# Patient Record
Sex: MALE | Race: WHITE | NOT HISPANIC OR LATINO | Employment: OTHER | URBAN - METROPOLITAN AREA
[De-identification: names, ages, dates, MRNs, and addresses within clinical notes are randomized per-mention and may not be internally consistent; named-entity substitution may affect disease eponyms.]

---

## 2024-02-22 ENCOUNTER — TELEPHONE (OUTPATIENT)
Dept: FAMILY MEDICINE | Facility: CLINIC | Age: 89
End: 2024-02-22
Payer: OTHER GOVERNMENT

## 2024-02-22 NOTE — TELEPHONE ENCOUNTER
----- Message from Marta Ferris sent at 2/22/2024  3:48 PM CST -----  Contact: Hugh ( pt son )  Hugh ( pt son ) is requesting a call from nurse to discuss sending pt records over electrically .  Please call Hugh ( pt son )  back at [phone]

## 2024-03-22 ENCOUNTER — OFFICE VISIT (OUTPATIENT)
Dept: FAMILY MEDICINE | Facility: CLINIC | Age: 89
End: 2024-03-22
Payer: MEDICARE

## 2024-03-22 ENCOUNTER — LAB VISIT (OUTPATIENT)
Dept: LAB | Facility: HOSPITAL | Age: 89
End: 2024-03-22
Attending: FAMILY MEDICINE
Payer: MEDICARE

## 2024-03-22 VITALS — HEART RATE: 60 BPM | HEIGHT: 69 IN | OXYGEN SATURATION: 98 % | WEIGHT: 204 LBS | BODY MASS INDEX: 30.21 KG/M2

## 2024-03-22 DIAGNOSIS — Z79.4 TYPE 2 DIABETES MELLITUS WITH STAGE 3 CHRONIC KIDNEY DISEASE, WITH LONG-TERM CURRENT USE OF INSULIN, UNSPECIFIED WHETHER STAGE 3A OR 3B CKD: Primary | Chronic | ICD-10-CM

## 2024-03-22 DIAGNOSIS — I48.91 ATRIAL FIBRILLATION, UNSPECIFIED TYPE: Chronic | ICD-10-CM

## 2024-03-22 DIAGNOSIS — Z79.899 ENCOUNTER FOR LONG-TERM (CURRENT) USE OF MEDICATIONS: ICD-10-CM

## 2024-03-22 DIAGNOSIS — Z13.220 ENCOUNTER FOR LIPID SCREENING FOR CARDIOVASCULAR DISEASE: ICD-10-CM

## 2024-03-22 DIAGNOSIS — N18.30 TYPE 2 DIABETES MELLITUS WITH STAGE 3 CHRONIC KIDNEY DISEASE, WITH LONG-TERM CURRENT USE OF INSULIN, UNSPECIFIED WHETHER STAGE 3A OR 3B CKD: Primary | Chronic | ICD-10-CM

## 2024-03-22 DIAGNOSIS — Z00.00 ENCOUNTER FOR MEDICAL EXAMINATION TO ESTABLISH CARE: ICD-10-CM

## 2024-03-22 DIAGNOSIS — G62.9 NEUROPATHY: ICD-10-CM

## 2024-03-22 DIAGNOSIS — Z23 NEED FOR VACCINATION: ICD-10-CM

## 2024-03-22 DIAGNOSIS — Z12.5 ENCOUNTER FOR PROSTATE CANCER SCREENING: ICD-10-CM

## 2024-03-22 DIAGNOSIS — J44.9 CHRONIC OBSTRUCTIVE PULMONARY DISEASE, UNSPECIFIED COPD TYPE: Chronic | ICD-10-CM

## 2024-03-22 DIAGNOSIS — G47.34 OXYGEN DESATURATION DURING SLEEP: ICD-10-CM

## 2024-03-22 DIAGNOSIS — N40.1 BENIGN PROSTATIC HYPERPLASIA WITH LOWER URINARY TRACT SYMPTOMS, SYMPTOM DETAILS UNSPECIFIED: ICD-10-CM

## 2024-03-22 DIAGNOSIS — E11.22 TYPE 2 DIABETES MELLITUS WITH STAGE 3 CHRONIC KIDNEY DISEASE, WITH LONG-TERM CURRENT USE OF INSULIN, UNSPECIFIED WHETHER STAGE 3A OR 3B CKD: Primary | Chronic | ICD-10-CM

## 2024-03-22 DIAGNOSIS — Z13.6 ENCOUNTER FOR LIPID SCREENING FOR CARDIOVASCULAR DISEASE: ICD-10-CM

## 2024-03-22 PROBLEM — M54.41 CHRONIC BILATERAL LOW BACK PAIN WITH BILATERAL SCIATICA: Status: ACTIVE | Noted: 2017-05-03

## 2024-03-22 PROBLEM — N28.1 BILATERAL RENAL CYSTS: Status: ACTIVE | Noted: 2020-08-26

## 2024-03-22 PROBLEM — S22.49XA RIB FRACTURES: Status: ACTIVE | Noted: 2022-04-25

## 2024-03-22 PROBLEM — E83.42 HYPOMAGNESEMIA: Status: ACTIVE | Noted: 2017-11-14

## 2024-03-22 PROBLEM — I50.30 HEART FAILURE WITH PRESERVED EJECTION FRACTION: Status: ACTIVE | Noted: 2017-09-13

## 2024-03-22 PROBLEM — F33.41 RECURRENT MAJOR DEPRESSIVE DISORDER, IN PARTIAL REMISSION: Chronic | Status: ACTIVE | Noted: 2017-07-13

## 2024-03-22 PROBLEM — E03.9 HYPOTHYROID: Status: ACTIVE | Noted: 2017-05-03

## 2024-03-22 PROBLEM — N18.32 STAGE 3B CHRONIC KIDNEY DISEASE: Chronic | Status: ACTIVE | Noted: 2020-11-11

## 2024-03-22 PROBLEM — D50.9 IRON DEFICIENCY ANEMIA: Status: ACTIVE | Noted: 2020-08-21

## 2024-03-22 PROBLEM — J40 BRONCHITIS: Status: ACTIVE | Noted: 2017-05-03

## 2024-03-22 PROBLEM — R29.898 MUSCULAR DECONDITIONING: Status: ACTIVE | Noted: 2017-06-03

## 2024-03-22 PROBLEM — G25.81 RESTLESS LEG SYNDROME: Status: ACTIVE | Noted: 2020-08-25

## 2024-03-22 PROBLEM — G47.33 OBSTRUCTIVE SLEEP APNEA SYNDROME: Status: ACTIVE | Noted: 2017-08-10

## 2024-03-22 PROBLEM — R00.1 SINUS BRADYCARDIA: Status: ACTIVE | Noted: 2017-09-28

## 2024-03-22 PROBLEM — M54.2 NECK PAIN: Status: ACTIVE | Noted: 2020-03-01

## 2024-03-22 PROBLEM — I10 ESSENTIAL HYPERTENSION: Status: ACTIVE | Noted: 2017-05-03

## 2024-03-22 PROBLEM — Z79.01 LONG TERM CURRENT USE OF ANTICOAGULANT: Status: ACTIVE | Noted: 2024-03-22

## 2024-03-22 PROBLEM — N25.81 HYPERPARATHYROIDISM, SECONDARY RENAL: Status: ACTIVE | Noted: 2023-07-18

## 2024-03-22 PROBLEM — M15.9 GENERALIZED OSTEOARTHRITIS OF MULTIPLE SITES: Status: ACTIVE | Noted: 2017-05-31

## 2024-03-22 PROBLEM — I70.0 ATHEROSCLEROSIS OF AORTA: Chronic | Status: ACTIVE | Noted: 2022-07-21

## 2024-03-22 PROBLEM — B35.4 TINEA CORPORIS: Status: ACTIVE | Noted: 2018-02-12

## 2024-03-22 PROBLEM — K59.01 SLOW TRANSIT CONSTIPATION: Status: ACTIVE | Noted: 2018-06-01

## 2024-03-22 PROBLEM — M54.42 CHRONIC BILATERAL LOW BACK PAIN WITH BILATERAL SCIATICA: Status: ACTIVE | Noted: 2017-05-03

## 2024-03-22 PROBLEM — I87.8 VENOUS STASIS: Status: ACTIVE | Noted: 2017-07-13

## 2024-03-22 PROBLEM — G89.29 CHRONIC BILATERAL LOW BACK PAIN WITH BILATERAL SCIATICA: Status: ACTIVE | Noted: 2017-05-03

## 2024-03-22 PROBLEM — I87.2 VENOUS STASIS DERMATITIS OF BOTH LOWER EXTREMITIES: Status: ACTIVE | Noted: 2018-06-01

## 2024-03-22 PROBLEM — E11.42 DIABETIC POLYNEUROPATHY ASSOCIATED WITH TYPE 2 DIABETES MELLITUS: Chronic | Status: ACTIVE | Noted: 2017-05-03

## 2024-03-22 PROBLEM — R06.00 DYSPNEA: Status: ACTIVE | Noted: 2020-02-20

## 2024-03-22 PROBLEM — K59.81 OGILVIE SYNDROME: Status: ACTIVE | Noted: 2022-04-25

## 2024-03-22 PROBLEM — R60.0 BILATERAL LOWER EXTREMITY EDEMA: Status: ACTIVE | Noted: 2017-07-13

## 2024-03-22 PROBLEM — F40.240 CLAUSTROPHOBIA: Status: ACTIVE | Noted: 2017-08-11

## 2024-03-22 PROBLEM — R26.89 BALANCE PROBLEM: Status: ACTIVE | Noted: 2017-05-31

## 2024-03-22 PROBLEM — I25.10 ARTERIOSCLEROSIS OF CORONARY ARTERY: Status: ACTIVE | Noted: 2024-03-22

## 2024-03-22 PROBLEM — N18.4 STAGE 4 CHRONIC KIDNEY DISEASE: Status: ACTIVE | Noted: 2024-03-22

## 2024-03-22 PROBLEM — D64.9 ANEMIA: Status: ACTIVE | Noted: 2018-06-01

## 2024-03-22 PROBLEM — H54.7 VISION IMPAIRMENT: Status: ACTIVE | Noted: 2017-09-07

## 2024-03-22 PROBLEM — E78.5 DYSLIPIDEMIA: Status: ACTIVE | Noted: 2017-09-13

## 2024-03-22 PROBLEM — R91.8 LUNG MASS: Status: ACTIVE | Noted: 2024-01-25

## 2024-03-22 PROBLEM — E13.3399: Chronic | Status: ACTIVE | Noted: 2017-05-03

## 2024-03-22 PROBLEM — N18.4 STAGE 4 CHRONIC KIDNEY DISEASE: Status: RESOLVED | Noted: 2024-03-22 | Resolved: 2024-03-22

## 2024-03-22 LAB
ALBUMIN SERPL BCP-MCNC: 2.9 G/DL (ref 3.5–5.2)
ALP SERPL-CCNC: 81 U/L (ref 55–135)
ALT SERPL W/O P-5'-P-CCNC: 21 U/L (ref 10–44)
ANION GAP SERPL CALC-SCNC: 10 MMOL/L (ref 8–16)
AST SERPL-CCNC: 22 U/L (ref 10–40)
BILIRUB SERPL-MCNC: 0.4 MG/DL (ref 0.1–1)
BUN SERPL-MCNC: 35 MG/DL (ref 8–23)
CALCIUM SERPL-MCNC: 8.2 MG/DL (ref 8.7–10.5)
CHLORIDE SERPL-SCNC: 109 MMOL/L (ref 95–110)
CO2 SERPL-SCNC: 23 MMOL/L (ref 23–29)
CREAT SERPL-MCNC: 1.9 MG/DL (ref 0.5–1.4)
EST. GFR  (NO RACE VARIABLE): 33.1 ML/MIN/1.73 M^2
GLUCOSE SERPL-MCNC: 122 MG/DL (ref 70–110)
POTASSIUM SERPL-SCNC: 4 MMOL/L (ref 3.5–5.1)
PROT SERPL-MCNC: 5.4 G/DL (ref 6–8.4)
SODIUM SERPL-SCNC: 142 MMOL/L (ref 136–145)
T4 FREE SERPL-MCNC: 0.92 NG/DL (ref 0.71–1.51)
TSH SERPL DL<=0.005 MIU/L-ACNC: 9.99 UIU/ML (ref 0.4–4)

## 2024-03-22 PROCEDURE — 80053 COMPREHEN METABOLIC PANEL: CPT | Performed by: FAMILY MEDICINE

## 2024-03-22 PROCEDURE — 90694 VACC AIIV4 NO PRSRV 0.5ML IM: CPT | Mod: PBBFAC,PO

## 2024-03-22 PROCEDURE — 36415 COLL VENOUS BLD VENIPUNCTURE: CPT | Mod: PO | Performed by: FAMILY MEDICINE

## 2024-03-22 PROCEDURE — 84443 ASSAY THYROID STIM HORMONE: CPT | Performed by: FAMILY MEDICINE

## 2024-03-22 PROCEDURE — G0008 ADMIN INFLUENZA VIRUS VAC: HCPCS | Mod: PBBFAC,PO

## 2024-03-22 PROCEDURE — 99999 PR PBB SHADOW E&M-EST. PATIENT-LVL V: CPT | Mod: PBBFAC,,, | Performed by: FAMILY MEDICINE

## 2024-03-22 PROCEDURE — 99205 OFFICE O/P NEW HI 60 MIN: CPT | Mod: S$PBB,,, | Performed by: FAMILY MEDICINE

## 2024-03-22 PROCEDURE — 99999PBSHW FLU VACCINE - QUADRIVALENT - ADJUVANTED: Mod: PBBFAC,,,

## 2024-03-22 PROCEDURE — 84439 ASSAY OF FREE THYROXINE: CPT | Performed by: FAMILY MEDICINE

## 2024-03-22 PROCEDURE — 99215 OFFICE O/P EST HI 40 MIN: CPT | Mod: PBBFAC,PO,25 | Performed by: FAMILY MEDICINE

## 2024-03-22 RX ORDER — DOXAZOSIN 4 MG/1
4 TABLET ORAL NIGHTLY
COMMUNITY
End: 2024-03-22

## 2024-03-22 RX ORDER — CITALOPRAM 20 MG/1
20 TABLET, FILM COATED ORAL
COMMUNITY
Start: 2024-01-30

## 2024-03-22 RX ORDER — CARVEDILOL 3.12 MG/1
3.12 TABLET ORAL
COMMUNITY
Start: 2023-10-10

## 2024-03-22 RX ORDER — ALBUTEROL SULFATE 90 UG/1
2 AEROSOL, METERED RESPIRATORY (INHALATION) EVERY 4 HOURS PRN
COMMUNITY

## 2024-03-22 RX ORDER — ATORVASTATIN CALCIUM 20 MG/1
20 TABLET, FILM COATED ORAL
COMMUNITY
Start: 2024-02-26

## 2024-03-22 RX ORDER — BENZONATATE 100 MG/1
100 CAPSULE ORAL 3 TIMES DAILY PRN
COMMUNITY
Start: 2023-12-14 | End: 2024-04-26

## 2024-03-22 RX ORDER — GABAPENTIN 100 MG/1
100 CAPSULE ORAL
COMMUNITY
Start: 2024-01-29 | End: 2024-03-22

## 2024-03-22 RX ORDER — BUMETANIDE 1 MG/1
TABLET ORAL
COMMUNITY
Start: 2024-03-01

## 2024-03-22 RX ORDER — CHOLECALCIFEROL (VITAMIN D3) 25 MCG
25 TABLET ORAL
COMMUNITY
Start: 2023-04-27

## 2024-03-22 RX ORDER — LEVOTHYROXINE SODIUM 200 UG/1
1 TABLET ORAL DAILY
COMMUNITY
Start: 1970-01-01

## 2024-03-22 RX ORDER — TRAMADOL HYDROCHLORIDE 50 MG/1
50 TABLET ORAL EVERY 6 HOURS PRN
COMMUNITY
End: 2024-04-26 | Stop reason: SDUPTHER

## 2024-03-22 RX ORDER — DUTASTERIDE 0.5 MG/1
1 CAPSULE, LIQUID FILLED ORAL DAILY
COMMUNITY
Start: 2024-01-29

## 2024-03-22 RX ORDER — ROPINIROLE 0.25 MG/1
0.25 TABLET, FILM COATED ORAL
COMMUNITY
Start: 2023-04-27

## 2024-03-22 RX ORDER — INSULIN GLARGINE-YFGN 100 [IU]/ML
INJECTION, SOLUTION SUBCUTANEOUS
COMMUNITY
Start: 2023-04-26 | End: 2024-04-26

## 2024-03-22 RX ORDER — DOXAZOSIN 8 MG/1
8 TABLET ORAL DAILY
COMMUNITY

## 2024-03-22 RX ORDER — PREGABALIN 75 MG/1
75 CAPSULE ORAL 2 TIMES DAILY
Qty: 60 CAPSULE | Refills: 12 | Status: SHIPPED | OUTPATIENT
Start: 2024-03-22 | End: 2025-04-16

## 2024-03-22 NOTE — PROGRESS NOTES
PLAN:    Assessment & Plan  1. Diabetes.  His diabetes is well controlled. His A1c is 6.2. I will start him on Jardiance 1 mg in the morning. He was advised to not stop the Lantus until he gets on the Jardiance. If his sugars start dropping below 100, I would back it off or switch it to a different time a day or split it up and do 5 in the morning and 5 at night.  We will plan to monitor hemoglobin A1c at designated intervals 3 to 6 months.  I recommend ongoing Education for diabetic diet and exercise protocol.  We will continue to monitor for side effects.    Please be advised of symptoms to monitor for and to notify me immediately if persistent or worsening.  Follow up with Ophthalmology/Optometry and Podiatry at least annually.      2. Hypothyroidism.  His last TSH that I can see through Care Everywhere in Epic shows elevation of TSH. I will recheck labs on this.  Please be advised of hypothyroidism disease course.  We will plan to monitor thyroid labs at routine intervals.  Please be advised of risks and benefits of medication use, see medication insert for complete details.  ER precautions.    Common complaints from hypothyroidism include weight gain, fatigue, cold intolerance, constipation, dry and flaky shin, coarse or loss of hair, and trouble with memory.     Complaints with hyperthyroidism are weight loss or decrease in appetite, weakness and fatigue, visual changes, fast heart rate, decreased heat tolerance, thinning scalp hair, change in bowel habits, and palpations.    3. Atrial fibrillation.  He will be referred to a cardiologist.    Atrial fibrillation precautions.  Continue blood thinner and beta-blocker.    4. Neuropathy.  He has neuropathy in his feet, legs, and hands. We will discontinue gabapentin.  Start Lyrica.  Discussed risk and benefits of medication use.    5. Hypothyroidism.  He is on levothyroxine 200 mcg daily. He reports compliance. No side effects reported. His last TSH that I can see  through Care Everywhere in Epic shows elevation of TSH. I will recheck labs on this. (  Duplicate)    6. Heart failure.  He is on Bumex 1 to 2 as needed. He was advised to wear compression stockings. He was advised to do daily weights. I will refer him to a   Cardiologist. If there is fluctuation in his weight, we will increase his Bumex.   Heart failure precautions.    7. BPH.  He is on dutasteride 0.5 mg. I will refer him to Dr. Dejan Mcintosh.   Continue doxazosin.    8. Restless leg syndrome.  He is on ropinirole 0.25 mg daily. We will discontinue gabapentin.    9. Health maintenance.  He will receive his influenza vaccine today. He was advised to get RSV and shingles vaccines.    10. Lower extremity edema.  He has lower extremity edema.  Start compression stockings.     11. COPD referral to Pulmonary.  Patient is concerned about CF.  He does have congestion.    Follow-up  He will get his blood work done in 4 weeks.    Problem List Items Addressed This Visit       Atrial fibrillation (Chronic)    Relevant Orders    Ambulatory referral/consult to Cardiology    COPD (chronic obstructive pulmonary disease) (Chronic)    Relevant Orders    Ambulatory referral/consult to Pulmonology    Type 2 diabetes mellitus with stage 3 chronic kidney disease, with long-term current use of insulin - Primary (Chronic)    Relevant Medications    insulin glargine-yfgn 100 unit/mL (3 mL) InPn    semaglutide (OZEMPIC) 1 mg/dose (4 mg/3 mL)    empagliflozin (JARDIANCE) 10 mg tablet    Other Relevant Orders    Ambulatory referral/consult to Nephrology    Benign prostatic hyperplasia with lower urinary tract symptoms    Relevant Orders    Ambulatory referral/consult to Urology    Neuropathy    Relevant Medications    pregabalin (LYRICA) 75 MG capsule    Encounter for long-term (current) use of medications    Relevant Orders    Lipid Panel    Hemoglobin A1C    CBC Without Differential    Comprehensive Metabolic Panel    TSH     Other Visit  Diagnoses       Encounter for medical examination to establish care        Relevant Orders    Lipid Panel    Hemoglobin A1C    CBC Without Differential    Comprehensive Metabolic Panel    TSH    Encounter for lipid screening for cardiovascular disease        Relevant Orders    Lipid Panel    Encounter for prostate cancer screening            The natural history of prostate cancer and ongoing controversy regarding screening and potential treatment outcomes of prostate cancer has been discussed with the patient. The meaning of a false positive PSA and a false negative PSA has been discussed. He indicates understanding of the limitations of this screening test and wishes  not to proceed with screening PSA testing.    Future Appointments       Date Provider Specialty Appt Notes    3/22/2024  Lab     4/18/2024 Dayna Olvera PA-C Pulmonology COPD    5/2/2024 Bret Hill MD Cardiology a fib    6/5/2024 Ramy Troncoso MD Nephrology type 2 diabetes/CKD3           Medication Management for assessment above:   Medication List with Changes/Refills   New Medications    EMPAGLIFLOZIN (JARDIANCE) 10 MG TABLET    Take 1 tablet (10 mg total) by mouth once daily.    PREGABALIN (LYRICA) 75 MG CAPSULE    Take 1 capsule (75 mg total) by mouth 2 (two) times daily.   Current Medications    ALBUTEROL (PROVENTIL/VENTOLIN HFA) 90 MCG/ACTUATION INHALER    Inhale 2 puffs into the lungs every 4 (four) hours as needed.    APIXABAN (ELIQUIS) 2.5 MG TAB    2.5 mg.    ATORVASTATIN (LIPITOR) 20 MG TABLET    20 mg.    BENZONATATE (TESSALON) 100 MG CAPSULE    Take 100 mg by mouth 3 (three) times daily as needed.    BUMETANIDE (BUMEX) 1 MG TABLET    Take two tablet early in the morning and one right after lunch.    CARVEDILOL (COREG) 3.125 MG TABLET    3.125 mg.    CITALOPRAM (CELEXA) 20 MG TABLET    20 mg.    DOXAZOSIN (CARDURA) 8 MG TAB    Take 8 mg by mouth once daily.    DUTASTERIDE (AVODART) 0.5 MG CAPSULE    Take 1 capsule  by mouth once daily.    INSULIN GLARGINE-YFGN 100 UNIT/ML (3 ML) INPN    ADMINISTER 8 UNITS UNDER THE SKIN PER COMMENTS:  FOR DIABETES  AT BEDTIME, AFTER 3 DAYS IF MORNING FASTING BLOOD SUGAR ABOVE 120,  INCREASE YOUR INSULIN BY 3 UNITS EVERY 3 DAYS TO GET  THE MORNING BLOOD  SUGAR UNDER CONTROL FOR DIABETES  AT BEDTIME, AFTER 3 DAYS IF MORNING FASTING BLOOD SUGAR ABOVE 120,  INCREASE YOUR INSULIN BY 3 UNITS EVERY 3 DAYS TO GET  THE MORNING BLOOD  SUGAR UNDER CONTROL    LEVOTHYROXINE (SYNTHROID) 200 MCG TABLET    Take 1 tablet by mouth once daily.    ROPINIROLE (REQUIP) 0.25 MG TABLET    0.25 mg.    SEMAGLUTIDE (OZEMPIC) 1 MG/DOSE (4 MG/3 ML)    ADMINISTER 1 MG UNDER THE SKIN EVERY WEEK FOR DIABETES    TRAMADOL (ULTRAM) 50 MG TABLET    Take 50 mg by mouth every 6 (six) hours as needed for Pain.    VITAMIN D (VITAMIN D3) 1000 UNITS TAB    25 mcg.   Discontinued Medications    DOXAZOSIN (CARDURA) 4 MG TABLET    Take 4 mg by mouth every evening.    GABAPENTIN (NEURONTIN) 100 MG CAPSULE    Take 100 mg by mouth.       Bao Mcarthur M.D.  ==========================================================================  Subjective:   Patient ID: Zach Lund is a 89 y.o. male.  has no past medical history on file.   Chief Complaint: Establish Care, Hyperlipidemia, Hypertension, Diabetes, and Atrial Fibrillation      History of Present Illness  This is a pleasant 89-year-old male coming in to establish care. This patient is coming to establish care. He just had labs done in [place] from [facility] where he was at. I can see his records in there. He has a history of A. fib, diabetes, hypertension, and hyperlipidemia. He was established with all the specialists over there, but he needs to transfer everything over here. [facility] yet. He lives at [facility]. He is in assisted living. This patient is a diabetic. His last A1c was in 01/2025. A1c is 6.2,  which has been stable on Ozempic 1 mg weekly. The patient also has a history of atrial fibrillation. He is on Eliquis 2.5 mg and carvedilol 3.125 mg. The patient has hypothyroidism, on levothyroxine 200 mcg daily. He reports compliance. No side effects reported. Symptoms are mostly controlled. His last TSH that I can see through Care Everywhere in Epic shows elevation of TSH. I will recheck labs on this. His cholesterol is well controlled with LDL of 94, on atorvastatin 20 mg daily. He also has a history of heart failure, on Bumex 1 mg. He has some BPH, on dutasteride 0.5 mg, which is stable. Restless leg, on ropinirole 0.25 mg daily. He used to take gabapentin 100 mg but had side effect. Taking albuterol and Tessalon Perles for cough and COPD. He has Celexa 20 mg daily on his medication list. We will find out what he is taking this for and provide refills. He is accompanied by an adult Male.    He has no problems today, just wants to get established. He is taking Ozempic and Lantus between 10 and 12 units. His primary care in [place] had mentioned about starting him on Jardiance in addition to ozempic because of the heart failure diagnosis. He has been gaining weight. He has had a couple of low episodes of glucose into the 60s occasionally. As soon as he ate, his blood sugar would be up in the 300s. Last year, he was 271 pounds, got down to 185, and now he is up to 204. He is not eating much.  He likes to eat catfish.    [facility]. He was down to 175 on levothyroxine and then went up on it.    He had a UroLift. He has not seen urology in a year. He is taking doxazosin 8 mg 1 a day for enlarged prostate. He does have some urine incontinence. He saw a urologist in [place].    He has neuropathy in his feet, legs, and hands. He used to take gabapentin, but he has not taken it for 6 years because it made him sick and more painful. He has not tried Lyrica. He is getting new symptoms in his hands  "and fingers like somebody sticks him with an ice pick. It started last month.    He has been doing EKGs on his Apple watch. It showed atrial fibrillation in the last week or so. His heart rate has been running 50 to 52. He does not wear compression stockings. He has a lot of phlegm. He takes Spiriva, albuterol, and Tessalon Perles. He is on 3 to 5 liters of oxygen at home because he is short of breath most of the time.    Supplemental Information  He takes tramadol as needed.   He last received his influenza vaccine in 2022.    Diabetes Management Status    Statin: Taking  ACE/ARB: Not taking    Screening or Prevention Patient's value Goal Complete/Controlled?   HgA1C Testing and Control   No results found for: "HGBA1C"   Annually/Less than 8% No   Lipid profile : 01/15/2024 Annually No   LDL control No results found for: "LDLCALC" Annually/Less than 100 mg/dl  No   Nephropathy screening No results found for: "LABMICR"  No results found for: "PROTEINUA"  No results found for: "UTPCR"   Annually No   Blood pressure BP Readings from Last 1 Encounters:   No data found for BP    Less than 140/90 Yes   Dilated retinal exam Most Recent Eye Exam Date: Not Found Annually No   Foot exam   Most Recent Foot Exam Date: Not Found Annually No     Problem List Items Addressed This Visit       Atrial fibrillation (Chronic)    Overview     Formatting of this note might be different from the original.   6/26/2020 EKG: Atrial fibrillation         COPD (chronic obstructive pulmonary disease) (Chronic)    Overview     Formatting of this note might be different from the original.   Former smoker, 10.00 pack-year      Last Assessment & Plan:    Formatting of this note might be different from the original.   COPD is unchanged.   COPD information handout given.      Continue albuterol plan.         Type 2 diabetes mellitus with stage 3 chronic kidney disease, with long-term current use of insulin - Primary (Chronic)    Overview     " Formatting of this note is different from the original.   Lab Results    Component Value Date/Time     HGBA1C 6.2 (H) 01/26/2023 08:55 AM     GFR 39 (L) 12/09/2022 08:40 AM     GFRCNAFA 34 (L) 02/17/2022 01:44 PM     MICROALBCREA 22.4 10/31/2022 12:11 PM     LDLCALC 43 02/17/2022 01:44 PM       Last Eye Exam: 2/2/2023    Last Foot Exam: 5/18/2022       Last Assessment & Plan:    Formatting of this note might be different from the original.   Diabetes is unchanged.    Continue current treatment regimen.   Diabetes will be reassessed in 6 months.      A1c 6.2%. Discussed diet control - Lantus 10 units.         Benign prostatic hyperplasia with lower urinary tract symptoms    Neuropathy    Overview     Last Assessment & Plan:    Formatting of this note might be different from the original.   Continue home dose gabapentin         Encounter for long-term (current) use of medications     Other Visit Diagnoses       Encounter for medical examination to establish care        Encounter for lipid screening for cardiovascular disease        Encounter for prostate cancer screening                 Review of patient's allergies indicates:   Allergen Reactions    Iodinated contrast media Anaphylaxis    Iodine Anaphylaxis     Breathing issue     Current Outpatient Medications   Medication Instructions    albuterol (PROVENTIL/VENTOLIN HFA) 90 mcg/actuation inhaler 2 puffs, Inhalation, Every 4 hours PRN    apixaban (ELIQUIS) 2.5 mg    atorvastatin (LIPITOR) 20 mg    benzonatate (TESSALON) 100 mg, Oral, 3 times daily PRN    bumetanide (BUMEX) 1 MG tablet Take two tablet early in the morning and one right after lunch.    carvediloL (COREG) 3.125 mg    citalopram (CELEXA) 20 mg    doxazosin (CARDURA) 8 mg, Oral, Daily    dutasteride (AVODART) 0.5 mg capsule 1 capsule, Oral, Daily    empagliflozin (JARDIANCE) 10 mg, Oral, Daily    insulin glargine-yfgn 100 unit/mL (3 mL) InPn ADMINISTER 8 UNITS UNDER THE SKIN PER COMMENTS:  FOR DIABETES   "AT BEDTIME, AFTER 3 DAYS IF MORNING FASTING BLOOD SUGAR ABOVE 120,  INCREASE YOUR INSULIN BY 3 UNITS EVERY 3 DAYS TO GET  THE MORNING BLOOD  SUGAR UNDER CONTROL FOR DIABETES  AT BEDTIME, AFTER 3 DAYS IF MORNING FASTING BLOOD SUGAR ABOVE 120,  INCREASE YOUR INSULIN BY 3 UNITS EVERY 3 DAYS TO GET  THE MORNING BLOOD  SUGAR UNDER CONTROL    levothyroxine (SYNTHROID) 200 MCG tablet 1 tablet, Oral, Daily    pregabalin (LYRICA) 75 mg, Oral, 2 times daily    rOPINIRole (REQUIP) 0.25 mg    semaglutide (OZEMPIC) 1 mg/dose (4 mg/3 mL) ADMINISTER 1 MG UNDER THE SKIN EVERY WEEK FOR DIABETES    traMADoL (ULTRAM) 50 mg, Oral, Every 6 hours PRN    vitamin D (VITAMIN D3) 25 mcg      I have reviewed the PMH, social history, FamilyHx, surgical history, allergies and medications documented / confirmed by the patient at the time of this visit.  Review of Systems   Constitutional:  Negative for chills, fatigue, fever and unexpected weight change.   HENT:  Negative for ear pain and sore throat.    Eyes:  Negative for redness and visual disturbance.   Respiratory:  Negative for cough and shortness of breath.    Cardiovascular:  Positive for leg swelling. Negative for chest pain and palpitations.   Gastrointestinal:  Negative for nausea and vomiting.   Endocrine: Negative for cold intolerance and heat intolerance.   Genitourinary:  Negative for difficulty urinating and hematuria.   Musculoskeletal:  Negative for arthralgias and myalgias.   Skin:  Negative for rash and wound.   Allergic/Immunologic: Negative for environmental allergies and food allergies.   Neurological:  Negative for weakness and headaches.   Hematological:  Negative for adenopathy. Does not bruise/bleed easily.   Psychiatric/Behavioral:  Negative for sleep disturbance. The patient is not nervous/anxious.      Objective:   Pulse 60   Ht 5' 9" (1.753 m)   Wt 92.5 kg (204 lb)   SpO2 98%   BMI 30.13 kg/m²   Physical Exam  Vitals and nursing note reviewed.   Constitutional: "       General: He is not in acute distress.     Appearance: He is well-developed. He is not diaphoretic.      Comments:   Present with son, sitting in wheelchair   HENT:      Head: Normocephalic and atraumatic.      Right Ear: External ear normal.      Left Ear: External ear normal.      Nose: Nose normal. No rhinorrhea.   Eyes:      Extraocular Movements: Extraocular movements intact.      Pupils: Pupils are equal, round, and reactive to light.   Cardiovascular:      Rate and Rhythm: Rhythm irregular.      Pulses: Normal pulses.      Heart sounds: Murmur heard.   Pulmonary:      Effort: Pulmonary effort is normal. No respiratory distress.      Breath sounds: Wheezing present.      Comments:  Distant breath sounds bilaterally.  Abdominal:      General: Bowel sounds are normal.      Palpations: Abdomen is soft.   Musculoskeletal:         General: Normal range of motion.      Cervical back: Normal range of motion and neck supple.      Right lower leg: Edema present.      Left lower leg: Edema present.   Skin:     General: Skin is warm and dry.      Capillary Refill: Capillary refill takes less than 2 seconds.      Findings: No rash.   Neurological:      General: No focal deficit present.      Mental Status: He is alert and oriented to person, place, and time. Mental status is at baseline.      Cranial Nerves: No cranial nerve deficit.      Motor: No weakness.      Gait: Gait normal.   Psychiatric:         Attention and Perception: He is attentive.         Mood and Affect: Mood normal. Mood is not anxious or depressed. Affect is not labile, blunt, angry or inappropriate.         Speech: He is communicative. Speech is not rapid and pressured, delayed, slurred or tangential.         Behavior: Behavior normal. Behavior is not agitated, slowed, aggressive, withdrawn, hyperactive or combative.         Thought Content: Thought content normal. Thought content is not paranoid or delusional. Thought content does not include  homicidal or suicidal ideation. Thought content does not include homicidal or suicidal plan.         Cognition and Memory: Memory is not impaired.         Judgment: Judgment normal. Judgment is not impulsive or inappropriate.       Physical Exam      Results  Laboratory Studies  Labs were reviewed with the patient.    Assessment:     1. Type 2 diabetes mellitus with stage 3 chronic kidney disease, with long-term current use of insulin, unspecified whether stage 3a or 3b CKD    2. Encounter for medical examination to establish care    3. Encounter for long-term (current) use of medications    4. Encounter for lipid screening for cardiovascular disease    5. Encounter for prostate cancer screening    6. Atrial fibrillation, unspecified type    7. Benign prostatic hyperplasia with lower urinary tract symptoms, symptom details unspecified    8. Neuropathy    9. Chronic obstructive pulmonary disease, unspecified COPD type      MDM:     High medical complexity.  New patient.  Total time: 61 minutes.  This includes total time spent on the encounter, which includes face to face time and non-face to face time preparing to see the patient (eg, review of previous medical records, tests), Obtaining and/or reviewing separately obtained history, documenting clinical information in the electronic or other health record, independently interpreting results (not separately reported)/communicating results to the patient/family/caregiver, and/or care coordination (not separately reported).    I have Reviewed and summarized old records.  I have performed thorough medication reconciliation today and discussed risk and benefits of medications.  I have reviewed labs and discussed with patient.  All questions were answered.  I am requesting old records and will review them once they are available.Previous PCP- Dr. Matias  Cardiology- Dr. Vargas  Urology- Dr. Daniels  Nephrology- Dr. Chase  Derm- Dr. Wesley  Eye- Dr. Palfaox in [place]     I have signed  for the following orders AND/OR meds.  Orders Placed This Encounter   Procedures    Lipid Panel     Standing Status:   Future     Standing Expiration Date:   5/21/2025    Hemoglobin A1C     Standing Status:   Future     Standing Expiration Date:   5/21/2025    CBC Without Differential     Standing Status:   Future     Standing Expiration Date:   5/21/2025    Comprehensive Metabolic Panel     Standing Status:   Future     Standing Expiration Date:   5/21/2025    TSH     Standing Status:   Future     Standing Expiration Date:   5/21/2025    Ambulatory referral/consult to Cardiology     Standing Status:   Future     Standing Expiration Date:   4/22/2025     Referral Priority:   Routine     Referral Type:   Consultation     Referral Reason:   Specialty Services Required     Requested Specialty:   Cardiology     Number of Visits Requested:   1    Ambulatory referral/consult to Nephrology     Standing Status:   Future     Standing Expiration Date:   4/22/2025     Referral Priority:   Urgent     Referral Type:   Consultation     Referral Reason:   Specialty Services Required     Referred to Provider:   Ramy Troncoso MD     Requested Specialty:   Nephrology     Number of Visits Requested:   1    Ambulatory referral/consult to Urology     Standing Status:   Future     Standing Expiration Date:   4/22/2025     Referral Priority:   Routine     Referral Type:   Consultation     Referral Reason:   Specialty Services Required     Referred to Provider:   Dejan Mcintosh MD     Requested Specialty:   Urology     Number of Visits Requested:   1    Ambulatory referral/consult to Pulmonology     Standing Status:   Future     Standing Expiration Date:   4/22/2025     Referral Priority:   Routine     Referral Type:   Consultation     Referral Reason:   Specialty Services Required     Requested Specialty:   Pulmonary Disease     Number of Visits Requested:   1     Medications Ordered This Encounter   Medications    empagliflozin  (JARDIANCE) 10 mg tablet     Sig: Take 1 tablet (10 mg total) by mouth once daily.     Dispense:  90 tablet     Refill:  1    pregabalin (LYRICA) 75 MG capsule     Sig: Take 1 capsule (75 mg total) by mouth 2 (two) times daily.     Dispense:  60 capsule     Refill:  12        Follow up in about 6 months (around 9/22/2024), or if symptoms worsen or fail to improve, for Med refills, LAB RESULTS.  Future Appointments       Date Provider Specialty Appt Notes    3/22/2024  Lab     4/18/2024 Dayna Olvera PA-C Pulmonology COPD    5/2/2024 Bret Hill MD Cardiology a fib    6/5/2024 Ramy Troncoso MD Nephrology type 2 diabetes/CKD3          If no improvement in symptoms or symptoms worsen, advised to call/follow-up at clinic or go to ER. Patient voiced understanding and all questions/concerns were addressed.   DISCLAIMER: This note was compiled by using a speech recognition dictation system and therefore please be aware that typographical / speech recognition errors can and do occur.  Please contact me if you see any errors specifically.  Consent was obtained for [employer] recording system prior to the visit.    Bao Mcarthur M.D.       Office: [phone] [id number] [place] [address]  FAX: [phone]

## 2024-03-22 NOTE — PATIENT INSTRUCTIONS
Follow up in about 6 months (around 9/22/2024), or if symptoms worsen or fail to improve, for Med refills, LAB RESULTS.     Dear patient,   As a result of recent federal legislation (The Federal Cures Act), you may receive lab or pathology results from your visit in your [facility] account before your physician is able to contact you. Your physician or their representative will relay the results to you with their recommendations at their soonest availability.     If no improvement in symptoms or symptoms worsen, please be advised to call MD, follow-up at clinic and/or go to ER if becomes severe.    Bao Mcarthur M.D.        We Offer TELEHEALTH & Same Day Appointments!   Book your Telehealth appointment with me through my nurse or   Clinic appointments on [facility]!    [address] [place] [address]    Office: [phone]   FAX: [phone]    Check out my Facebook Page and Follow Me at: [email or web address]    Check out my website at [employer] by clicking on: [email or web address]    To Schedule appointments online, go to [facility]: [email or web address]

## 2024-03-23 NOTE — PROGRESS NOTES
Make follow-up lab appointment per recommendation below.  Check to see if patient has seen the results through my chart.  If not then,  #CALL THE PATIENT# to discuss results/see if they have questions and document verification of contact. Make F/U appt if needed. [phone]    #My interpretation that was sent to them through [employer]:  Franc, I have reviewed your recent blood work.     Your metabolic panel which shows your glucose, kidney function, electrolytes, and liver function is stable.  Follow-up with nephrology is recommended.  Thyroid study is improved from previous.  FT4 is within normal range.  Continue current dosage of levothyroxine.    =========================  Also please address any outstanding health maintenance that may be due: Shingles Vaccine(1 of 2) Never done  RSV Vaccine (Age 60+ and Pregnant patients)(1 - 1-dose 60+ series) Never done

## 2024-03-24 ENCOUNTER — PATIENT MESSAGE (OUTPATIENT)
Dept: FAMILY MEDICINE | Facility: CLINIC | Age: 89
End: 2024-03-24
Payer: OTHER GOVERNMENT

## 2024-03-25 NOTE — TELEPHONE ENCOUNTER
Were able to see all of this, correct? I see xr's, ct's, etc on my end just want to be sure I'm not missing anything before I reply.

## 2024-03-26 NOTE — TELEPHONE ENCOUNTER
"So through [facility] care everywhere we can see all of the "reports" that were done through another facility with [facility] however we can not see their radiology images at this time.  This may have been where some of the confusion came from if he had recent imaging with no comparison on our end.  "

## 2024-04-05 ENCOUNTER — ON-DEMAND VIRTUAL (OUTPATIENT)
Dept: URGENT CARE | Facility: CLINIC | Age: 89
End: 2024-04-05
Payer: MEDICARE

## 2024-04-05 DIAGNOSIS — J30.9 ALLERGIC RHINITIS, UNSPECIFIED SEASONALITY, UNSPECIFIED TRIGGER: Primary | ICD-10-CM

## 2024-04-05 PROCEDURE — 99203 OFFICE O/P NEW LOW 30 MIN: CPT | Mod: 95,,, | Performed by: NURSE PRACTITIONER

## 2024-04-05 RX ORDER — PREDNISONE 10 MG/1
TABLET ORAL
Qty: 6 TABLET | Refills: 0 | Status: SHIPPED | OUTPATIENT
Start: 2024-04-05 | End: 2024-04-26

## 2024-04-05 NOTE — PROGRESS NOTES
Subjective:      Patient ID: Zach Lund is a 89 y.o. male.    Vitals:  vitals were not taken for this visit.     Chief Complaint: Sinus Problem      Visit Type: TELE AUDIOVISUAL    Present with the patient at the time of consultation: TELEMED PRESENT WITH PATIENT: None (son)    Location: [place], [place] at home    History reviewed. No pertinent past medical history.  Past Surgical History:   Procedure Laterality Date    CHOLECYSTECTOMY  2012    EYE SURGERY  2011    cataracts    PROSTATE SURGERY  2017    Urolift    TONSILLECTOMY       Review of patient's allergies indicates:   Allergen Reactions    Iodinated contrast media Anaphylaxis    Iodine Anaphylaxis     Breathing issue     Current Outpatient Medications on File Prior to Visit   Medication Sig Dispense Refill    albuterol (PROVENTIL/VENTOLIN HFA) 90 mcg/actuation inhaler Inhale 2 puffs into the lungs every 4 (four) hours as needed.      apixaban (ELIQUIS) 2.5 mg Tab 2.5 mg.      atorvastatin (LIPITOR) 20 MG tablet 20 mg.      benzonatate (TESSALON) 100 MG capsule Take 100 mg by mouth 3 (three) times daily as needed.      bumetanide (BUMEX) 1 MG tablet Take two tablet early in the morning and one right after lunch.      carvediloL (COREG) 3.125 MG tablet 3.125 mg.      citalopram (CELEXA) 20 MG tablet 20 mg.      doxazosin (CARDURA) 8 MG Tab Take 8 mg by mouth once daily.      dutasteride (AVODART) 0.5 mg capsule Take 1 capsule by mouth once daily.      empagliflozin (JARDIANCE) 10 mg tablet Take 1 tablet (10 mg total) by mouth once daily. 90 tablet 1    insulin glargine-yfgn 100 unit/mL (3 mL) InPn ADMINISTER 8 UNITS UNDER THE SKIN PER COMMENTS:  FOR DIABETES  AT BEDTIME, AFTER 3 DAYS IF MORNING FASTING BLOOD SUGAR ABOVE 120,  INCREASE YOUR INSULIN BY 3 UNITS EVERY 3 DAYS TO GET  THE MORNING BLOOD  SUGAR UNDER CONTROL FOR DIABETES  AT BEDTIME, AFTER 3 DAYS IF MORNING FASTING BLOOD SUGAR ABOVE 120,  INCREASE YOUR INSULIN BY 3 UNITS EVERY 3 DAYS TO GET  THE  MORNING BLOOD  SUGAR UNDER CONTROL      levothyroxine (SYNTHROID) 200 MCG tablet Take 1 tablet by mouth once daily.      pregabalin (LYRICA) 75 MG capsule Take 1 capsule (75 mg total) by mouth 2 (two) times daily. 60 capsule 12    rOPINIRole (REQUIP) 0.25 MG tablet 0.25 mg.      semaglutide (OZEMPIC) 1 mg/dose (4 mg/3 mL) ADMINISTER 1 MG UNDER THE SKIN EVERY WEEK FOR DIABETES      traMADoL (ULTRAM) 50 mg tablet Take 50 mg by mouth every 6 (six) hours as needed for Pain.      vitamin D (VITAMIN D3) 1000 units Tab 25 mcg.       No current facility-administered medications on file prior to visit.     Family History   Problem Relation Age of Onset    Arthritis Mother     Mental illness Father     Cancer Brother        Medications Ordered                [id number] - [place] - [address] [facility] [address]   [address] [place] [address]    Telephone: [phone]   Fax: [phone]   Hours: Not open 24 hours                         E-Prescribed (1 of 1)              predniSONE (DELTASONE) 10 MG tablet    Sig: Take 2 pills once daily for 2 days and then 1 pill daily for 2 days.       Start: 4/5/24     Quantity: 6 tablet Refills: 0                           Ohs Peq Odvv Intake    4/5/2024 10:47 AM CDT - Filed by Patient   What is your current physical address in the event of a medical emergency?    Are you able to take your vital signs? Yes   Systolic Blood Pressure: 165   Diastolic Blood Pressure: 80   Weight:    Height: 70   Pulse: 52   Temperature:    Respiration rate:    Pulse Oxygen: 96   Please attach any relevant images or files          For 3 weeks, has had sore throat, congestion and voice changes. Has been taking nasal decongestants, allergy pills, flonase. Has lots of mucus and phlegm. Son, Hugh, came on to explain progression of symptoms. Denies fever or sinus pain or pressure. Pt states he has postnasal drip and when he tries to get it up, nothing  comes up. Blood sugars are running well.     Sinus Problem  The current episode started 1 to 4 weeks ago. There has been no fever. Associated symptoms include congestion, a hoarse voice and sneezing. Pertinent negatives include no coughing, headaches, sinus pressure or sore throat. Past treatments include nasal decongestants, oral decongestants and saline nose sprays. The treatment provided mild relief.       Constitution: Negative for fever.   HENT:  Positive for congestion, postnasal drip and voice change. Negative for sinus pain, sinus pressure and sore throat.    Respiratory:  Negative for cough.    Allergic/Immunologic: Positive for sneezing.   Neurological:  Negative for headaches.        Objective:   The physical exam was conducted virtually.  Physical Exam   Constitutional: He is oriented to person, place, and time. No distress.   HENT:   Head: Normocephalic.   Nose: Mucosal edema and congestion present. No rhinorrhea or purulent discharge. Right sinus exhibits no maxillary sinus tenderness and no frontal sinus tenderness. Left sinus exhibits no maxillary sinus tenderness and no frontal sinus tenderness.   Mouth/Throat: No posterior oropharyngeal erythema.   Eyes: Conjunctivae are normal. No scleral icterus. Extraocular movement intact   Pulmonary/Chest: Effort normal. No respiratory distress.   Musculoskeletal: Normal range of motion.         General: Normal range of motion.   Neurological: He is alert and oriented to person, place, and time.   Psychiatric: His behavior is normal. Judgment and thought content normal.       Assessment:     1. Allergic rhinitis, unspecified seasonality, unspecified trigger        Plan:       Allergic rhinitis, unspecified seasonality, unspecified trigger    Other orders  -     predniSONE (DELTASONE) 10 MG tablet; Take 2 pills once daily for 2 days and then 1 pill daily for 2 days.  Dispense: 6 tablet; Refill: 0      Rest. Increase fluid intake.    Monitor your blood sugars,  "as steroids can elevate them temporarily.    Runny nose: Ensure you are blowing your nose frequently. It is better to get the nasal discharge out.    Cough: Cover the cough/cough into the elbow and wash hands often to prevent the spread of germs.  A cough may be the last symptom to go away and may persist for up to 4-6 weeks, so do not be alarmed.    Non-Pharmacological Interventions: Consider humidifiers, maintaining hydration, and elevating the head during sleep to alleviate symptoms.    Congestion: OTC nasal saline into each nostril 1-3 times daily. May also use flonase nasal spray. I especially like Xlear nasal spray to help with congestion.    OTC Mucinex twice daily for at least 5 days to help loosen up mucus.    There are a variety of oral OTC decongestants. Beware of decongestants containing phenylephrine. Studies have shown that these do not work to improve your symptoms.    Nasal decongestants (like Afrin) should only be taken for 1-3 days. If you use these longer than this, you are at high right for "rebound congestion" which means you will continue to be congested.    If headache or fever, take OTC tylenol or ibuprofen as needed per the instructions on the label.    Sore throat: OTC Cepacol or Chloraseptic throat lozenges. Warm salt water gargles several times daily.     Sneezing: Antihistamines (Claritin, Allegra, Benadryl, Zyrtec, Xyzal) help with these symptoms. Benadryl, Zyrtec, and Xyzal may cause drowsiness, so avoid operating any heavy machinery or driving while on these medications.    Report for immediate medical care for symptoms including but not limited to: unremitting fever, severe nasal congestion that makes it hard for you to breathe, nostrils are flaring, skin is pale, wheezing, shortness of breath, chest pain, etc.    Follow-up with your PCP for recheck in 1-3 days.    All questions were answered to the best of my abilities and all concerns were addressed at this time.     Follow up: "   1. Please schedule a virtual follow up visit as needed.  2. Please see an in-person provider if your symptoms are worsening or not improving in 2-3 days.  3. Please print a copy of this note and send it to your regular doctor or take it to your next visit so it may be included in your medical record.   4. You must understand that you've received Telehealth Urgent Care treatment only and that you may be released before all your medical problems are known or treated. You, the patient, will arrange for follow up care as instructed.  5. Follow up with your PCP or specialty clinic as directed. To schedule an appointment with the appropriate provider with [facility], please call [phone]. For pediatric referrals, please call [phone]  6. Contact customer support at [phone] for questions or concerns  7. For prescription questions or problems, call [phone] anytime for assistance.  8. For [facility] support, call [phone].

## 2024-04-18 ENCOUNTER — LAB VISIT (OUTPATIENT)
Dept: LAB | Facility: HOSPITAL | Age: 89
End: 2024-04-18
Attending: PHYSICIAN ASSISTANT
Payer: MEDICARE

## 2024-04-18 ENCOUNTER — OFFICE VISIT (OUTPATIENT)
Dept: PULMONOLOGY | Facility: CLINIC | Age: 89
End: 2024-04-18
Payer: MEDICARE

## 2024-04-18 ENCOUNTER — TELEPHONE (OUTPATIENT)
Dept: PULMONOLOGY | Facility: CLINIC | Age: 89
End: 2024-04-18

## 2024-04-18 VITALS
BODY MASS INDEX: 29.49 KG/M2 | WEIGHT: 206 LBS | HEART RATE: 57 BPM | HEIGHT: 70 IN | DIASTOLIC BLOOD PRESSURE: 72 MMHG | OXYGEN SATURATION: 97 % | RESPIRATION RATE: 18 BRPM | SYSTOLIC BLOOD PRESSURE: 118 MMHG

## 2024-04-18 DIAGNOSIS — J44.9 CHRONIC OBSTRUCTIVE PULMONARY DISEASE, UNSPECIFIED COPD TYPE: Primary | Chronic | ICD-10-CM

## 2024-04-18 DIAGNOSIS — J44.9 CHRONIC OBSTRUCTIVE PULMONARY DISEASE, UNSPECIFIED COPD TYPE: Chronic | ICD-10-CM

## 2024-04-18 DIAGNOSIS — G47.34 OXYGEN DESATURATION DURING SLEEP: ICD-10-CM

## 2024-04-18 DIAGNOSIS — R91.8 LUNG MASS: ICD-10-CM

## 2024-04-18 LAB
BASOPHILS # BLD AUTO: 0.03 K/UL (ref 0–0.2)
BASOPHILS NFR BLD: 0.4 % (ref 0–1.9)
DIFFERENTIAL METHOD BLD: ABNORMAL
EOSINOPHIL # BLD AUTO: 0.3 K/UL (ref 0–0.5)
EOSINOPHIL NFR BLD: 3.3 % (ref 0–8)
ERYTHROCYTE [DISTWIDTH] IN BLOOD BY AUTOMATED COUNT: 13.9 % (ref 11.5–14.5)
HCT VFR BLD AUTO: 33.4 % (ref 40–54)
HGB BLD-MCNC: 10.1 G/DL (ref 14–18)
IMM GRANULOCYTES # BLD AUTO: 0.05 K/UL (ref 0–0.04)
IMM GRANULOCYTES NFR BLD AUTO: 0.6 % (ref 0–0.5)
LYMPHOCYTES # BLD AUTO: 1.5 K/UL (ref 1–4.8)
LYMPHOCYTES NFR BLD: 19.5 % (ref 18–48)
MCH RBC QN AUTO: 29.8 PG (ref 27–31)
MCHC RBC AUTO-ENTMCNC: 30.2 G/DL (ref 32–36)
MCV RBC AUTO: 99 FL (ref 82–98)
MONOCYTES # BLD AUTO: 0.9 K/UL (ref 0.3–1)
MONOCYTES NFR BLD: 11.1 % (ref 4–15)
NEUTROPHILS # BLD AUTO: 5.1 K/UL (ref 1.8–7.7)
NEUTROPHILS NFR BLD: 65.1 % (ref 38–73)
NRBC BLD-RTO: 0 /100 WBC
PLATELET # BLD AUTO: 178 K/UL (ref 150–450)
PMV BLD AUTO: 11.4 FL (ref 9.2–12.9)
RBC # BLD AUTO: 3.39 M/UL (ref 4.6–6.2)
WBC # BLD AUTO: 7.86 K/UL (ref 3.9–12.7)

## 2024-04-18 PROCEDURE — 99204 OFFICE O/P NEW MOD 45 MIN: CPT | Mod: S$PBB,,, | Performed by: PHYSICIAN ASSISTANT

## 2024-04-18 PROCEDURE — 99215 OFFICE O/P EST HI 40 MIN: CPT | Mod: PBBFAC | Performed by: PHYSICIAN ASSISTANT

## 2024-04-18 PROCEDURE — 82785 ASSAY OF IGE: CPT | Performed by: PHYSICIAN ASSISTANT

## 2024-04-18 PROCEDURE — 36415 COLL VENOUS BLD VENIPUNCTURE: CPT | Performed by: PHYSICIAN ASSISTANT

## 2024-04-18 PROCEDURE — 99999 PR PBB SHADOW E&M-EST. PATIENT-LVL V: CPT | Mod: PBBFAC,,, | Performed by: PHYSICIAN ASSISTANT

## 2024-04-18 PROCEDURE — 85025 COMPLETE CBC W/AUTO DIFF WBC: CPT | Performed by: PHYSICIAN ASSISTANT

## 2024-04-18 RX ORDER — CEPHALEXIN 500 MG/1
500 CAPSULE ORAL
COMMUNITY
Start: 2024-04-09 | End: 2024-04-26

## 2024-04-18 RX ORDER — ALBUTEROL SULFATE 1.25 MG/3ML
1.25 SOLUTION RESPIRATORY (INHALATION) EVERY 6 HOURS PRN
Qty: 75 ML | Refills: 11 | Status: SHIPPED | OUTPATIENT
Start: 2024-04-18 | End: 2024-04-18

## 2024-04-18 RX ORDER — FLUOCINONIDE 0.5 MG/G
CREAM TOPICAL
COMMUNITY
Start: 2024-04-09

## 2024-04-18 RX ORDER — ALBUTEROL SULFATE 1.25 MG/3ML
1.25 SOLUTION RESPIRATORY (INHALATION) EVERY 6 HOURS PRN
Qty: 75 ML | Refills: 11 | Status: SHIPPED | OUTPATIENT
Start: 2024-04-18 | End: 2025-04-18

## 2024-04-18 RX ORDER — TRIAMCINOLONE ACETONIDE 1 MG/G
CREAM TOPICAL
COMMUNITY
Start: 2024-04-09

## 2024-04-18 RX ORDER — ALBUTEROL SULFATE 1.25 MG/3ML
1.25 SOLUTION RESPIRATORY (INHALATION) EVERY 6 HOURS PRN
Qty: 75 ML | Refills: 0 | Status: SHIPPED | OUTPATIENT
Start: 2024-04-18 | End: 2024-04-18

## 2024-04-18 RX ORDER — TIOTROPIUM BROMIDE 18 UG/1
CAPSULE ORAL; RESPIRATORY (INHALATION)
COMMUNITY
Start: 2024-04-09

## 2024-04-18 NOTE — ASSESSMENT & PLAN NOTE
Last Assessment & Plan:    Formatting of this note might be different from the original.   Here is an incidental finding of masslike RUL consolidation on CT chest. The family and patient are aware of this from prior imaging and do not want to proceed with any further testing or treatment for this.    Chest X Ray imaging reviewed: agree with radiology report; Nonspecific right midlung opacity, indeterminate for pneumonia versus a large zone of atelectasis.     Will get new Chest X Ray next visit

## 2024-04-18 NOTE — TELEPHONE ENCOUNTER
Staff returned patients call back. Patients son informed staff the medication was not sent to pharmacy. Staff informed provider of this. Provider resent medication to pharmacy----- Message from Mendel Soto sent at 4/18/2024  2:54 PM CDT -----  Contact: [phone]  Patient called in requesting a call back about nebulizer albuterol (ACCUNEB) 1.25 mg/3 mL , he said he is at the pharmacy and the medicine was never called in , please call back  [phone]

## 2024-04-18 NOTE — ADDENDUM NOTE
Addended by: MARIA E MARINELLI on: 4/18/2024 03:30 PM     Modules accepted: Orders     [Negative] : Heme/Lymph

## 2024-04-18 NOTE — PROGRESS NOTES
Subjective:       Patient ID: Zach Lund is a 89 y.o. male.    Chief Complaint: COPD      4/18/2024  New patient here for COPD  Here with his son  Lives in assisted living  Takes spiriva daily, albuterol prn  3-5L oxygen prn; patient reports history of claustrophobia;  Has a lot of phlem; takes mucinex  Constant feeling of phlem in throat; this makes him feel panicked and he will put oxygen on and feel better      Tobacco   Smoking status Former (Quit: 01/01/2000)   Used Cigarettes   Packs/day 0   Average packs/day 0 packs/day for 45 years   Pack years 2.2   Counseling given? Not Answered   Smokeless status Unknown   Comment never a regular smoker. would only have one in social situations        Moved to [place] from [place] about 2 months ago  Had multiple CXRs in [place]; most recent with possible opacity vs atelectasis? Chest X Ray prior to this with signs of CHF exacerbation which look improved on most recent Chest X Ray  Doing ok today; main concern is constant phlem and cough; white sputum  No chest pain or fever    Sleeps with oxygen  Will not wear CPAP, no recent sleep studies    COPD Questionnaire  How often do you cough?: Some of the time  How often do you have phlegm (mucus) in your chest?: All of the time  How often does your chest feel tight?: Almost never  When you walk up a hill or one flight of stairs, how often are you breathless?: Never  How often are you limited doing any activities at home?: All of the time  How often are you confident leaving the house despite your lung condition?: All of the time  How often do you sleep soundly?: Some of the time  How often do you have energy?: Some of the time  Total score: 18        Immunization History   Administered Date(s) Administered    COVID-19, MRNA, LN-S, PF (Pfizer) (Purple Cap) 02/01/2021, 02/22/2021, 04/01/2021, 04/22/2021    Influenza 10/16/2017, 10/01/2020    Influenza (FLUAD) - Quadrivalent - Adjuvanted - PF *Preferred* (65+)  10/05/2022, 03/22/2024    Influenza - High Dose - PF (65 years and older) 09/15/2020    Influenza - Quadrivalent - High Dose - PF (65 years and older) 10/07/2021    Pneumococcal Conjugate - 13 Valent 11/16/2017    Pneumococcal Polysaccharide - 23 Valent 11/16/2015    Tdap 11/16/2017      Tobacco Use: Medium Risk (4/18/2024)    Patient History     Smoking Tobacco Use: Former     Smokeless Tobacco Use: Unknown     Passive Exposure: Not on file      No past medical history on file.   Current Outpatient Medications on File Prior to Visit   Medication Sig Dispense Refill    albuterol (PROVENTIL/VENTOLIN HFA) 90 mcg/actuation inhaler Inhale 2 puffs into the lungs every 4 (four) hours as needed.      apixaban (ELIQUIS) 2.5 mg Tab 2.5 mg.      atorvastatin (LIPITOR) 20 MG tablet 20 mg.      benzonatate (TESSALON) 100 MG capsule Take 100 mg by mouth 3 (three) times daily as needed.      bumetanide (BUMEX) 1 MG tablet Take two tablet early in the morning and one right after lunch.      carvediloL (COREG) 3.125 MG tablet 3.125 mg.      cephALEXin (KEFLEX) 500 MG capsule 500 mg.      citalopram (CELEXA) 20 MG tablet 20 mg.      doxazosin (CARDURA) 8 MG Tab Take 8 mg by mouth once daily.      dutasteride (AVODART) 0.5 mg capsule Take 1 capsule by mouth once daily.      empagliflozin (JARDIANCE) 10 mg tablet Take 1 tablet (10 mg total) by mouth once daily. 90 tablet 1    fluocinonide 0.05% (LIDEX) 0.05 % cream APPLY SMALL AMOUNT TOPICALLY TWICE A DAY AS NEEDED ITCHING      insulin glargine-yfgn 100 unit/mL (3 mL) InPn ADMINISTER 8 UNITS UNDER THE SKIN PER COMMENTS:  FOR DIABETES  AT BEDTIME, AFTER 3 DAYS IF MORNING FASTING BLOOD SUGAR ABOVE 120,  INCREASE YOUR INSULIN BY 3 UNITS EVERY 3 DAYS TO GET  THE MORNING BLOOD  SUGAR UNDER CONTROL FOR DIABETES  AT BEDTIME, AFTER 3 DAYS IF MORNING FASTING BLOOD SUGAR ABOVE 120,  INCREASE YOUR INSULIN BY 3 UNITS EVERY 3 DAYS TO GET  THE MORNING BLOOD  SUGAR UNDER CONTROL      levothyroxine  "(SYNTHROID) 200 MCG tablet Take 1 tablet by mouth once daily.      predniSONE (DELTASONE) 10 MG tablet Take 2 pills once daily for 2 days and then 1 pill daily for 2 days. 6 tablet 0    pregabalin (LYRICA) 75 MG capsule Take 1 capsule (75 mg total) by mouth 2 (two) times daily. 60 capsule 12    rOPINIRole (REQUIP) 0.25 MG tablet 0.25 mg.      semaglutide (OZEMPIC) 1 mg/dose (4 mg/3 mL) ADMINISTER 1 MG UNDER THE SKIN EVERY WEEK FOR DIABETES      tiotropium (SPIRIVA) 18 mcg inhalation capsule INHALE 1 CAP BY MOUTH EVERY DAY      traMADoL (ULTRAM) 50 mg tablet Take 50 mg by mouth every 6 (six) hours as needed for Pain.      triamcinolone acetonide 0.1% (KENALOG) 0.1 % cream APPLY SMALL AMOUNT TOPICALLY TWICE A DAY AS NEEDED FOR ITCHING      vitamin D (VITAMIN D3) 1000 units Tab 25 mcg.       No current facility-administered medications on file prior to visit.        Review of Systems   Constitutional:  Negative for fever.   HENT:  Negative for sinus pressure and congestion.    Respiratory:  Positive for cough, sputum production, wheezing and dyspnea on extertion. Negative for hemoptysis and shortness of breath.    Cardiovascular:  Negative for chest pain and leg swelling.   Gastrointestinal:  Negative for nausea and vomiting.   Psychiatric/Behavioral:  Negative for sleep disturbance.    All other systems reviewed and are negative.      Objective:       Vitals:    04/18/24 0959   BP: 118/72   Pulse: (!) 57   Resp: 18   SpO2: 97%   Weight: 93.4 kg (206 lb)   Height: 5' 10" (1.778 m)       Physical Exam   Constitutional: He is oriented to person, place, and time. He appears well-developed and well-nourished. No distress.   HENT:   Head: Normocephalic.   Mouth/Throat: Oropharynx is clear and moist.   Cardiovascular: Normal rate and regular rhythm.   Pulmonary/Chest: Effort normal. No respiratory distress. He has no wheezes. He has no rhonchi. He has no rales.   Musculoskeletal:         General: No edema.      Cervical " back: Normal range of motion and neck supple.      Comments: Arthritic hands   Neurological: He is alert and oriented to person, place, and time.   Skin: Skin is warm and dry.   Psychiatric: He has a normal mood and affect.   Vitals reviewed.    Personal Diagnostic Review    No image results found.            Assessment/Plan:       Problem List Items Addressed This Visit          Pulmonary    COPD (chronic obstructive pulmonary disease) - Primary (Chronic)     Continue Spiriva  Possible allergy/asthma component; complains of more wheezing and chest tightness since moving to [place]    Will get IgE, alpha 1, CBC for eosinophil count today  FeNo and [place] next visit    Home neb given today; use prn for cough, wheeze         Relevant Medications    albuterol (ACCUNEB) 1.25 mg/3 mL Nebu    Other Relevant Orders    IGE    CBC auto differential    Spirometry with/without bronchodilator    Fraction of  Nitric Oxide    Alpha-1-Antitrypsin    ALPHA 1 ANTITRYPSIN PHENOTYPE    NEBULIZER KIT (SUPPLIES) FOR HOME USE    NEBULIZER FOR HOME USE    X-Ray Chest PA And Lateral    Lung mass     Last Assessment & Plan:    Formatting of this note might be different from the original.   Here is an incidental finding of masslike RUL consolidation on CT chest. The family and patient are aware of this from prior imaging and do not want to proceed with any further testing or treatment for this.    Chest X Ray imaging reviewed: agree with radiology report; Nonspecific right midlung opacity, indeterminate for pneumonia versus a large zone of atelectasis.     Will get new Chest X Ray next visit              Other    Oxygen desaturation during sleep     Sleeps with oxygen  Does not want CPAP, claustrophobia             Follow up in about 2 months (around 2024) for labs today; estrada, feno, cxr next.    Discussed diagnosis, its evaluation, treatment and usual course. All questions answered.    Patient verbalized understanding of plan  and left in no acute distress    Thank you for the courtesy of participating in the care of this patient    Elizabeth G Blough, PA-C [facility]

## 2024-04-18 NOTE — ASSESSMENT & PLAN NOTE
Continue Spiriva  Possible allergy/asthma component; complains of more wheezing and chest tightness since moving to [place]    Will get IgE, alpha 1, CBC for eosinophil count today  FeNo and [place] next visit    Home neb given today; use prn for cough, wheeze

## 2024-04-19 ENCOUNTER — PATIENT MESSAGE (OUTPATIENT)
Dept: FAMILY MEDICINE | Facility: CLINIC | Age: 89
End: 2024-04-19
Payer: OTHER GOVERNMENT

## 2024-04-19 ENCOUNTER — TELEPHONE (OUTPATIENT)
Dept: DIABETES | Facility: CLINIC | Age: 89
End: 2024-04-19
Payer: OTHER GOVERNMENT

## 2024-04-19 DIAGNOSIS — Z79.4 TYPE 2 DIABETES MELLITUS WITH STAGE 3 CHRONIC KIDNEY DISEASE, WITH LONG-TERM CURRENT USE OF INSULIN, UNSPECIFIED WHETHER STAGE 3A OR 3B CKD: Primary | ICD-10-CM

## 2024-04-19 DIAGNOSIS — E11.22 TYPE 2 DIABETES MELLITUS WITH STAGE 3 CHRONIC KIDNEY DISEASE, WITH LONG-TERM CURRENT USE OF INSULIN, UNSPECIFIED WHETHER STAGE 3A OR 3B CKD: Primary | ICD-10-CM

## 2024-04-19 DIAGNOSIS — N18.30 TYPE 2 DIABETES MELLITUS WITH STAGE 3 CHRONIC KIDNEY DISEASE, WITH LONG-TERM CURRENT USE OF INSULIN, UNSPECIFIED WHETHER STAGE 3A OR 3B CKD: Primary | ICD-10-CM

## 2024-04-19 LAB
A1AT SERPL-MCNC: 176 MG/DL (ref 100–190)
IGE SERPL-ACNC: <35 IU/ML (ref 0–100)

## 2024-04-19 NOTE — TELEPHONE ENCOUNTER
I received your message which was reviewed along with the the medication list and allergies that we have below.  Please review it for accuracy to make sure that we have the most recent records on your history.     Based on this, the following orders were placed AND/OR medicines were sent in.     Orders Placed This Encounter   Procedures    Ambulatory referral/consult to Diabetic Advanced Practice Providers (Medical Management)     Standing Status:   Future     Standing Expiration Date:   5/19/2025     Referral Priority:   Routine     Referral Type:   Consultation     Referral Reason:   Specialty Services Required     Referred to Provider:   Genevieve Whitaker FNP     Number of Visits Requested:   1       Medications written and sent at this time include:       Your pharmacy(ies) of choice at this time on record include the list below and any medications would have been sent to the one at the top.    [id number] - [place] - [address] AT [facility] [address] [facility]  [address]  [facility] [address]  Phone: [phone] Fax: [phone]      Thank you for choosing us as your healthcare provider!  Dr. Bao Mcarthur    ALLERGY LIST  Review of patient's allergies indicates:   Allergen Reactions    Iodinated contrast media Anaphylaxis    Iodine Anaphylaxis     Breathing issue    Lobster Anaphylaxis       MEDICATION LIST  Current Outpatient Medications on File Prior to Visit   Medication Sig Dispense Refill    albuterol (ACCUNEB) 1.25 mg/3 mL Nebu Take 3 mLs (1.25 mg total) by nebulization every 6 (six) hours as needed (cough, shortness of breath). Rescue 75 mL 11    albuterol (PROVENTIL/VENTOLIN HFA) 90 mcg/actuation inhaler Inhale 2 puffs into the lungs every 4 (four) hours as needed.      apixaban (ELIQUIS) 2.5 mg Tab 2.5 mg.      atorvastatin (LIPITOR) 20 MG tablet 20 mg.      benzonatate (TESSALON) 100 MG capsule Take 100 mg by mouth 3 (three) times daily as needed.       bumetanide (BUMEX) 1 MG tablet Take two tablet early in the morning and one right after lunch.      carvediloL (COREG) 3.125 MG tablet 3.125 mg.      cephALEXin (KEFLEX) 500 MG capsule 500 mg.      citalopram (CELEXA) 20 MG tablet 20 mg.      doxazosin (CARDURA) 8 MG Tab Take 8 mg by mouth once daily.      dutasteride (AVODART) 0.5 mg capsule Take 1 capsule by mouth once daily.      empagliflozin (JARDIANCE) 10 mg tablet Take 1 tablet (10 mg total) by mouth once daily. 90 tablet 1    fluocinonide 0.05% (LIDEX) 0.05 % cream APPLY SMALL AMOUNT TOPICALLY TWICE A DAY AS NEEDED ITCHING      insulin glargine-yfgn 100 unit/mL (3 mL) InPn ADMINISTER 8 UNITS UNDER THE SKIN PER COMMENTS:  FOR DIABETES  AT BEDTIME, AFTER 3 DAYS IF MORNING FASTING BLOOD SUGAR ABOVE 120,  INCREASE YOUR INSULIN BY 3 UNITS EVERY 3 DAYS TO GET  THE MORNING BLOOD  SUGAR UNDER CONTROL FOR DIABETES  AT BEDTIME, AFTER 3 DAYS IF MORNING FASTING BLOOD SUGAR ABOVE 120,  INCREASE YOUR INSULIN BY 3 UNITS EVERY 3 DAYS TO GET  THE MORNING BLOOD  SUGAR UNDER CONTROL      levothyroxine (SYNTHROID) 200 MCG tablet Take 1 tablet by mouth once daily.      predniSONE (DELTASONE) 10 MG tablet Take 2 pills once daily for 2 days and then 1 pill daily for 2 days. 6 tablet 0    pregabalin (LYRICA) 75 MG capsule Take 1 capsule (75 mg total) by mouth 2 (two) times daily. 60 capsule 12    rOPINIRole (REQUIP) 0.25 MG tablet 0.25 mg.      semaglutide (OZEMPIC) 1 mg/dose (4 mg/3 mL) ADMINISTER 1 MG UNDER THE SKIN EVERY WEEK FOR DIABETES      tiotropium (SPIRIVA) 18 mcg inhalation capsule INHALE 1 CAP BY MOUTH EVERY DAY      traMADoL (ULTRAM) 50 mg tablet Take 50 mg by mouth every 6 (six) hours as needed for Pain.      triamcinolone acetonide 0.1% (KENALOG) 0.1 % cream APPLY SMALL AMOUNT TOPICALLY TWICE A DAY AS NEEDED FOR ITCHING      vitamin D (VITAMIN D3) 1000 units Tab 25 mcg.       No current facility-administered medications on file prior to visit.       [facility]  MAINTENANCE THAT IS OVERDUE OR NEEDS TO BE UPDATED ON OUR CHART IS LISTED BELOW.  IF YOU HAVE HAD IT DONE ELSEWHERE, PLEASE SEND US DATES AND RECORDS IF YOU HAVE THEM TO MAKE YOUR CHART ACCURATE.  IF YOU HAVE NOT HAD THESE DONE AND ARE READY FOR US TO SCHEDULE THEM, PLEASE SEND US A MESSAGE.  Health Maintenance Due   Topic Date Due    Annual UACr  Never done    Shingles Vaccine (1 of 2) Never done    RSV Vaccine (Age 60+ and Pregnant patients) (1 - 1-dose 60+ series) Never done       DISCLAIMER: This note was compiled by using a speech recognition dictation system and therefore please be aware that typographical / speech recognition errors can and do occur.  Please contact me if you see any errors specifically.    Bao Mcarthur MD  We Offer Telehealth & Same Day Appointments!   Book your Telehealth appointment with me through my nurse or   Clinic appointments on [employer]!  Gndinr-[phone]     Check out my Facebook Page and Follow Me at: CLICK HERE    Check out my website at [employer] by clicking on: CLICK HERE    To Schedule appointments online, go to [employer]: CLICK HERE     Location: [email or web address]    [address] [place] [address]    FAX: [phone]

## 2024-04-19 NOTE — TELEPHONE ENCOUNTER
In short:: pt seen pulm- likes Dr. Olvera and she started him on a routine with neb for COPD, they did labs, follow up in July. [facility]. He isnt starting jardiance until bronchitis is resolved. [facility], [facility] attached to message. Stated RLS is flaring up, hurt his knee, will schedule him an appt if needed and he wants a resupply of the dexcom- referral for diabetes management pended. And he would like to know if there is something to do about the chronic low blood counts

## 2024-04-22 ENCOUNTER — TELEPHONE (OUTPATIENT)
Dept: DIABETES | Facility: CLINIC | Age: 89
End: 2024-04-22
Payer: OTHER GOVERNMENT

## 2024-04-22 NOTE — TELEPHONE ENCOUNTER
Called patient to assist with scheduling a new patient appointment with diabetes Unable to leave a message

## 2024-04-23 ENCOUNTER — TELEPHONE (OUTPATIENT)
Dept: DIABETES | Facility: CLINIC | Age: 89
End: 2024-04-23
Payer: OTHER GOVERNMENT

## 2024-04-23 LAB
A1AT PHENOTYP SERPL-IMP: ABNORMAL BANDS
A1AT SERPL NEPH-MCNC: 193 MG/DL (ref 100–190)

## 2024-04-26 ENCOUNTER — OFFICE VISIT (OUTPATIENT)
Dept: FAMILY MEDICINE | Facility: CLINIC | Age: 89
End: 2024-04-26
Payer: MEDICARE

## 2024-04-26 ENCOUNTER — HOSPITAL ENCOUNTER (OUTPATIENT)
Dept: RADIOLOGY | Facility: HOSPITAL | Age: 89
Discharge: HOME | End: 2024-04-26
Attending: FAMILY MEDICINE
Payer: MEDICARE

## 2024-04-26 VITALS
BODY MASS INDEX: 30.64 KG/M2 | WEIGHT: 214 LBS | SYSTOLIC BLOOD PRESSURE: 138 MMHG | HEART RATE: 96 BPM | OXYGEN SATURATION: 96 % | HEIGHT: 70 IN | DIASTOLIC BLOOD PRESSURE: 80 MMHG

## 2024-04-26 DIAGNOSIS — M25.562 ACUTE PAIN OF BOTH KNEES: ICD-10-CM

## 2024-04-26 DIAGNOSIS — M79.605 PAIN IN LEFT LEG: ICD-10-CM

## 2024-04-26 DIAGNOSIS — M79.89 LEG SWELLING: Primary | ICD-10-CM
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
## 2024-04-26 DIAGNOSIS — M25.561 ACUTE PAIN OF BOTH KNEES: ICD-10-CM

## 2024-04-26 PROCEDURE — 99214 OFFICE O/P EST MOD 30 MIN: CPT | Mod: S$PBB,,, | Performed by: FAMILY MEDICINE

## 2024-04-26 PROCEDURE — 99999 PR PBB SHADOW E&M-EST. PATIENT-LVL V: CPT | Mod: PBBFAC,,, | Performed by: FAMILY MEDICINE

## 2024-04-26 PROCEDURE — 73562 X-RAY EXAM OF KNEE 3: CPT | Mod: TC,50,PO

## 2024-04-26 PROCEDURE — 99215 OFFICE O/P EST HI 40 MIN: CPT | Mod: PBBFAC,PO,25 | Performed by: FAMILY MEDICINE

## 2024-04-26 PROCEDURE — 73562 X-RAY EXAM OF KNEE 3: CPT | Mod: 26,50,, | Performed by: RADIOLOGY

## 2024-04-26 RX ORDER — POTASSIUM CHLORIDE 750 MG/1
10 CAPSULE, EXTENDED RELEASE ORAL DAILY
Qty: 30 CAPSULE | Refills: 12 | Status: SHIPPED | OUTPATIENT
Start: 2024-04-26 | End: 2025-04-26

## 2024-04-26 RX ORDER — TRAMADOL HYDROCHLORIDE 50 MG/1
50 TABLET ORAL EVERY 8 HOURS PRN
Qty: 12 TABLET | Refills: 0 | Status: SHIPPED | OUTPATIENT
Start: 2024-04-26

## 2024-04-26 NOTE — PATIENT INSTRUCTIONS
Akira Serrano,     If you are due for any health screening(s) below please notify me so we can arrange them to be ordered and scheduled. Most healthy patients at your age complete them, but you are free to accept or refuse.     If you can't do it, I'll definitely understand. If you can, I'd certainly appreciate it!    All of your core healthy metrics are met.

## 2024-04-26 NOTE — PROGRESS NOTES
PLAN:    Assessment & Plan  1. Wound on his left lower leg subsequent to a fall.  An x-ray of the patient's knees will be ordered, and a home health order will be placed. In the event of any severe findings on the x-ray, a referral to orthopedics will be made for potential gel or steroids injections.    2. Fluid retention.  The patient is advised to increase his Bumex dosage to 2 mg in the morning and 2 mg in the afternoon. Additionally, potassium will be added to his regimen.    Problem List Items Addressed This Visit       Pain in left leg    Relevant Orders    US Lower Extremity Veins Bilateral    Leg swelling - Primary    Relevant Medications    potassium chloride (MICRO-K) 10 MEQ CpSR    Other Relevant Orders    Potassium    Ambulatory referral/consult to Home Health    US Lower Extremity Veins Bilateral     Other Visit Diagnoses       Fall, initial encounter        Relevant Medications    traMADoL (ULTRAM) 50 mg tablet    Other Relevant Orders    Ambulatory referral/consult to Home Health    Acute pain of both knees        Relevant Orders    Ambulatory referral/consult to Home Health    X-ray Knee Ortho Bilateral          Future Appointments       Date Provider Specialty Appt Notes    4/29/2024  Radiology     5/2/2024 Bret Hill MD Cardiology a fib    6/10/2024 Ramy Troncoso MD Nephrology type 2 diabetes/CKD3    6/27/2024 Genevieve Whitaker FNP Diabetes est care    7/15/2024  Radiology copd    7/15/2024  Pulmonology copd    7/15/2024  Pulmonology copd    7/15/2024 Tomas Tang MD Pulmonology copd           Medication Management for assessment above:   Medication List with Changes/Refills   New Medications    POTASSIUM CHLORIDE (MICRO-K) 10 MEQ CPSR    Take 1 capsule (10 mEq total) by mouth once daily.   Current Medications    ALBUTEROL (ACCUNEB) 1.25 MG/3 ML NEBU    Take 3 mLs (1.25 mg total) by nebulization every 6 (six) hours as needed (cough, shortness of breath). Rescue     ALBUTEROL (PROVENTIL/VENTOLIN HFA) 90 MCG/ACTUATION INHALER    Inhale 2 puffs into the lungs every 4 (four) hours as needed.    APIXABAN (ELIQUIS) 2.5 MG TAB    2.5 mg.    ATORVASTATIN (LIPITOR) 20 MG TABLET    20 mg.    BUMETANIDE (BUMEX) 1 MG TABLET    Take two tablet early in the morning and one right after lunch.    CARVEDILOL (COREG) 3.125 MG TABLET    3.125 mg.    CITALOPRAM (CELEXA) 20 MG TABLET    20 mg.    DOXAZOSIN (CARDURA) 8 MG TAB    Take 8 mg by mouth once daily.    DUTASTERIDE (AVODART) 0.5 MG CAPSULE    Take 1 capsule by mouth once daily.    EMPAGLIFLOZIN (JARDIANCE) 10 MG TABLET    Take 1 tablet (10 mg total) by mouth once daily.    FLUOCINONIDE 0.05% (LIDEX) 0.05 % CREAM    APPLY SMALL AMOUNT TOPICALLY TWICE A DAY AS NEEDED ITCHING    LEVOTHYROXINE (SYNTHROID) 200 MCG TABLET    Take 1 tablet by mouth once daily.    PREGABALIN (LYRICA) 75 MG CAPSULE    Take 1 capsule (75 mg total) by mouth 2 (two) times daily.    ROPINIROLE (REQUIP) 0.25 MG TABLET    0.25 mg.    SEMAGLUTIDE (OZEMPIC) 1 MG/DOSE (4 MG/3 ML)    ADMINISTER 1 MG UNDER THE SKIN EVERY WEEK FOR DIABETES    TIOTROPIUM (SPIRIVA) 18 MCG INHALATION CAPSULE    INHALE 1 CAP BY MOUTH EVERY DAY    TRIAMCINOLONE ACETONIDE 0.1% (KENALOG) 0.1 % CREAM    APPLY SMALL AMOUNT TOPICALLY TWICE A DAY AS NEEDED FOR ITCHING    VITAMIN D (VITAMIN D3) 1000 UNITS TAB    25 mcg.   Changed and/or Refilled Medications    Modified Medication Previous Medication    TRAMADOL (ULTRAM) 50 MG TABLET traMADoL (ULTRAM) 50 mg tablet       Take 1 tablet (50 mg total) by mouth every 8 (eight) hours as needed for Pain.    Take 50 mg by mouth every 6 (six) hours as needed for Pain.   Discontinued Medications    BENZONATATE (TESSALON) 100 MG CAPSULE    Take 100 mg by mouth 3 (three) times daily as needed.    CEPHALEXIN (KEFLEX) 500 MG CAPSULE    500 mg.    INSULIN GLARGINE-YFGN 100 UNIT/ML (3 ML) INPN    ADMINISTER 8 UNITS UNDER THE SKIN PER COMMENTS:  FOR DIABETES  AT BEDTIME,  AFTER 3 DAYS IF MORNING FASTING BLOOD SUGAR ABOVE 120,  INCREASE YOUR INSULIN BY 3 UNITS EVERY 3 DAYS TO GET  THE MORNING BLOOD  SUGAR UNDER CONTROL FOR DIABETES  AT BEDTIME, AFTER 3 DAYS IF MORNING FASTING BLOOD SUGAR ABOVE 120,  INCREASE YOUR INSULIN BY 3 UNITS EVERY 3 DAYS TO GET  THE MORNING BLOOD  SUGAR UNDER CONTROL    PREDNISONE (DELTASONE) 10 MG TABLET    Take 2 pills once daily for 2 days and then 1 pill daily for 2 days.       Bao Mcarthur M.D.  ==========================================================================  Subjective:   Patient ID: Zach Lund is a 89 y.o. male.  has no past medical history on file.   Chief Complaint: Leg Pain (both)      History of Present Illness  The patient is a 89-year-old male who is coming in for leg pain after a fall. He has a wound on his left lower leg. He wanted a refill on his tramadol. He is accompanied by an adult male, his son.    The patient experienced a fall approximately 1.5 weeks ago, resulting in a wound on his left lower leg. The incident occurred while he was exiting the bathroom, reaching over to grab his headrest from the wheelchair. After a brief rest period, he took a backward step, resulting in a loss of control of his legs and a twisting motion. He fell on his back, but his knees became caught in his chair, leading to a twisting motion. He reports bilateral ankle pain radiating from the ankle to the groin, which intensifies upon standing, preventing him from taking a forward step, necessitating sideways steps. His knees are particularly painful when he attempts to stretch them, but not at rest. His mobility is limited due to knee pain, preventing him from walking, standing, and getting in and out of bed. He is not currently receiving home health care. His pain management regimen includes tramadol, which he uses sparingly, approximately once every 3 weeks.    The patient's son reports that he is retaining water, despite adequate fluid  intake. He is currently on a diuretic, Bumex 1 mg, which he took 2 today, with minimal relief. His weight has decreased from 222 to 214. His baseline weight is typically around 198. He has an appointment with cardiology and pulmonology next week. He is not currently taking potassium, but has been consuming bananas for the past 2 days, which he reports as beneficial.    Supplemental Information  He did start Jardiance this week. He is not taking Lantus anymore. His blood sugar goes up when he eats and comes back down. His blood sugar is 120 right now.    BMI Readings from Last 10 Encounters:   04/26/24 30.71 kg/m²   04/18/24 29.56 kg/m²   03/22/24 30.13 kg/m²       Problem List Items Addressed This Visit       Pain in left leg    Leg swelling - Primary     Other Visit Diagnoses       Fall, initial encounter        Acute pain of both knees                 Review of patient's allergies indicates:   Allergen Reactions    Iodinated contrast media Anaphylaxis    Iodine Anaphylaxis     Breathing issue    Lobster Anaphylaxis     Current Outpatient Medications   Medication Instructions    albuterol (ACCUNEB) 1.25 mg, Nebulization, Every 6 hours PRN, Rescue    albuterol (PROVENTIL/VENTOLIN HFA) 90 mcg/actuation inhaler 2 puffs, Inhalation, Every 4 hours PRN    apixaban (ELIQUIS) 2.5 mg    atorvastatin (LIPITOR) 20 mg    bumetanide (BUMEX) 1 MG tablet Take two tablet early in the morning and one right after lunch.    carvediloL (COREG) 3.125 mg    citalopram (CELEXA) 20 mg    doxazosin (CARDURA) 8 mg, Oral, Daily    dutasteride (AVODART) 0.5 mg capsule 1 capsule, Oral, Daily    empagliflozin (JARDIANCE) 10 mg, Oral, Daily    fluocinonide 0.05% (LIDEX) 0.05 % cream APPLY SMALL AMOUNT TOPICALLY TWICE A DAY AS NEEDED ITCHING    levothyroxine (SYNTHROID) 200 MCG tablet 1 tablet, Oral, Daily    potassium chloride (MICRO-K) 10 MEQ CpSR 10 mEq, Oral, Daily    pregabalin (LYRICA) 75 mg, Oral, 2 times daily    rOPINIRole (REQUIP) 0.25  "mg    semaglutide (OZEMPIC) 1 mg/dose (4 mg/3 mL) ADMINISTER 1 MG UNDER THE SKIN EVERY WEEK FOR DIABETES    tiotropium (SPIRIVA) 18 mcg inhalation capsule INHALE 1 CAP BY MOUTH EVERY DAY    traMADoL (ULTRAM) 50 mg, Oral, Every 8 hours PRN    triamcinolone acetonide 0.1% (KENALOG) 0.1 % cream APPLY SMALL AMOUNT TOPICALLY TWICE A DAY AS NEEDED FOR ITCHING    vitamin D (VITAMIN D3) 25 mcg      I have reviewed the PMH, social history, FamilyHx, surgical history, allergies and medications documented / confirmed by the patient at the time of this visit.  Review of Systems   Constitutional:  Negative for chills, fatigue, fever and unexpected weight change.   HENT:  Negative for ear pain and sore throat.    Eyes:  Negative for redness and visual disturbance.   Respiratory:  Negative for cough and shortness of breath.    Cardiovascular:  Positive for leg swelling. Negative for chest pain and palpitations.   Gastrointestinal:  Negative for nausea and vomiting.   Endocrine: Negative for cold intolerance and heat intolerance.   Genitourinary:  Negative for difficulty urinating and hematuria.   Musculoskeletal:  Positive for arthralgias. Negative for myalgias.   Skin:  Positive for rash and wound.   Allergic/Immunologic: Negative for environmental allergies and food allergies.   Neurological:  Positive for weakness. Negative for headaches.   Hematological:  Negative for adenopathy. Bruises/bleeds easily.   Psychiatric/Behavioral:  Negative for sleep disturbance. The patient is not nervous/anxious.      Objective:   /80   Pulse 96   Ht 5' 10" (1.778 m)   Wt 97.1 kg (214 lb)   SpO2 96%   BMI 30.71 kg/m²   Physical Exam  Vitals and nursing note reviewed.   Constitutional:       General: He is not in acute distress.     Appearance: He is well-developed. He is not diaphoretic.      Comments:   Present with son, sitting in wheelchair   HENT:      Head: Normocephalic and atraumatic.      Right Ear: External ear normal.      " Left Ear: External ear normal.      Nose: Nose normal. No rhinorrhea.   Eyes:      Extraocular Movements: Extraocular movements intact.      Pupils: Pupils are equal, round, and reactive to light.   Cardiovascular:      Rate and Rhythm: Rhythm irregular.      Pulses: Normal pulses.      Heart sounds: Murmur heard.   Pulmonary:      Effort: Pulmonary effort is normal. No respiratory distress.      Breath sounds: No wheezing.   Abdominal:      General: Bowel sounds are normal.      Palpations: Abdomen is soft.   Musculoskeletal:         General: Tenderness and deformity present. Normal range of motion.      Cervical back: Normal range of motion and neck supple.      Right lower leg: Edema present.      Left lower leg: Edema present.   Skin:     General: Skin is warm and dry.      Capillary Refill: Capillary refill takes less than 2 seconds.      Findings: Lesion and rash present.   Neurological:      General: No focal deficit present.      Mental Status: He is alert and oriented to person, place, and time. Mental status is at baseline.      Cranial Nerves: No cranial nerve deficit.      Motor: Weakness present.      Gait: Gait abnormal.   Psychiatric:         Attention and Perception: He is attentive.         Mood and Affect: Mood normal. Mood is not anxious or depressed. Affect is not labile, blunt, angry or inappropriate.         Speech: He is communicative. Speech is not rapid and pressured, delayed, slurred or tangential.         Behavior: Behavior normal. Behavior is not agitated, slowed, aggressive, withdrawn, hyperactive or combative.         Thought Content: Thought content normal. Thought content is not paranoid or delusional. Thought content does not include homicidal or suicidal ideation. Thought content does not include homicidal or suicidal plan.         Cognition and Memory: Memory is not impaired.         Judgment: Judgment normal. Judgment is not impulsive or inappropriate.       Physical  Exam            Results  Laboratory Studies  CBC shows mild anemia.    Assessment:     1. Leg swelling    2. Fall, initial encounter    3. Acute pain of both knees    4. Pain in left leg      MDM:   Moderate medical complexity.  Moderate risk.  Total time: 21 minutes.  This includes total time spent on the encounter, which includes face to face time and non-face to face time preparing to see the patient (eg, review of previous medical records, tests), Obtaining and/or reviewing separately obtained history, documenting clinical information in the electronic or other health record, independently interpreting results (not separately reported)/communicating results to the patient/family/caregiver, and/or care coordination (not separately reported).    I have Reviewed and summarized old records.  I have performed thorough medication reconciliation today and discussed risk and benefits of medications.  I have reviewed labs and discussed with patient.  All questions were answered.  Visit today included increased complexity associated with the care of the episodic problem see above assessment addressed and managing the longitudinal care of the patient due to the serious and/or complex managed problem(s) see above.  I have signed for the following orders AND/OR meds.  Orders Placed This Encounter   Procedures    X-ray Knee Ortho Bilateral     Standing Status:   Future     Number of Occurrences:   1     Standing Expiration Date:   4/26/2025     Order Specific Question:   May the Radiologist modify the order per protocol to meet the clinical needs of the patient?     Answer:   Yes     Order Specific Question:   Release to patient     Answer:   Immediate    US Lower Extremity Veins Bilateral     Standing Status:   Future     Standing Expiration Date:   4/26/2025     Order Specific Question:   May the Radiologist modify the order per protocol to meet the clinical needs of the patient?     Answer:   Yes    Potassium     Standing  Status:   Future     Standing Expiration Date:   6/25/2025    Ambulatory referral/consult to Home Health     Standing Status:   Future     Standing Expiration Date:   5/26/2025     Referral Priority:   Routine     Referral Type:   Home Health     Referral Reason:   Specialty Services Required     Requested Specialty:   Home Health Services     Number of Visits Requested:   1     Medications Ordered This Encounter   Medications    potassium chloride (MICRO-K) 10 MEQ CpSR     Sig: Take 1 capsule (10 mEq total) by mouth once daily.     Dispense:  30 capsule     Refill:  12    traMADoL (ULTRAM) 50 mg tablet     Sig: Take 1 tablet (50 mg total) by mouth every 8 (eight) hours as needed for Pain.     Dispense:  12 tablet     Refill:  0     Quantity prescribed more than 7 day supply? No     Order Specific Question:   I have reviewed the Prescription Drug Monitoring Program (PDMP) database for this patient prior to prescribing the above opioid medication     Answer:   Yes        No follow-ups on file.  Future Appointments       Date Provider Specialty Appt Notes    4/29/2024  Radiology     5/2/2024 Bret Hill MD Cardiology a fib    6/10/2024 Ramy Troncoso MD Nephrology type 2 diabetes/CKD3    6/27/2024 Genevieve Whitaker, LUCAS Diabetes est care    7/15/2024  Radiology copd    7/15/2024  Pulmonology copd    7/15/2024  Pulmonology copd    7/15/2024 Tomas Tang MD Pulmonology copd          If no improvement in symptoms or symptoms worsen, advised to call/follow-up at clinic or go to ER. Patient voiced understanding and all questions/concerns were addressed.   DISCLAIMER: This note was compiled by using a speech recognition dictation system and therefore please be aware that typographical / speech recognition errors can and do occur.  Please contact me if you see any errors specifically.  Consent was obtained for [employer] recording system prior to the visit.    Bao Mcarthur M.D.       Office:  [phone] [address] [place] [address]  FAX: [phone]

## 2024-04-27 DIAGNOSIS — M25.562 ACUTE PAIN OF BOTH KNEES: Primary | ICD-10-CM

## 2024-04-27 DIAGNOSIS — M25.561 ACUTE PAIN OF BOTH KNEES: Primary | ICD-10-CM

## 2024-04-27 NOTE — PROGRESS NOTES
1st check to see if patient has seen the results.  If not then  CALL patient with results and Document verification.  Schedule follow-up if needed.  [phone]  X-ray of the knees reviewed bilaterally by radiology.  There is moderate arthritis seen.  No acute fracture dislocation.  I recommend that you follow-up with orthopedics if no improvement.  Referral has been placed.

## 2024-04-30 ENCOUNTER — PATIENT MESSAGE (OUTPATIENT)
Dept: FAMILY MEDICINE | Facility: CLINIC | Age: 89
End: 2024-04-30
Payer: OTHER GOVERNMENT

## 2024-04-30 ENCOUNTER — TELEPHONE (OUTPATIENT)
Dept: FAMILY MEDICINE | Facility: CLINIC | Age: 89
End: 2024-04-30
Payer: OTHER GOVERNMENT

## 2024-04-30 ENCOUNTER — HOSPITAL ENCOUNTER (OUTPATIENT)
Dept: RADIOLOGY | Facility: HOSPITAL | Age: 89
Discharge: HOME | End: 2024-04-30
Attending: FAMILY MEDICINE
Payer: MEDICARE

## 2024-04-30 DIAGNOSIS — M79.605 PAIN IN LEFT LEG: ICD-10-CM

## 2024-04-30 DIAGNOSIS — M79.89 LEG SWELLING: ICD-10-CM

## 2024-04-30 PROCEDURE — 93970 EXTREMITY STUDY: CPT | Mod: TC,PO

## 2024-04-30 PROCEDURE — 93970 EXTREMITY STUDY: CPT | Mod: 26,,, | Performed by: RADIOLOGY

## 2024-04-30 NOTE — PROGRESS NOTES
1st check to see if patient has seen the results.  If not then  CALL patient with results and Document verification.  Schedule follow-up if needed.  [phone]    Ultrasound is negative for blood clot.

## 2024-04-30 NOTE — TELEPHONE ENCOUNTER
----- Message from Bao Mcarthur MD sent at 4/30/2024  1:48 PM CDT -----  Regarding: RE: appointment  Patient needs to have physical therapy eval through home health and see Orthopedics 1st.  ----- Message -----  From: Terry Weinstein MA  Sent: 4/30/2024   1:47 PM CDT  To: Bao Mcarthur MD  Subject: FW: appointment                                  FYI  ----- Message -----  From: Niki Rodrigues MA  Sent: 4/30/2024  12:46 PM CDT  To: Terry Weinstein MA; Wagner Hein Staff  Subject: RE: appointment                                  Hello,    I called patient to give an earlier appt.   Patient's son said they requested you to order an MRI before he sees the orthopedic doctor.  Please call patient and get MRI scheduled  ----- Message -----  From: Terry Weinstein MA  Sent: 4/30/2024  12:27 PM CDT  To: Wagner Hein Staff  Subject: appointment                                      Good afternoon I talked to Dr. Mcarthur and he is asking id it is possible to get this patient in sooner for an appointment for his severe knee pain.

## 2024-04-30 NOTE — TELEPHONE ENCOUNTER
Just called and spoke with Hugh, pt son. Let him know the xrays have been done and if Dr. Hein wants an MRI and feels it is necessary, he can order it. Please if you dont mind please reach back out to get him in sooner if at all possible.

## 2024-04-30 NOTE — TELEPHONE ENCOUNTER
Please get the patient in with orthopedics this week for his severe knee pain.  Coordinate with their staff.

## 2024-05-02 ENCOUNTER — HOSPITAL ENCOUNTER (OUTPATIENT)
Dept: CARDIOLOGY | Facility: HOSPITAL | Age: 89
Discharge: HOME | End: 2024-05-02
Attending: INTERNAL MEDICINE
Payer: MEDICARE

## 2024-05-02 ENCOUNTER — OFFICE VISIT (OUTPATIENT)
Dept: CARDIOLOGY | Facility: CLINIC | Age: 89
End: 2024-05-02
Payer: MEDICARE

## 2024-05-02 VITALS
HEIGHT: 70 IN | WEIGHT: 208 LBS | OXYGEN SATURATION: 97 % | BODY MASS INDEX: 29.78 KG/M2 | SYSTOLIC BLOOD PRESSURE: 138 MMHG | HEART RATE: 50 BPM | DIASTOLIC BLOOD PRESSURE: 78 MMHG

## 2024-05-02 DIAGNOSIS — I50.30 HEART FAILURE WITH PRESERVED EJECTION FRACTION, UNSPECIFIED HF CHRONICITY: ICD-10-CM

## 2024-05-02 DIAGNOSIS — I48.91 ATRIAL FIBRILLATION, UNSPECIFIED TYPE: Primary | Chronic | ICD-10-CM

## 2024-05-02 DIAGNOSIS — I48.91 ATRIAL FIBRILLATION, UNSPECIFIED TYPE: Chronic | ICD-10-CM

## 2024-05-02 DIAGNOSIS — J41.0 SIMPLE CHRONIC BRONCHITIS: Primary | Chronic | ICD-10-CM

## 2024-05-02 DIAGNOSIS — G47.33 OBSTRUCTIVE SLEEP APNEA SYNDROME: ICD-10-CM

## 2024-05-02 DIAGNOSIS — I10 ESSENTIAL HYPERTENSION: ICD-10-CM

## 2024-05-02 DIAGNOSIS — N18.32 STAGE 3B CHRONIC KIDNEY DISEASE: Chronic | ICD-10-CM

## 2024-05-02 DIAGNOSIS — E78.5 DYSLIPIDEMIA: ICD-10-CM

## 2024-05-02 DIAGNOSIS — I25.10 ARTERIOSCLEROSIS OF CORONARY ARTERY: ICD-10-CM

## 2024-05-02 DIAGNOSIS — I70.0 ATHEROSCLEROSIS OF AORTA: Chronic | ICD-10-CM

## 2024-05-02 DIAGNOSIS — R00.1 SINUS BRADYCARDIA: ICD-10-CM

## 2024-05-02 PROCEDURE — 99205 OFFICE O/P NEW HI 60 MIN: CPT | Mod: S$PBB,,, | Performed by: INTERNAL MEDICINE

## 2024-05-02 PROCEDURE — 93005 ELECTROCARDIOGRAM TRACING: CPT | Mod: PO

## 2024-05-02 PROCEDURE — 99999 PR PBB SHADOW E&M-EST. PATIENT-LVL V: CPT | Mod: PBBFAC,,, | Performed by: INTERNAL MEDICINE

## 2024-05-02 PROCEDURE — 99215 OFFICE O/P EST HI 40 MIN: CPT | Mod: PBBFAC,25,PO | Performed by: INTERNAL MEDICINE

## 2024-05-02 PROCEDURE — 93010 ELECTROCARDIOGRAM REPORT: CPT | Mod: ,,, | Performed by: INTERNAL MEDICINE

## 2024-05-02 PROCEDURE — G0180 MD CERTIFICATION HHA PATIENT: HCPCS | Mod: ,,, | Performed by: FAMILY MEDICINE

## 2024-05-02 NOTE — PROGRESS NOTES
Subjective:   Patient ID:  Zach Lund is a 89 y.o. male who presents for follow-up of No chief complaint on file.  Pt to establish care - moved from [facility]  h/o HTN, afib, CKD, BPH, DM   BP at home 160s-170s  Patient denies CP, angina or anginal equivalent.  11/9/2020 Echo: The left ventricular ejection fraction is 60-65% by visual estimation.   Congestive Heart Failure  Presents for follow-up visit. Pertinent negatives include no abdominal pain, chest pain, chest pressure, claudication, edema, fatigue, muscle weakness, near-syncope, nocturia, orthopnea, palpitations, paroxysmal nocturnal dyspnea, shortness of breath or unexpected weight change. The symptoms have been stable. Compliance with total regimen is %. Compliance with diet is %. Compliance with exercise is %. Compliance with medications is %.   Atrial Fibrillation  Presents for follow-up visit. Symptoms are negative for bradycardia, chest pain, dizziness, palpitations, shortness of breath, syncope, tachycardia and weakness. The symptoms have been stable. Past medical history includes atrial fibrillation and CHF. There are no medication compliance problems.       Review of Systems   Constitutional: Negative. Negative for fatigue, unexpected weight change and weight gain.   HENT: Negative.     Eyes: Negative.    Cardiovascular: Negative.  Negative for chest pain, claudication, leg swelling, near-syncope, palpitations and syncope.   Respiratory:  Negative for shortness of breath.    Endocrine: Negative.    Hematologic/Lymphatic: Negative.    Skin: Negative.    Musculoskeletal:  Negative for muscle weakness.   Gastrointestinal: Negative.  Negative for abdominal pain.   Genitourinary: Negative.  Negative for nocturia.   Neurological: Negative.  Negative for dizziness and weakness.   Psychiatric/Behavioral: Negative.     Allergic/Immunologic: Negative.    All other systems reviewed and are negative.    Family History    Problem Relation Name Age of Onset    Arthritis Mother Jacque     Mental illness Father Brandon     Cancer Brother Don      No past medical history on file.  Social History     Socioeconomic History    Marital status:    Tobacco Use    Smoking status: Former     Current packs/day: 0.00     Average packs/day: 0.1 packs/day for 45.0 years (2.3 total pack years)     Types: Cigarettes     Start date: 1955     Quit date: 2000     Years since quittin.3    Tobacco comments:     never a regular smoker.  would only have one in social situations   Substance and Sexual Activity    Drug use: Never    Sexual activity: Not Currently     Partners: Female     Birth control/protection: Other-see comments     Comment: My stuff doesnt work anymore     Social Determinants of Health     Financial Resource Strain: Medium Risk (3/19/2024)    Overall Financial Resource Strain (CARDIA)     Difficulty of Paying Living Expenses: Somewhat hard   Food Insecurity: Food Insecurity Present (3/19/2024)    Hunger Vital Sign     Worried About Running Out of Food in the Last Year: Sometimes true     Ran Out of Food in the Last Year: Never true   Transportation Needs: Unmet Transportation Needs (3/19/2024)    PRAPARE - Transportation     Lack of Transportation (Medical): Yes     Lack of Transportation (Non-Medical): No   Physical Activity: Inactive (3/19/2024)    Exercise Vital Sign     Days of Exercise per Week: 0 days     Minutes of Exercise per Session: 0 min   Stress: Stress Concern Present (3/19/2024)    [employer] [place] [employer] - Occupational Stress Questionnaire     Feeling of Stress : To some extent   Housing Stability: High Risk (3/19/2024)    Housing Stability Vital Sign     Unable to Pay for Housing in the Last Year: No     Number of Places Lived in the Last Year: 4     Unstable Housing in the Last Year: No     Current Outpatient Medications on File Prior to Visit   Medication Sig Dispense Refill    albuterol  (ACCUNEB) 1.25 mg/3 mL Nebu Take 3 mLs (1.25 mg total) by nebulization every 6 (six) hours as needed (cough, shortness of breath). Rescue 75 mL 11    albuterol (PROVENTIL/VENTOLIN HFA) 90 mcg/actuation inhaler Inhale 2 puffs into the lungs every 4 (four) hours as needed.      apixaban (ELIQUIS) 2.5 mg Tab 2.5 mg.      atorvastatin (LIPITOR) 20 MG tablet 20 mg.      bumetanide (BUMEX) 1 MG tablet Take two tablet early in the morning and one right after lunch.      carvediloL (COREG) 3.125 MG tablet 3.125 mg.      citalopram (CELEXA) 20 MG tablet 20 mg.      doxazosin (CARDURA) 8 MG Tab Take 8 mg by mouth once daily.      dutasteride (AVODART) 0.5 mg capsule Take 1 capsule by mouth once daily.      empagliflozin (JARDIANCE) 10 mg tablet Take 1 tablet (10 mg total) by mouth once daily. 90 tablet 1    fluocinonide 0.05% (LIDEX) 0.05 % cream APPLY SMALL AMOUNT TOPICALLY TWICE A DAY AS NEEDED ITCHING      levothyroxine (SYNTHROID) 200 MCG tablet Take 1 tablet by mouth once daily.      potassium chloride (MICRO-K) 10 MEQ CpSR Take 1 capsule (10 mEq total) by mouth once daily. 30 capsule 12    pregabalin (LYRICA) 75 MG capsule Take 1 capsule (75 mg total) by mouth 2 (two) times daily. 60 capsule 12    rOPINIRole (REQUIP) 0.25 MG tablet 0.25 mg.      semaglutide (OZEMPIC) 1 mg/dose (4 mg/3 mL) ADMINISTER 1 MG UNDER THE SKIN EVERY WEEK FOR DIABETES      tiotropium (SPIRIVA) 18 mcg inhalation capsule INHALE 1 CAP BY MOUTH EVERY DAY      traMADoL (ULTRAM) 50 mg tablet Take 1 tablet (50 mg total) by mouth every 8 (eight) hours as needed for Pain. 12 tablet 0    triamcinolone acetonide 0.1% (KENALOG) 0.1 % cream APPLY SMALL AMOUNT TOPICALLY TWICE A DAY AS NEEDED FOR ITCHING      vitamin D (VITAMIN D3) 1000 units Tab 25 mcg.       No current facility-administered medications on file prior to visit.     Review of patient's allergies indicates:   Allergen Reactions    Iodinated contrast media Anaphylaxis    Iodine Anaphylaxis      Breathing issue    Lobster Anaphylaxis       Objective:     Physical Exam  Vitals and nursing note reviewed.   Constitutional:       Appearance: He is well-developed.   HENT:      Head: Normocephalic and atraumatic.   Eyes:      Conjunctiva/sclera: Conjunctivae normal.      Pupils: Pupils are equal, round, and reactive to light.   Cardiovascular:      Rate and Rhythm: Normal rate and regular rhythm.      Pulses: Intact distal pulses.      Heart sounds: Normal heart sounds.   Pulmonary:      Effort: Pulmonary effort is normal.      Breath sounds: Normal breath sounds.   Abdominal:      General: Bowel sounds are normal.      Palpations: Abdomen is soft.   Musculoskeletal:      Cervical back: Normal range of motion and neck supple.   Skin:     General: Skin is warm and dry.   Neurological:      Mental Status: He is alert and oriented to person, place, and time.         Assessment:     1. Simple chronic bronchitis    2. Atrial fibrillation, unspecified type    3. Arteriosclerosis of coronary artery    4. Atherosclerosis of aorta    5. Dyslipidemia    6. Essential hypertension    7. Heart failure with preserved ejection fraction, unspecified HF chronicity    8. Sinus bradycardia    9. Stage 3b chronic kidney disease    10. Obstructive sleep apnea syndrome        Plan:     Simple chronic bronchitis    Atrial fibrillation, unspecified type  -     Ambulatory referral/consult to Cardiology    Arteriosclerosis of coronary artery    Atherosclerosis of aorta    Dyslipidemia    Essential hypertension    Heart failure with preserved ejection fraction, unspecified HF chronicity    Sinus bradycardia    Stage 3b chronic kidney disease    Obstructive sleep apnea syndrome      BP diary  Continue coreg, diuretics- HTN/CHF  Continue eliquis- PAF

## 2024-05-04 LAB
OHS QRS DURATION: 150 MS
OHS QTC CALCULATION: 442 MS

## 2024-05-06 ENCOUNTER — TELEPHONE (OUTPATIENT)
Dept: ORTHOPEDICS | Facility: CLINIC | Age: 89
End: 2024-05-06

## 2024-05-06 ENCOUNTER — OFFICE VISIT (OUTPATIENT)
Dept: ORTHOPEDICS | Facility: CLINIC | Age: 89
End: 2024-05-06
Payer: MEDICARE

## 2024-05-06 VITALS — HEIGHT: 70 IN | BODY MASS INDEX: 29.76 KG/M2 | WEIGHT: 207.88 LBS

## 2024-05-06 DIAGNOSIS — M17.0 PRIMARY OSTEOARTHRITIS OF BOTH KNEES: Primary | ICD-10-CM

## 2024-05-06 DIAGNOSIS — M25.562 ACUTE PAIN OF BOTH KNEES: ICD-10-CM

## 2024-05-06 DIAGNOSIS — M25.561 ACUTE PAIN OF BOTH KNEES: ICD-10-CM

## 2024-05-06 DIAGNOSIS — R53.81 PHYSICAL DECONDITIONING: ICD-10-CM

## 2024-05-06 PROCEDURE — 99215 OFFICE O/P EST HI 40 MIN: CPT | Mod: PBBFAC,PO | Performed by: STUDENT IN AN ORGANIZED HEALTH CARE EDUCATION/TRAINING PROGRAM

## 2024-05-06 PROCEDURE — G2211 COMPLEX E/M VISIT ADD ON: HCPCS | Mod: S$PBB,,, | Performed by: STUDENT IN AN ORGANIZED HEALTH CARE EDUCATION/TRAINING PROGRAM

## 2024-05-06 PROCEDURE — 99999 PR PBB SHADOW E&M-EST. PATIENT-LVL V: CPT | Mod: PBBFAC,,, | Performed by: STUDENT IN AN ORGANIZED HEALTH CARE EDUCATION/TRAINING PROGRAM

## 2024-05-06 PROCEDURE — 99205 OFFICE O/P NEW HI 60 MIN: CPT | Mod: S$PBB,,, | Performed by: STUDENT IN AN ORGANIZED HEALTH CARE EDUCATION/TRAINING PROGRAM

## 2024-05-06 NOTE — PATIENT INSTRUCTIONS
Assessment:  Zach Lund is a 89 y.o. male   Chief Complaint   Patient presents with    Left Knee - Pain    Right Knee - Pain       Encounter Diagnoses   Name Primary?    Acute pain of both knees     Primary osteoarthritis of both knees Yes    Physical deconditioning         Plan:  MRI of bilateral knees  Apply topical diclofenac (Voltaren) up to 4 times a day to the affected area.  It can be bought over the counter at any local pharmacy.    Patient may ice every 2 hours for 15 minutes as needed to control pain and swelling.   Pre auth for one series bilateral gel injections.  Monovisc        Follow-up: for gel injections.    Thank you for choosing [facility] and Dr. Wagner Hein for your orthopedic & sports medicine care. It is our goal to provide you with exceptional care that will help keep you healthy, active, and get you back in the game.    Please do not hesitate to reach out to us via email, phone, or MyChart with any questions, concerns, or feedback.    If you felt that you received exemplary care today, please consider leaving us feedback on [employer] at:  [email or web address]    If you are experiencing pain/discomfort ,or have questions after 5pm and would like to be connected to the [facility] on-call team, please call this number and specify which Sports Medicine provider is treating you: [phone]

## 2024-05-06 NOTE — TELEPHONE ENCOUNTER
Returned patient call to let him know that MRI orders have been faxed to [facility] and that he can wait for [facility] to contact him or he can call them to schedule.  Pt stated he will contact our office tomorrow for their contact info to schedule.   ----- Message from Jaye Frank sent at 5/6/2024  3:53 PM CDT -----  Contact: pt  Type:  Patient Returning Call    Who Called: opt  Who Left Message for Patient:  PATY  Does the patient know what this is regarding?:   Would the patient rather a call back or a response via [facility]? phone  Best Call Back Number: [phone]  Additional Information:

## 2024-05-06 NOTE — TELEPHONE ENCOUNTER
Faxed and received email confirmation of receipt for MRI orders for bilateral knees to [facility].  No authorization was required per Leoncio in pre auth department.  Contacted pt and pt's son to let them know that orders have been faxed.

## 2024-05-06 NOTE — PROGRESS NOTES
Patient ID: Zach Lund  YOB: 1933  MRN: [id number]    Chief Complaint: Pain of the Left Knee and Pain of the Right Knee      Referred By: Bao Mcarthur MD for bilateral leg/knee pain    History of Present Illness: Zach Lund is a right-hand dominant 89 y.o. male who presents today with bilateral knee/ leg pain after a fall. He has a wound on his left lower leg. He is accompanied by his son.  The patient experienced a fall approximately 4 weeks ago. He was exiting the bathroom, reaching over to grab his headrest from the wheelchair. After a brief rest period, he took a backward step, resulting in a loss of control of his legs and a twisting motion. He fell backwards, but his knees became caught in his chair, leading to a twisting motion. He reports bilateral ankle pain radiating from the ankle to the groin, which intensifies upon standing, preventing him from taking a forward step, necessitating sideways steps. His knees are particularly painful when he attempts to stretch them, but not at rest. His mobility is limited due to knee pain, preventing him from walking, standing, and getting in and out of bed. He is not currently receiving home health care. His pain management regimen includes tramadol, which he uses sparingly,    The patient is active in none.  Occupation: retired      Past Medical History:   No past medical history on file.  Past Surgical History:   Procedure Laterality Date    CHOLECYSTECTOMY  2012    EYE SURGERY  2011    cataracts    PROSTATE SURGERY  2017    Urolift    TONSILLECTOMY       Family History   Problem Relation Name Age of Onset    Arthritis Mother Jacque     Mental illness Father Brandon     Cancer Brother Don      Social History     Socioeconomic History    Marital status:    Tobacco Use    Smoking status: Former     Current packs/day: 0.00     Average packs/day: 0.1 packs/day for 45.0 years (2.3 ttl pk-yrs)     Types: Cigarettes     Start date:  1955     Quit date: 2000     Years since quittin.3    Tobacco comments:     never a regular smoker.  would only have one in social situations   Substance and Sexual Activity    Drug use: Never    Sexual activity: Not Currently     Partners: Female     Birth control/protection: Other-see comments     Comment: My stuff doesnt work anymore     Social Determinants of Health     Financial Resource Strain: Medium Risk (3/19/2024)    Overall Financial Resource Strain (CARDIA)     Difficulty of Paying Living Expenses: Somewhat hard   Food Insecurity: Food Insecurity Present (3/19/2024)    Hunger Vital Sign     Worried About Running Out of Food in the Last Year: Sometimes true     Ran Out of Food in the Last Year: Never true   Transportation Needs: Unmet Transportation Needs (3/19/2024)    PRAPARE - Transportation     Lack of Transportation (Medical): Yes     Lack of Transportation (Non-Medical): No   Physical Activity: Inactive (3/19/2024)    Exercise Vital Sign     Days of Exercise per Week: 0 days     Minutes of Exercise per Session: 0 min   Stress: Stress Concern Present (3/19/2024)    [employer] [place] [employer] - Occupational Stress Questionnaire     Feeling of Stress : To some extent   Housing Stability: High Risk (3/19/2024)    Housing Stability Vital Sign     Unable to Pay for Housing in the Last Year: No     Number of Places Lived in the Last Year: 4     Unstable Housing in the Last Year: No     Medication List with Changes/Refills   Current Medications    ALBUTEROL (ACCUNEB) 1.25 MG/3 ML NEBU    Take 3 mLs (1.25 mg total) by nebulization every 6 (six) hours as needed (cough, shortness of breath). Rescue    ALBUTEROL (PROVENTIL/VENTOLIN HFA) 90 MCG/ACTUATION INHALER    Inhale 2 puffs into the lungs every 4 (four) hours as needed.    APIXABAN (ELIQUIS) 2.5 MG TAB    2.5 mg.    ATORVASTATIN (LIPITOR) 20 MG TABLET    20 mg.    BUMETANIDE (BUMEX) 1 MG TABLET    Take two tablet early in the  morning and one right after lunch.    CARVEDILOL (COREG) 3.125 MG TABLET    3.125 mg.    CITALOPRAM (CELEXA) 20 MG TABLET    20 mg.    DOXAZOSIN (CARDURA) 8 MG TAB    Take 8 mg by mouth once daily.    DUTASTERIDE (AVODART) 0.5 MG CAPSULE    Take 1 capsule by mouth once daily.    EMPAGLIFLOZIN (JARDIANCE) 10 MG TABLET    Take 1 tablet (10 mg total) by mouth once daily.    FLUOCINONIDE 0.05% (LIDEX) 0.05 % CREAM    APPLY SMALL AMOUNT TOPICALLY TWICE A DAY AS NEEDED ITCHING    LEVOTHYROXINE (SYNTHROID) 200 MCG TABLET    Take 1 tablet by mouth once daily.    POTASSIUM CHLORIDE (MICRO-K) 10 MEQ CPSR    Take 1 capsule (10 mEq total) by mouth once daily.    PREGABALIN (LYRICA) 75 MG CAPSULE    Take 1 capsule (75 mg total) by mouth 2 (two) times daily.    ROPINIROLE (REQUIP) 0.25 MG TABLET    0.25 mg.    SEMAGLUTIDE (OZEMPIC) 1 MG/DOSE (4 MG/3 ML)    ADMINISTER 1 MG UNDER THE SKIN EVERY WEEK FOR DIABETES    TIOTROPIUM (SPIRIVA) 18 MCG INHALATION CAPSULE    INHALE 1 CAP BY MOUTH EVERY DAY    TRAMADOL (ULTRAM) 50 MG TABLET    Take 1 tablet (50 mg total) by mouth every 8 (eight) hours as needed for Pain.    TRIAMCINOLONE ACETONIDE 0.1% (KENALOG) 0.1 % CREAM    APPLY SMALL AMOUNT TOPICALLY TWICE A DAY AS NEEDED FOR ITCHING    VITAMIN D (VITAMIN D3) 1000 UNITS TAB    25 mcg.     Review of patient's allergies indicates:   Allergen Reactions    Iodinated contrast media Anaphylaxis    Iodine Anaphylaxis     Breathing issue    Lobster Anaphylaxis       Physical Exam:   Body mass index is 29.83 kg/m².    GENERAL: Well appearing, in no acute distress.  HEAD: Normocephalic and atraumatic.  ENT: External ears and nose grossly normal.  EYES: EOMI bilaterally  PULMONARY: Respirations are grossly even and non-labored.  NEURO: Awake, alert, and oriented x 3.  SKIN: No obvious rashes appreciated.  PSYCH: Mood & affect are appropriate.    Detailed MSK exam:     Left knee exam:   -ROM: extension +10, flexion 110  -TTP: Medial joint line and  Lateral joint line  -effusion: present  -Patellar apprehension negative  -Jayme test negative  -stable to varus and valgus stress tests  -Lachman test difficult to assess, anterior drawer test difficult to assess, posterior drawer test difficult to assess    Right knee exam:   -ROM: extension +10, flexion 110  -TTP: Medial joint line and Lateral joint line  -effusion: present  -Patellar apprehension negative  -Jayme test positive -  medial  -stable to varus and valgus stress tests  -Lachman test difficult to assess, anterior drawer test difficult to assess, posterior drawer test difficult to assess      Imaging:  US Lower Extremity Veins Bilateral  Narrative: EXAM: US LOWER EXTREMITY VEINS BILATERAL    CLINICAL HISTORY:  Edema    COMPARISON:  No relevant prior studies.    TECHNIQUE: Real-time duplex sonography of the deep veins of the bilateral lower extremities with color and spectral Doppler, with and without compression.    RIGHT: There is normal compressibility of the common femoral, femoral, popliteal, and portions of the calf veins.  There is normal spontaneous and phasic variation throughout the leg by spectral Doppler.    LEFT: There is normal compressibility of the common femoral, femoral, popliteal, and portions of the calf veins.  There is normal spontaneous and phasic variation throughout the leg by spectral Doppler  Impression: Negative for deep venous thrombosis.    Finalized on: 4/30/2024 11:32 AM By:  Juvencio Gonzalez MD  [facility]# [id number]      2024-04-30 11:34:56.197    [facility]        Relevant imaging results were reviewed and interpreted by me and per my read shows mild to moderate arthritic changes bilateral knees.  This was discussed with the patient and / or family today.     Assessment:  Zach Lund is a 89 y.o. male presenting with bilateral knee pain.   History, physical and radiographs are consistent with a likely diagnosis of OA, possible MMT vs cartilage damage.   Plan: MRI ordered. Prior  auth for gel injections. Continue home health PT to work on deconditioning and minimizing fall risk with long term goal of being able to ambulate short distances again safely. Continue conservative management for pain.   Follow up for gel injections. All questions answered.      Primary osteoarthritis of both knees  -     MRI Knee Without Contrast Right; Future; Expected date: 05/06/2024  -     MRI Knee Without Contrast Left; Future; Expected date: 05/06/2024  -     Prior authorization Order    Acute pain of both knees  -     Ambulatory referral/consult to Orthopedics    Physical deconditioning  -     MRI Knee Without Contrast Right; Future; Expected date: 05/06/2024  -     MRI Knee Without Contrast Left; Future; Expected date: 05/06/2024  -     Prior authorization Order         Today's visit is associated with current or anticipated ongoing medical care related to this patient's diagnosis of osteoarthritis.  Currently there is no cure of osteoarthritis and the patient will require regular follow up to manage symptoms and progression.  The patient is to return to the office within a minimum of 3-6 months to review symptoms and function at that time.   CPT code      MEDICAL NECESSITY FOR VISCOSUPPLEMENTATION: After thorough evaluation of the patient, I have determined that visco-supplementation is medically necessary. The patient has painful degenerative changes of the knee with failure of conservative treatments including lifestyle modifications and rehabilitation exercises.  Oral analgesis/NSAIDs have not adequately controlled symptoms and there is radiographic evidence of Kellgren Eduard grade 2 or greater osteoarthritic changes, or in lack of radiographic evidence, there is arthroscopic or other evidence of chondrosis.     I spent a total of 60 minutes on the day of the visit.  This includes face to face time and non-face to face time preparing to see the patient (eg, review of tests), obtaining and/or  reviewing separately obtained history, documenting clinical information in the electronic or other health record, independently interpreting results and communicating results to the patient/family/caregiver, or care coordinator.        A copy of today's visit note has been sent to the referring provider.     Electronically signed:  Wagner Hein MD, MPH  05/06/2024  10:30 AM

## 2024-05-06 NOTE — TELEPHONE ENCOUNTER
Spoke with pt's son to let him know that pt's MRI has been authorized and orders have been faxed to [place]. Asked that pt let our office know when MRI is scheduled so that our office can schedule a follow up appt to review results with pt.  Son verbalized understanding of conversation and what is needed

## 2024-05-07 ENCOUNTER — PATIENT MESSAGE (OUTPATIENT)
Dept: ORTHOPEDICS | Facility: CLINIC | Age: 89
End: 2024-05-07
Payer: OTHER GOVERNMENT

## 2024-05-07 DIAGNOSIS — M17.0 BILATERAL PRIMARY OSTEOARTHRITIS OF KNEE: Primary | ICD-10-CM

## 2024-05-14 ENCOUNTER — HOSPITAL ENCOUNTER (OUTPATIENT)
Dept: RADIOLOGY | Facility: HOSPITAL | Age: 89
Discharge: HOME | End: 2024-05-14
Attending: STUDENT IN AN ORGANIZED HEALTH CARE EDUCATION/TRAINING PROGRAM
Payer: MEDICARE

## 2024-05-14 DIAGNOSIS — M17.0 BILATERAL PRIMARY OSTEOARTHRITIS OF KNEE: ICD-10-CM

## 2024-05-14 PROCEDURE — 73721 MRI JNT OF LWR EXTRE W/O DYE: CPT | Mod: 26,LT,, | Performed by: RADIOLOGY

## 2024-05-14 PROCEDURE — 73721 MRI JNT OF LWR EXTRE W/O DYE: CPT | Mod: 26,RT,, | Performed by: RADIOLOGY

## 2024-05-14 PROCEDURE — 73721 MRI JNT OF LWR EXTRE W/O DYE: CPT | Mod: TC,RT

## 2024-05-14 PROCEDURE — 73721 MRI JNT OF LWR EXTRE W/O DYE: CPT | Mod: TC,LT

## 2024-05-17 ENCOUNTER — OFFICE VISIT (OUTPATIENT)
Dept: ORTHOPEDICS | Facility: CLINIC | Age: 89
End: 2024-05-17
Payer: MEDICARE

## 2024-05-17 ENCOUNTER — PATIENT MESSAGE (OUTPATIENT)
Dept: SPORTS MEDICINE | Facility: CLINIC | Age: 89
End: 2024-05-17
Payer: OTHER GOVERNMENT

## 2024-05-17 VITALS — BODY MASS INDEX: 29.2 KG/M2 | HEIGHT: 70 IN | WEIGHT: 204 LBS

## 2024-05-17 DIAGNOSIS — S83.241D TEAR OF MEDIAL MENISCUS OF RIGHT KNEE, CURRENT, UNSPECIFIED TEAR TYPE, SUBSEQUENT ENCOUNTER: ICD-10-CM

## 2024-05-17 DIAGNOSIS — S82.142A CLOSED FRACTURE OF LEFT TIBIAL PLATEAU, INITIAL ENCOUNTER: ICD-10-CM

## 2024-05-17 DIAGNOSIS — S83.282D TEAR OF LATERAL MENISCUS OF LEFT KNEE, CURRENT, UNSPECIFIED TEAR TYPE, SUBSEQUENT ENCOUNTER: ICD-10-CM

## 2024-05-17 DIAGNOSIS — M71.21 SYNOVIAL CYST OF RIGHT POPLITEAL SPACE: ICD-10-CM

## 2024-05-17 DIAGNOSIS — M17.0 PRIMARY OSTEOARTHRITIS OF BOTH KNEES: ICD-10-CM

## 2024-05-17 DIAGNOSIS — M17.11 PRIMARY OSTEOARTHRITIS OF RIGHT KNEE: Primary | ICD-10-CM

## 2024-05-17 PROCEDURE — 99999PBSHW PR PBB SHADOW TECHNICAL ONLY FILED TO HB: Mod: JZ,PBBFAC,,

## 2024-05-17 PROCEDURE — 99214 OFFICE O/P EST MOD 30 MIN: CPT | Mod: PBBFAC,PO | Performed by: STUDENT IN AN ORGANIZED HEALTH CARE EDUCATION/TRAINING PROGRAM

## 2024-05-17 PROCEDURE — 99215 OFFICE O/P EST HI 40 MIN: CPT | Mod: 25,S$PBB,57, | Performed by: STUDENT IN AN ORGANIZED HEALTH CARE EDUCATION/TRAINING PROGRAM

## 2024-05-17 PROCEDURE — 99999 PR PBB SHADOW E&M-EST. PATIENT-LVL IV: CPT | Mod: PBBFAC,,, | Performed by: STUDENT IN AN ORGANIZED HEALTH CARE EDUCATION/TRAINING PROGRAM

## 2024-05-17 PROCEDURE — 20611 DRAIN/INJ JOINT/BURSA W/US: CPT | Mod: PBBFAC,PO | Performed by: STUDENT IN AN ORGANIZED HEALTH CARE EDUCATION/TRAINING PROGRAM

## 2024-05-17 PROCEDURE — 27530 TREAT KNEE FRACTURE: CPT | Mod: S$PBB,LT,, | Performed by: STUDENT IN AN ORGANIZED HEALTH CARE EDUCATION/TRAINING PROGRAM

## 2024-05-17 PROCEDURE — 27530 TREAT KNEE FRACTURE: CPT | Mod: PBBFAC,PO,LT | Performed by: STUDENT IN AN ORGANIZED HEALTH CARE EDUCATION/TRAINING PROGRAM

## 2024-05-17 RX ADMIN — Medication 4 ML: at 01:05

## 2024-05-17 NOTE — PATIENT INSTRUCTIONS
Assessment:  Zach Lund is a 89 y.o. male   Chief Complaint   Patient presents with    Left Knee - Pain    Right Knee - Pain       No diagnosis found.     Plan:  Reviewed MRI results with patient  Patient to be non weight bearing for the next 6 weeks  Ultrasound guided Monovisc gel injection to the right knee today  Today you received a gel injection. Unlike steroid injections, these gel injections are a lot thicker and can feel different at first.   It is normal to feel some soreness in the first few days because the gel is expanding that joint space.   It is also normal to experience some swelling in the first few days.   If you are feeling discomfort or having swelling, use ice and tylenol to control symptoms.   It is better to keep the knee joint moving so that the gel can get throughout the joint space.   Sometimes it can take a few weeks for the gel injection to start showing noticeable effects. This is normal.   These gel injections can be repeated every 6 months as needed.   Follow up in 6 weeks     Follow-up: 6 weeks or sooner if there are problems between now and then.    Thank you for choosing [facility] and Dr. Wagner Hein for your orthopedic & sports medicine care. It is our goal to provide you with exceptional care that will help keep you healthy, active, and get you back in the game.    Please do not hesitate to reach out to us via email, phone, or MyChart with any questions, concerns, or feedback.    If you felt that you received exemplary care today, please consider leaving us feedback on [employer] at:  [email or web address]    If you are experiencing pain/discomfort ,or have questions after 5pm and would like to be connected to the [facility] on-call team, please call this number and specify which Sports Medicine provider is treating you: [phone]

## 2024-05-17 NOTE — PROGRESS NOTES
Patient ID: Zach Lund  YOB: 1933  MRN: [id number]    Chief Complaint: Pain of the Left Knee and Pain of the Right Knee      Referred By: Bao Mcarthur MD for bilateral leg/knee pain    History of Present Illness: Zach Lund is a right-hand dominant 89 y.o. male who presents today for bilateral knee MRI review.  Last seen in clinic on 5/6/2024.  Reports current pain in right knee at an 8/10 and in left knee a 6/10.  Patient states that PT came to his house this morning for initial evaluation and believes it went well.     5/6/2024 Interval History of Present Illness: Zach Lund is a right-hand dominant 89 y.o. male who presents today with bilateral knee/ leg pain after a fall. He has a wound on his left lower leg. He is accompanied by his son.  The patient experienced a fall approximately 4 weeks ago. He was exiting the bathroom, reaching over to grab his headrest from the wheelchair. After a brief rest period, he took a backward step, resulting in a loss of control of his legs and a twisting motion. He fell backwards, but his knees became caught in his chair, leading to a twisting motion. He reports bilateral ankle pain radiating from the ankle to the groin, which intensifies upon standing, preventing him from taking a forward step, necessitating sideways steps. His knees are particularly painful when he attempts to stretch them, but not at rest. His mobility is limited due to knee pain, preventing him from walking, standing, and getting in and out of bed. He is not currently receiving home health care. His pain management regimen includes tramadol, which he uses sparingly,    The patient is active in none.  Occupation: retired      Past Medical History:   History reviewed. No pertinent past medical history.  Past Surgical History:   Procedure Laterality Date    CHOLECYSTECTOMY  2012    EYE SURGERY  2011    cataracts    PROSTATE SURGERY  2017    Urolift    TONSILLECTOMY        Family History   Problem Relation Name Age of Onset    Arthritis Mother Jacque     Mental illness Father Brandon     Cancer Brother Don      Social History     Socioeconomic History    Marital status:    Tobacco Use    Smoking status: Former     Current packs/day: 0.00     Average packs/day: 0.1 packs/day for 45.0 years (2.3 ttl pk-yrs)     Types: Cigarettes     Start date: 1955     Quit date: 2000     Years since quittin.3    Tobacco comments:     never a regular smoker.  would only have one in social situations   Substance and Sexual Activity    Drug use: Never    Sexual activity: Not Currently     Partners: Female     Birth control/protection: Other-see comments     Comment: My stuff doesnt work anymore     Social Determinants of Health     Financial Resource Strain: Medium Risk (3/19/2024)    Overall Financial Resource Strain (CARDIA)     Difficulty of Paying Living Expenses: Somewhat hard   Food Insecurity: Food Insecurity Present (3/19/2024)    Hunger Vital Sign     Worried About Running Out of Food in the Last Year: Sometimes true     Ran Out of Food in the Last Year: Never true   Transportation Needs: Unmet Transportation Needs (3/19/2024)    PRAPARE - Transportation     Lack of Transportation (Medical): Yes     Lack of Transportation (Non-Medical): No   Physical Activity: Inactive (3/19/2024)    Exercise Vital Sign     Days of Exercise per Week: 0 days     Minutes of Exercise per Session: 0 min   Stress: Stress Concern Present (3/19/2024)    [employer] - Occupational Stress Questionnaire     Feeling of Stress : To some extent   Housing Stability: High Risk (3/19/2024)    Housing Stability Vital Sign     Unable to Pay for Housing in the Last Year: No     Number of Places Lived in the Last Year: 4     Unstable Housing in the Last Year: No     Medication List with Changes/Refills   Current Medications    ALBUTEROL (ACCUNEB) 1.25 MG/3 ML NEBU    Take 3 mLs (1.25  mg total) by nebulization every 6 (six) hours as needed (cough, shortness of breath). Rescue    ALBUTEROL (PROVENTIL/VENTOLIN HFA) 90 MCG/ACTUATION INHALER    Inhale 2 puffs into the lungs every 4 (four) hours as needed.    APIXABAN (ELIQUIS) 2.5 MG TAB    2.5 mg.    ATORVASTATIN (LIPITOR) 20 MG TABLET    20 mg.    BUMETANIDE (BUMEX) 1 MG TABLET    Take two tablet early in the morning and one right after lunch.    CARVEDILOL (COREG) 3.125 MG TABLET    3.125 mg.    CITALOPRAM (CELEXA) 20 MG TABLET    20 mg.    DOXAZOSIN (CARDURA) 8 MG TAB    Take 8 mg by mouth once daily.    DUTASTERIDE (AVODART) 0.5 MG CAPSULE    Take 1 capsule by mouth once daily.    EMPAGLIFLOZIN (JARDIANCE) 10 MG TABLET    Take 1 tablet (10 mg total) by mouth once daily.    FLUOCINONIDE 0.05% (LIDEX) 0.05 % CREAM    APPLY SMALL AMOUNT TOPICALLY TWICE A DAY AS NEEDED ITCHING    LEVOTHYROXINE (SYNTHROID) 200 MCG TABLET    Take 1 tablet by mouth once daily.    POTASSIUM CHLORIDE (MICRO-K) 10 MEQ CPSR    Take 1 capsule (10 mEq total) by mouth once daily.    PREGABALIN (LYRICA) 75 MG CAPSULE    Take 1 capsule (75 mg total) by mouth 2 (two) times daily.    ROPINIROLE (REQUIP) 0.25 MG TABLET    0.25 mg.    SEMAGLUTIDE (OZEMPIC) 1 MG/DOSE (4 MG/3 ML)    ADMINISTER 1 MG UNDER THE SKIN EVERY WEEK FOR DIABETES    TIOTROPIUM (SPIRIVA) 18 MCG INHALATION CAPSULE    INHALE 1 CAP BY MOUTH EVERY DAY    TRAMADOL (ULTRAM) 50 MG TABLET    Take 1 tablet (50 mg total) by mouth every 8 (eight) hours as needed for Pain.    TRIAMCINOLONE ACETONIDE 0.1% (KENALOG) 0.1 % CREAM    APPLY SMALL AMOUNT TOPICALLY TWICE A DAY AS NEEDED FOR ITCHING    VITAMIN D (VITAMIN D3) 1000 UNITS TAB    25 mcg.     Review of patient's allergies indicates:   Allergen Reactions    Iodinated contrast media Anaphylaxis    Iodine Anaphylaxis     Breathing issue    Lobster Anaphylaxis       Physical Exam:   Body mass index is 29.27 kg/m².    GENERAL: Well appearing, in no acute distress.  HEAD:  Normocephalic and atraumatic.  ENT: External ears and nose grossly normal.  EYES: EOMI bilaterally  PULMONARY: Respirations are grossly even and non-labored.  NEURO: Awake, alert, and oriented x 3.  SKIN: No obvious rashes appreciated.  PSYCH: Mood & affect are appropriate.    Detailed MSK exam:     Left knee exam:   -ROM: extension +10, flexion 110  -TTP: Medial joint line and Lateral joint line  -effusion: present  -Patellar apprehension negative  -Jayme test negative  -stable to varus and valgus stress tests  -Lachman test difficult to assess, anterior drawer test difficult to assess, posterior drawer test difficult to assess    Right knee exam:   -ROM: extension +10, flexion 110  -TTP: Medial joint line and Lateral joint line  -effusion: present  -Patellar apprehension negative  -Jayme test positive -  medial  -stable to varus and valgus stress tests  -Lachman test difficult to assess, anterior drawer test difficult to assess, posterior drawer test difficult to assess      Imaging:  MRI Knee Without Contrast Right  Narrative: EXAM:  MRI KNEE WITHOUT CONTRAST RIGHT    CLINICAL HISTORY: Right knee pain. Knee pain, chronic, degenerative disease on xray (Age >= 5y); [M17.0]-Bilateral primary osteoarthritis of knee.    TECHNIQUE: Standard multiplanar, multisequence MR imaging protocol of the right knee was performed.    FINDINGS:  MENISCI:  Medial: Nondisplaced vertical oblique free edge flap tear and peripheral horizontal undersurface tear posterior horn and posterior junctional zone.  Small low-grade free edge tear posterior root.  Increased intrasubstance signal of the body without obvious tear.    Lateral: No obvious tear.  Mild increased intrasubstance signal of the body.    CRUCIATE LIGAMENTS: Intact.    COLLATERAL LIGAMENTS: Intact.    Extensor mechanism, patellar retinaculum/MPFL: Intact.    Remaining major supporting soft tissue structures: No acute findings.    BONES and JOINTS: Joint alignment is  within normal limits.  Severe patellofemoral osteoarthritis with extensive full-thickness chondral loss , subchondral cystic change, subchondral reactive marrow edema and marginal osteophytes.  Mild to moderate medial compartment osteoarthritis with generalized chondral thinning, small partial-thickness chondral defects and marginal osteophytes.  Small full-thickness and partial-thickness chondral defects of the lateral compartment weightbearing surfaces with marginal osteophytes and mild subchondral reactive marrow edema.  No fracture or unstable osteochondral lesion identified.  1.9 cm loose intra-articular osteochondral body versus elongated osteophyte of the posterior medial femoral condyle.  Subcentimeter loose intra-articular osteochondral body in the posterior joint recess of the medial compartment.  Small joint effusion.  Small Baker's cyst.  Circumferential subcutaneous edema.  Small joint effusion.  Bilateral circumferential subcutaneous edema.  Impression: 1.  Medial meniscus tears, as above.  2.  Tricompartmental osteoarthritis most severe in the patellofemoral compartment with reactive marrow edema.  3.  1.9 cm loose intra-articular osteochondral body versus elongated osteophyte of the posterior medial femoral condyle.  Subcentimeter loose intra-articular osteochondral body in the posterior joint recess of the medial compartment.  4.  Small Baker's cyst.  Bilateral circumferential subcutaneous edema.    Finalized on: 5/15/2024 8:18 AM By:  Barrington Beverly MD  [facility]# [id number]      2024-05-15 08:20:[id number]    [facility]  MRI Knee Without Contrast Left  Narrative: EXAM:  MRI KNEE WITHOUT CONTRAST LEFT    CLINICAL HISTORY: 89 year-old male with bilateral knee pain. Knee pain, chronic, degenerative disease on xray (Age >= 5y); [M17.0]-Bilateral primary osteoarthritis of knee.    TECHNIQUE: Standard multiplanar, multisequence MR imaging protocol of the left knee was performed.    FINDINGS: Patient motion degrades image  quality.  MENISCI:  Medial: Mild degenerative intrasubstance signal.  No obvious tear.    Lateral: Possible horizontal undersurface tear of the posterior root versus patient motion artifact.  Increased intrasubstance signal and body without additional tear identified.    CRUCIATE LIGAMENTS: Intact.    COLLATERAL LIGAMENTS: Intact.    Extensor mechanism, patellar retinaculum/MPFL: Intact.    Remaining major supporting soft tissue structures: Intact.    BONES and JOINTS: Nondisplaced curvilinear osteochondral fracture posterior medial tibial plateau with surrounding marrow edema.  Nondisplaced oblique fracture of the fibular neck with surrounding marrow edema.  Marrow edema of the medial femoral condyle and lateral patella without additional fracture identified.  Joint alignment is within normal limits.  Severe patellofemoral osteoarthritis with extensive full-thickness chondral loss and marginal osteophytes.  Moderate medial compartment osteoarthritis with generalized chondral thinning, small full-thickness chondral defects and marginal osteophytes.  Mild lateral compartment osteoarthritis with chondral thinning and marginal osteophytes.  Small joint effusion.  Bilateral circumferential subcutaneous edema.  Impression: 1.  Nondisplaced fractures and marrow edema of the medial tibial plateau and fibular neck. Marrow edema of the medial femoral condyle and lateral patella without additional fracture identified.  2.  Tricompartmental osteoarthritis, most severe in the patellofemoral compartment.  3.  Bilateral circumferential subcutaneous edema.  4.  Degenerative intrasubstance signal of the menisci.  Possible horizontal undersurface tear lateral meniscus posterior root.    Finalized on: 5/15/2024 8:00 AM By:  Barrington Beverly MD  [facility]# [id number]      2024-05-15 08:02:14.Griselda HUTCHISON        Relevant imaging results were reviewed and interpreted by me and per my read shows mild to moderate arthritic changes bilateral knees.   This was discussed with the patient and / or family today.     Assessment:  Zach Lund is a 89 y.o. male following up for bilateral knee pain.   MRI left knee showed nondisplaced medial tibial plateau fracture.    Plan: Monovisc injection right knee today. Advised NWB as much as possible for left knee fracture. Discussed ideal treatment with knee brace locked in extension initially. Patient's son states that won't be possible at this time and he needs to transfer doing daily tasks multiple times a day. Discussed risks related to the current fracture with continuing regular activities instead of NWB. They understand the risks discussed and plan to be as cautious as possible with any weightbearing activties. Offered knee brace but son also said that wouldn't be feasible at this time and has deferred for now. Continue conservative management for pain.   Follow up for gel injections. All questions answered.      Primary osteoarthritis of right knee  -     Sports Medicine US - Guidance for Needle Placement  -     Large Joint Aspiration/Injection: R knee    Primary osteoarthritis of both knees    Tear of medial meniscus of right knee, current, unspecified tear type, subsequent encounter    Tear of lateral meniscus of left knee, current, unspecified tear type, subsequent encounter    Synovial cyst of right popliteal space    Closed fracture of left tibial plateau, initial encounter           Ultrasound guidance was used for needle localization. Images were saved and stored for documentation. The appropriate structures were visualized. Dynamic visualization of the needle was continuous throughout the procedures and maintained good position.     MEDICAL NECESSITY FOR VISCOSUPPLEMENTATION: After thorough evaluation of the patient, I have determined that visco-supplementation is medically necessary. The patient has painful degenerative changes of the knee with failure of conservative treatments including lifestyle  modifications and rehabilitation exercises.  Oral analgesis/NSAIDs have not adequately controlled symptoms and there is radiographic evidence of Kellgren Eduard grade 2 or greater osteoarthritic changes, or in lack of radiographic evidence, there is arthroscopic or other evidence of chondrosis.     I spent a total of 40 minutes on the day of the visit.  This includes face to face time and non-face to face time preparing to see the patient (eg, review of tests), obtaining and/or reviewing separately obtained history, documenting clinical information in the electronic or other health record, independently interpreting results and communicating results to the patient/family/caregiver, or care coordinator.        A copy of today's visit note has been sent to the referring provider.     Electronically signed:  Wagner Hein MD, MPH  05/17/2024  10:30 AM

## 2024-05-17 NOTE — PROCEDURES
Sports Medicine US - Guidance for Needle Placement    Date/Time: 5/17/2024 1:40 PM    Performed by: Wagner Hein MD  Authorized by: Wagner Hein MD  Preparation: Patient was prepped and draped in the usual sterile fashion.  Local anesthesia used: no    Anesthesia:  Local anesthesia used: no    Sedation:  Patient sedated: no    Patient tolerance: patient tolerated the procedure well with no immediate complications  Comments: Ultrasound guidance was used for needle localization. Images were saved and stored for documentation. The appropriate structures were visualized. Dynamic visualization of the needle was continuous throughout the procedures and maintained good position.

## 2024-05-17 NOTE — PROCEDURES
Large Joint Aspiration/Injection: R knee    Date/Time: 5/17/2024 1:40 PM    Performed by: Wagner Hein MD  Authorized by: Wagner Hein MD    Consent Done?:  Yes (Verbal)  Indications:  Arthritis and pain  Site marked: the procedure site was marked    Timeout: prior to procedure the correct patient, procedure, and site was verified    Prep: patient was prepped and draped in usual sterile fashion    Local anesthetic:  Lidocaine 1% without epinephrine  Anesthetic total (ml):  2      Details:  Needle Size:  21 G  Ultrasonic Guidance for needle placement?: Yes    Images are saved and documented.  Approach:  Lateral (superior)  Location:  Knee  Site:  R knee  Medications:  4 mL hyaluronate sodium, stabilized 88 mg/4 mL  Patient tolerance:  Patient tolerated the procedure well with no immediate complications     Ultrasound guidance was used for needle localization. Images were saved and stored for documentation. The appropriate structures were visualized. Dynamic visualization of the needle was continuous throughout the procedures and maintained good position.

## 2024-05-18 ENCOUNTER — PATIENT MESSAGE (OUTPATIENT)
Dept: FAMILY MEDICINE | Facility: CLINIC | Age: 89
End: 2024-05-18
Payer: OTHER GOVERNMENT

## 2024-05-23 ENCOUNTER — OFFICE VISIT (OUTPATIENT)
Dept: DIABETES | Facility: CLINIC | Age: 89
End: 2024-05-23
Payer: MEDICARE

## 2024-05-23 ENCOUNTER — TELEPHONE (OUTPATIENT)
Dept: DIABETES | Facility: CLINIC | Age: 89
End: 2024-05-23
Payer: OTHER GOVERNMENT

## 2024-05-23 ENCOUNTER — LAB VISIT (OUTPATIENT)
Dept: LAB | Facility: HOSPITAL | Age: 89
End: 2024-05-23
Attending: NURSE PRACTITIONER
Payer: MEDICARE

## 2024-05-23 VITALS
SYSTOLIC BLOOD PRESSURE: 150 MMHG | HEART RATE: 58 BPM | DIASTOLIC BLOOD PRESSURE: 68 MMHG | HEIGHT: 70 IN | WEIGHT: 206 LBS | BODY MASS INDEX: 29.49 KG/M2

## 2024-05-23 DIAGNOSIS — E11.22 TYPE 2 DIABETES MELLITUS WITH STAGE 3 CHRONIC KIDNEY DISEASE, WITH LONG-TERM CURRENT USE OF INSULIN, UNSPECIFIED WHETHER STAGE 3A OR 3B CKD: ICD-10-CM

## 2024-05-23 DIAGNOSIS — Z79.4 TYPE 2 DIABETES MELLITUS WITH STAGE 3 CHRONIC KIDNEY DISEASE, WITH LONG-TERM CURRENT USE OF INSULIN, UNSPECIFIED WHETHER STAGE 3A OR 3B CKD: Primary | ICD-10-CM

## 2024-05-23 DIAGNOSIS — Z79.4 TYPE 2 DIABETES MELLITUS WITH STAGE 3 CHRONIC KIDNEY DISEASE, WITH LONG-TERM CURRENT USE OF INSULIN, UNSPECIFIED WHETHER STAGE 3A OR 3B CKD: ICD-10-CM

## 2024-05-23 DIAGNOSIS — N18.30 TYPE 2 DIABETES MELLITUS WITH STAGE 3 CHRONIC KIDNEY DISEASE, WITH LONG-TERM CURRENT USE OF INSULIN, UNSPECIFIED WHETHER STAGE 3A OR 3B CKD: Primary | ICD-10-CM

## 2024-05-23 DIAGNOSIS — E11.22 TYPE 2 DIABETES MELLITUS WITH STAGE 3 CHRONIC KIDNEY DISEASE, WITH LONG-TERM CURRENT USE OF INSULIN, UNSPECIFIED WHETHER STAGE 3A OR 3B CKD: Primary | ICD-10-CM

## 2024-05-23 DIAGNOSIS — N18.30 TYPE 2 DIABETES MELLITUS WITH STAGE 3 CHRONIC KIDNEY DISEASE, WITH LONG-TERM CURRENT USE OF INSULIN, UNSPECIFIED WHETHER STAGE 3A OR 3B CKD: ICD-10-CM

## 2024-05-23 LAB
ANION GAP SERPL CALC-SCNC: 5 MMOL/L (ref 8–16)
BUN SERPL-MCNC: 34 MG/DL (ref 8–23)
CALCIUM SERPL-MCNC: 8.2 MG/DL (ref 8.7–10.5)
CHLORIDE SERPL-SCNC: 116 MMOL/L (ref 95–110)
CO2 SERPL-SCNC: 22 MMOL/L (ref 23–29)
CREAT SERPL-MCNC: 2.3 MG/DL (ref 0.5–1.4)
EST. GFR  (NO RACE VARIABLE): 26.3 ML/MIN/1.73 M^2
ESTIMATED AVG GLUCOSE: 148 MG/DL (ref 68–131)
GLUCOSE SERPL-MCNC: 108 MG/DL (ref 70–110)
GLUCOSE SERPL-MCNC: 99 MG/DL (ref 70–110)
HBA1C MFR BLD: 6.8 % (ref 4–5.6)
POTASSIUM SERPL-SCNC: 4.8 MMOL/L (ref 3.5–5.1)
SODIUM SERPL-SCNC: 143 MMOL/L (ref 136–145)

## 2024-05-23 PROCEDURE — 36415 COLL VENOUS BLD VENIPUNCTURE: CPT | Mod: PO | Performed by: NURSE PRACTITIONER

## 2024-05-23 PROCEDURE — 82962 GLUCOSE BLOOD TEST: CPT | Mod: PBBFAC,PO | Performed by: NURSE PRACTITIONER

## 2024-05-23 PROCEDURE — 83036 HEMOGLOBIN GLYCOSYLATED A1C: CPT | Performed by: NURSE PRACTITIONER

## 2024-05-23 PROCEDURE — 80048 BASIC METABOLIC PNL TOTAL CA: CPT | Performed by: NURSE PRACTITIONER

## 2024-05-23 PROCEDURE — 99214 OFFICE O/P EST MOD 30 MIN: CPT | Mod: S$PBB,ICN,, | Performed by: NURSE PRACTITIONER

## 2024-05-23 PROCEDURE — 99215 OFFICE O/P EST HI 40 MIN: CPT | Mod: PBBFAC,PO | Performed by: NURSE PRACTITIONER

## 2024-05-23 PROCEDURE — 99999PBSHW POCT GLUCOSE, HAND-HELD DEVICE: Mod: PBBFAC,,,

## 2024-05-23 PROCEDURE — 99999 PR PBB SHADOW E&M-EST. PATIENT-LVL V: CPT | Mod: PBBFAC,,, | Performed by: NURSE PRACTITIONER

## 2024-05-23 NOTE — PATIENT INSTRUCTIONS
PATIENT INSTRUCTIONS    Labs today.     Increase Jardiance to 25 mg by mouth daily.   Continue Ozempic 1 mg subcutaneously every Friday.   HOLD Lantus 5 units subcutaneously daily.   Continue Dexcom G7 CGM  Blood Sugar Goals:       Fastin-180.       1-2 hours after a meal: Less than 200.     GVOKE Zo ordered for severe hypoglycemia emergencies - patient instructions provided with AVS for use and administration.  Use this link to watch instructional video on GVOKE - [email or web address]

## 2024-05-23 NOTE — PROGRESS NOTES
Zach Lund is a 89 y.o. 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     CHIEF COMPLAINT: Diabetes Consultation    PCP: Bao Mcarthur MD     The patient was initially diagnosed with diabetes in 2017.   Patient from [place], recently moved to [place] with son. He is living at [facility]. Presents today with his son, Hugh, who is very involved in patient's care. Does not have nursing services. Patient is w/c bound currently due to fall in March, fracturing left knee. [facility]. Recently started on Jardiance 1 month ago, was told to stop Ozempic and Lantus. He did stop taking Lantus, but continued his Ozempic once starting the Jardiance 10 mg. Since then his glucoses have been much higher, staying over 200. 2 days ago he restarted taking his lantus due to the high glucoses. There was a low alert this morning of 45, but states it was a false low due to his belt pressing on the sensor.     Previous failed treatments include:  Trulicity - switched to ozempic.   Metformin  Glipizide    Social Documentation:  Patient lives in [place] at [place]  "[facility] home.   Occupation: retired.   Exercise: none    Current monitoring regimen:  G7 CGM    The patient's Dexcom CGM was downloaded and reviewed. For the past 14 days, patient average glucose was 219 mg/dL. He was above range 90% of the time, in range 10% of the time, and below range 0% of the time. The target range for this patient was 70 - 180 mg/dL. Overall, there was a pattern of hyperglycemia.          Current Diabetes related symptoms/problems include hyperglycemia, polydipsia, polyuria, and visual disturbances and have been unchanged.     Diabetes related complications:   nephropathy, retinopathy, and cardiovascular disease.   denies Pancreatitis  denies Gastroparesis  denies DKA  denies Hx/family Hx of MEN2/MTC  denies Frequent UTIs/yeast infections    Cardiovascular Risk Factors: advanced age (>55 for men, >65 for women), dyslipidemia, hypertension, male gender, obesity (BMI >30 kg/m2), and sedentary lifestyle.    Patient currently taking insulin or sulfonylurea?  Yes. Emergency Glucagon Prescription current? no    Any episodes of hypoglycemia? No.   Hypoglycemia Unawareness? No  Severe hypoglycemia requiring 3rd party? No    Last seen by Diabetes Education: none in last year.     Diabetes Medications               empagliflozin (JARDIANCE) 10 mg tablet Take 1 tablet (10 mg total) by mouth once daily. -  DOSE [facility] MG BY [facility], BUT HAS NOT STARTED YET.     semaglutide (OZEMPIC) 1 mg/dose (4 mg/3 mL) ADMINISTER 1 MG UNDER THE SKIN EVERY WEEK FOR DIABETES       Lantus         Restarted at 5 units daily 2 days ago due to hyperglycemia.     DIABETES DISEASE MANAGEMENT STATUS  Statin: Taking  ACE/ARB: Not taking  Screening or Prevention Patient's value Goal Complete/Controlled?   HgA1C Testing and Control   No results found for: "HGBA1C"   Annually/Less than 8% No   Lipid profile : 01/15/2024 Annually No   LDL control No results found for: "LDLCALC" Annually/Less than 100 mg/dl  No   Nephropathy " "screening No results found for: "LABMICR"  No results found for: "PROTEINUA"  No results found for: "UTPCR"   Annually No   Blood pressure BP Readings from Last 1 Encounters:   05/23/24 (!) 150/68    Less than 140/90 No   Dilated retinal exam Most Recent Eye Exam Date: Not Found Annually No   Foot exam   Most Recent Foot Exam Date: Not Found Annually No   Patient's medications, allergies, past medical, surgical, social and family histories were reviewed and updated as appropriate.     Review of Systems   Constitutional:  Negative for weight loss.   Eyes:  Negative for blurred vision and double vision.   Cardiovascular:  Negative for chest pain.   Gastrointestinal:  Negative for nausea and vomiting.   Genitourinary:  Negative for frequency.   Musculoskeletal:  Negative for falls.   Neurological:  Negative for dizziness and weakness.   Endo/Heme/Allergies:  Negative for polydipsia.   Psychiatric/Behavioral:  Negative for depression.    All other systems reviewed and are negative.       Physical Exam  Constitutional:       General: He is not in acute distress.     Appearance: Normal appearance.   Eyes:      Extraocular Movements: Extraocular movements intact.      Pupils: Pupils are equal, round, and reactive to light.   Cardiovascular:      Rate and Rhythm: Normal rate and regular rhythm.      Heart sounds: Normal heart sounds.   Pulmonary:      Effort: Pulmonary effort is normal. No respiratory distress.      Breath sounds: Normal breath sounds.   Musculoskeletal:      Cervical back: Normal range of motion and neck supple.   Neurological:      Mental Status: He is alert and oriented to person, place, and time.   Psychiatric:         Mood and Affect: Mood normal.         Behavior: Behavior normal.          Blood pressure (!) 150/68, pulse (!) 58, height 5' 10" (1.778 m), weight 93.4 kg (206 lb).  Wt Readings from Last 3 Encounters:   05/23/24 93.4 kg (206 lb)   05/17/24 92.5 kg (204 lb)   05/06/24 94.3 kg (207 lb 14.3 " "oz)       LAB REVIEW  Lab Results   Component Value Date     03/22/2024    K 4.7 04/30/2024     03/22/2024    CO2 23 03/22/2024    BUN 35 (H) 03/22/2024    CREATININE 1.9 (H) 03/22/2024    CALCIUM 8.2 (L) 03/22/2024    ANIONGAP 10 03/22/2024    EGFRNORACEVR 33.1 (A) 03/22/2024     No results found for: "CPEPTIDE", "GLUTAMICACID", "INSLNABS"  No results found for: "HGBA1C"    Lab Results   Component Value Date    POCGLU 99 05/23/2024       ASSESSMENT    ICD-10-CM ICD-9-CM   1. Type 2 diabetes mellitus with stage 3 chronic kidney disease, with long-term current use of insulin, unspecified whether stage 3a or 3b CKD  E11.22 250.40    N18.30 585.3    Z79.4 V58.67        PLAN  Diagnoses and all orders for this visit:    Type 2 diabetes mellitus with stage 3 chronic kidney disease, with long-term current use of insulin, unspecified whether stage 3a or 3b CKD  -     Ambulatory referral/consult to Diabetic Advanced Practice Providers (Medical Management)  -     POCT Glucose, Hand-Held Device  -     empagliflozin (JARDIANCE) 25 mg tablet; Take 1 tablet (25 mg total) by mouth once daily.  -     Basic Metabolic Panel; Future  -     Hemoglobin A1C; Future  -     semaglutide (OZEMPIC) 1 mg/dose (4 mg/3 mL); Inject 1 mg into the skin every 7 days.        Reviewed pathophysiology of diabetes, complications related to the disease, importance of annual dilated eye exam and daily foot examination. Explained MOA, SE, dosage of medications. Written instructions given and reviewed with patient and patient verbalizes understanding. Discussed importance of A1c less than 7% to reduce risk of micro and macro complications, including nephropathy, neuropathy, retinopathy, heart attack and stroke. Reviewed the importance of controlled Blood Pressure and Cholesterol Lab Values in preventing disease.     PATIENT INSTRUCTIONS    Labs today.     Increase Jardiance to 25 mg by mouth daily.   Continue Ozempic 1 mg subcutaneously every " Friday.   HOLD Lantus 5 units subcutaneously daily.   Continue Dexcom G7 CGM  Blood Sugar Goals:       Fastin-180.       1-2 hours after a meal: Less than 200.     GVOKE Hypopen ordered for severe hypoglycemia emergencies - patient instructions provided with AVS for use and administration.  Use this link to watch instructional video on GVOKE - [email or web address]/[email or web address]?[email or web address]     Follow up in about 3 weeks (around 2024) for Dexcom, Virtual, Schedule non-fasting labs.

## 2024-05-24 ENCOUNTER — PATIENT MESSAGE (OUTPATIENT)
Dept: DIABETES | Facility: CLINIC | Age: 89
End: 2024-05-24
Payer: OTHER GOVERNMENT

## 2024-05-24 DIAGNOSIS — E11.22 TYPE 2 DIABETES MELLITUS WITH STAGE 3 CHRONIC KIDNEY DISEASE, WITH LONG-TERM CURRENT USE OF INSULIN, UNSPECIFIED WHETHER STAGE 3A OR 3B CKD: Primary | ICD-10-CM

## 2024-05-24 DIAGNOSIS — Z79.4 TYPE 2 DIABETES MELLITUS WITH STAGE 3 CHRONIC KIDNEY DISEASE, WITH LONG-TERM CURRENT USE OF INSULIN, UNSPECIFIED WHETHER STAGE 3A OR 3B CKD: Primary | ICD-10-CM

## 2024-05-24 DIAGNOSIS — N18.30 TYPE 2 DIABETES MELLITUS WITH STAGE 3 CHRONIC KIDNEY DISEASE, WITH LONG-TERM CURRENT USE OF INSULIN, UNSPECIFIED WHETHER STAGE 3A OR 3B CKD: Primary | ICD-10-CM

## 2024-05-24 NOTE — LETTER
May 27, 2024      Attn: Dwain Matias NP  [address] [address]  FAX: [phone]          [facility]  [address] [address]  Phone: [phone]  Fax: [phone]          Patient: Zach Lund   MR Number: [id number]   YOB: 1933   Date of Visit: 5/24/2024     Subject: Request for Community Care of Diabetes Management for Zach Lund    Dear Mr. Florencio NP,    I am writing on behalf of our mutual patient, Zach Lund, who is diagnosed with diabetes. I believe that the patient's current health status would significantly benefit from specialized care and treatment provided by our dedicated diabetes management team at [facility].    Our team of diabetes specialists includes advanced practice providers, dietitians, and diabetes educators who work collaboratively to provide comprehensive treatment plans tailored to the unique needs of each patient. We focus not only on medication management but also on lifestyle modifications, including diet and exercise, which are crucial for successful diabetes management.    By working together, we can provide Mr. Lund with the most effective and efficient care possible. This approach will ensure that the patient receives a seamless continuum of care, with [facility] as [facility], and us as the specialist team. We will keep you informed and involved in all significant decisions and changes in the patients management plan.  I kindly request your permission to provide Community Care for Mr. Lund in our diabetes management program. We assure you that we will work collaboratively with your office, maintaining open communication and ensuring that you are updated on the patient's progress.    Please feel free to contact me directly should you have any questions or require additional information. I look forward to the possibility of working together to provide Mr. Lund with the best  possible care.    Thank you for your time and consideration.  Sincerely,      Kristen Muscarello, FNP [facility]

## 2024-05-24 NOTE — TELEPHONE ENCOUNTER
Genevieve, I had responded to him once already so this is just a follow up to his first message.

## 2024-05-27 ENCOUNTER — EXTERNAL HOME HEALTH (OUTPATIENT)
Dept: HOME HEALTH SERVICES | Facility: HOSPITAL | Age: 89
End: 2024-05-27
Payer: OTHER GOVERNMENT

## 2024-05-28 ENCOUNTER — TELEPHONE (OUTPATIENT)
Dept: DIABETES | Facility: CLINIC | Age: 89
End: 2024-05-28
Payer: OTHER GOVERNMENT

## 2024-06-05 ENCOUNTER — DOCUMENT SCAN (OUTPATIENT)
Dept: HOME HEALTH SERVICES | Facility: HOSPITAL | Age: 89
End: 2024-06-05
Payer: OTHER GOVERNMENT

## 2024-06-10 ENCOUNTER — OFFICE VISIT (OUTPATIENT)
Dept: NEPHROLOGY | Facility: CLINIC | Age: 89
End: 2024-06-10
Payer: MEDICARE

## 2024-06-10 VITALS
WEIGHT: 206 LBS | HEART RATE: 46 BPM | DIASTOLIC BLOOD PRESSURE: 68 MMHG | BODY MASS INDEX: 29.49 KG/M2 | HEIGHT: 70 IN | SYSTOLIC BLOOD PRESSURE: 160 MMHG | OXYGEN SATURATION: 96 %

## 2024-06-10 DIAGNOSIS — Z79.4 TYPE 2 DIABETES MELLITUS WITH STAGE 4 CHRONIC KIDNEY DISEASE, WITH LONG-TERM CURRENT USE OF INSULIN: ICD-10-CM

## 2024-06-10 DIAGNOSIS — N25.81 SECONDARY HYPERPARATHYROIDISM OF RENAL ORIGIN: ICD-10-CM

## 2024-06-10 DIAGNOSIS — E55.9 VITAMIN D DEFICIENCY: ICD-10-CM

## 2024-06-10 DIAGNOSIS — E11.22 TYPE 2 DIABETES MELLITUS WITH STAGE 4 CHRONIC KIDNEY DISEASE, WITH LONG-TERM CURRENT USE OF INSULIN: ICD-10-CM

## 2024-06-10 DIAGNOSIS — E13.3399: Chronic | ICD-10-CM

## 2024-06-10 DIAGNOSIS — I50.32 CHRONIC HEART FAILURE WITH PRESERVED EJECTION FRACTION: ICD-10-CM

## 2024-06-10 DIAGNOSIS — I10 ESSENTIAL HYPERTENSION: ICD-10-CM

## 2024-06-10 DIAGNOSIS — N18.4 TYPE 2 DIABETES MELLITUS WITH STAGE 4 CHRONIC KIDNEY DISEASE, WITH LONG-TERM CURRENT USE OF INSULIN: ICD-10-CM

## 2024-06-10 DIAGNOSIS — N18.4 CKD (CHRONIC KIDNEY DISEASE) STAGE 4, GFR 15-29 ML/MIN: Primary | ICD-10-CM

## 2024-06-10 PROBLEM — H54.7 UNSPECIFIED VISUAL LOSS: Status: ACTIVE | Noted: 2024-04-25

## 2024-06-10 PROBLEM — I87.2 PERIPHERAL VENOUS INSUFFICIENCY: Status: ACTIVE | Noted: 2024-04-25

## 2024-06-10 PROBLEM — I48.20 CHRONIC ATRIAL FIBRILLATION: Status: ACTIVE | Noted: 2024-04-25

## 2024-06-10 PROCEDURE — 99214 OFFICE O/P EST MOD 30 MIN: CPT | Mod: PBBFAC,PO | Performed by: INTERNAL MEDICINE

## 2024-06-10 PROCEDURE — 99205 OFFICE O/P NEW HI 60 MIN: CPT | Mod: S$PBB,,, | Performed by: INTERNAL MEDICINE

## 2024-06-10 PROCEDURE — 99999 PR PBB SHADOW E&M-EST. PATIENT-LVL IV: CPT | Mod: PBBFAC,,, | Performed by: INTERNAL MEDICINE

## 2024-06-10 NOTE — PROGRESS NOTES
Subjective:      Patient ID: Zach Lund is a 89 y.o. White male who presents for new evaluation of Consult    HPI    June 2024:  Referred by PCP for CKD  Lives at [place]   From [place], saw Nephrology in the past, was told 'wasn't good but not very bad either' and that the heart and kidneys work against each other, medications  Saw Cards in the past for CHF, on Bumex for LE edema, and BP uncontrolled   sCr 1.4 in 2023 then 1.8 Jan 2024, 1.9 April and now 2.3mg/dL 750mcg albuminuria in 2023  On Jardiance and Ozempic,  On Bumex 2-4 per day pending volume status   April fall--still not walking   Off Lantus since on Jardiance seeing Endocrine soon   Likes salt   No NSAIDs, only Tylenol or tramadol       Review of Systems   Constitutional:  Negative for activity change and appetite change.   HENT:  Negative for facial swelling.    Respiratory:  Negative for shortness of breath.    Cardiovascular:  Positive for leg swelling.   Genitourinary:  Positive for difficulty urinating.   Musculoskeletal:  Positive for arthralgias.   Allergic/Immunologic: Positive for immunocompromised state.   Psychiatric/Behavioral:  Negative for confusion and decreased concentration.       Objective:     Physical Exam  Vitals and nursing note reviewed.   Constitutional:       General: He is not in acute distress.     Appearance: He is not ill-appearing.      Comments: In motorized w/c    Cardiovascular:      Rate and Rhythm: Normal rate.   Pulmonary:      Breath sounds: No wheezing or rales.   Musculoskeletal:      Right lower leg: Edema present.      Left lower leg: Edema present.   Neurological:      Mental Status: He is alert and oriented to person, place, and time.   Psychiatric:         Mood and Affect: Mood normal.       Assessment:     1. CKD (chronic kidney disease) stage 4, GFR 15-29 ml/min    2. Type 2 diabetes mellitus with stage 4 chronic kidney disease, with long-term current use of insulin    3. Retinopathy due to secondary  diabetes mellitus, without macular edema, with moderate nonproliferative retinopathy    4. Essential hypertension    5. Chronic heart failure with preserved ejection fraction    6. Secondary hyperparathyroidism of renal origin       Plan:     Progressive CKD with macroalbuminuria   - he appears to be currently at Stage 4  - discussed stages of CKD, discussed tips on kidney health, emphasized low sodium diet and increase fruits and vegetables, challenging somewhat as his meals are set at assisted living and he craves sodium   - for treatment continue Jardiance and Ozempic     Will check kidney function along with MBD labs with [facility] for next visit. I suspect he will need D analogue     RTC 3mo   65 minutes of total time spent on the encounter, which includes face to face time and non-face to face time preparing to see the patient (eg, review of tests), Obtaining and/or reviewing separately obtained history, Documenting clinical information in the electronic or other health record, Independently interpreting results (not separately reported) and communicating results to the patient/family/caregiver, or Care coordination (not separately reported).

## 2024-06-13 ENCOUNTER — OFFICE VISIT (OUTPATIENT)
Dept: DIABETES | Facility: CLINIC | Age: 89
End: 2024-06-13
Payer: OTHER GOVERNMENT

## 2024-06-13 DIAGNOSIS — E11.22 TYPE 2 DIABETES MELLITUS WITH STAGE 3B CHRONIC KIDNEY DISEASE, WITH LONG-TERM CURRENT USE OF INSULIN: Primary | Chronic | ICD-10-CM

## 2024-06-13 DIAGNOSIS — Z79.4 TYPE 2 DIABETES MELLITUS WITH STAGE 3B CHRONIC KIDNEY DISEASE, WITH LONG-TERM CURRENT USE OF INSULIN: Primary | Chronic | ICD-10-CM

## 2024-06-13 DIAGNOSIS — N18.32 TYPE 2 DIABETES MELLITUS WITH STAGE 3B CHRONIC KIDNEY DISEASE, WITH LONG-TERM CURRENT USE OF INSULIN: Primary | Chronic | ICD-10-CM

## 2024-06-13 PROCEDURE — 95251 CONT GLUC MNTR ANALYSIS I&R: CPT | Mod: NDTC,,, | Performed by: NURSE PRACTITIONER

## 2024-06-13 PROCEDURE — 99214 OFFICE O/P EST MOD 30 MIN: CPT | Mod: 95,,, | Performed by: NURSE PRACTITIONER

## 2024-06-13 NOTE — PATIENT INSTRUCTIONS
PATIENT INSTRUCTIONS     Continue Jardiance 25 mg by mouth daily.   Continue Ozempic 1 mg subcutaneously every Friday.   Continue Dexcom G7 CGM  Blood Sugar Goals:       Fastin-180.       1-2 hours after a meal: Less than 200.

## 2024-06-13 NOTE — Clinical Note
Dr. Troncoso, I am currently seeing Mr. Lund for his diabetes management. [facility] care [facility] was [facility] for me to manage his diabetes medications. He is doing well on his current doses of Jardiance 25 mg and Ozempic 1 mg. They will not fill the medications [facility] from [facility], and they currently have him prescribed 10 mg and 0.5 mg respectively. Will you please erx the 2 prescriptions to the [facility] pharmacy associated with his CKD diagnosis so he may get his refills before he runs out? I believe he is scheduled to see an endocrinology provider with the [facility] in the next few weeks, but he will run out prior to that. Thank you so much for your help.   [facility]

## 2024-06-13 NOTE — PROGRESS NOTES
The patient location is: Home  The chief complaint leading to consultation is: Diabetes    Visit type: audiovisual    Face to Face time with patient: 15  30 minutes of total time spent on the encounter, which includes face to face time and non-face to face time preparing to see the patient (eg, review of tests), Obtaining and/or reviewing separately obtained history, Documenting clinical information in the electronic or other health record, Independently interpreting results (not separately reported) and communicating results to the patient/family/caregiver, or Care coordination (not separately reported).  Each patient to whom he or she provides medical services by telemedicine is:  (1) informed of the relationship between the physician and patient and the respective role of any other health care provider with respect to management of the patient; and (2) notified that he or she may decline to receive medical services by telemedicine and may withdraw from such care at any time.    Notes:    Zach Lund is a 89 y.o. male who presents for a follow up evaluation of Type 2 diabetes mellitus.     CHIEF COMPLAINT: Diabetes Consultation    PCP: Bao Mcarthur MD      Initial visit with me - 5/23/2024    The patient was initially diagnosed with diabetes in 2017.   Patient from [place], recently moved to [place] with son. He is living at [facility]. Presents today with his son, Hugh, who is very involved in patient's care. Does not have nursing services. Patient is w/c bound currently due to fall in March, fracturing left knee. [facility]      Previous failed treatments include:  Trulicity - switched to ozempic.   Metformin  Glipizide     Social Documentation:  Patient lives in [place] at [facility].   Occupation: retired.   Exercise: none      Diabetes related complications:   nephropathy, retinopathy, and  "cardiovascular disease.   denies Pancreatitis  denies Gastroparesis  denies DKA  denies Hx/family Hx of MEN2/MTC  denies Frequent UTIs/yeast infections     Cardiovascular Risk Factors: advanced age (>55 for men, >65 for women), dyslipidemia, hypertension, male gender, obesity (BMI >30 kg/m2), and sedentary lifestyle.    Diabetes Medications               empagliflozin (JARDIANCE) 25 mg tablet Take 1 tablet (25 mg total) by mouth once daily.    semaglutide (OZEMPIC) 1 mg/dose (4 mg/3 mL) Inject 1 mg into the skin every 7 days.     Current monitoring regimen:  G7 CGM    The patient's Dexcom CGM was downloaded and reviewed. For the past 14 days, patient average glucose was 175 mg/dL. He was above range 37% of the time, in range 63% of the time, and below range 0% of the time. The target range for this patient was 70 - 180 mg/dL. Overall, there was a pattern of post prandial hyperglycemia, but overall improvement since last visit.         Patient currently taking insulin or sulfonylurea?  NO  Recent hypoglycemic episodes: No.     Patient compliant with glucose checks and medication administration? Yes    DIABETES MANAGEMENT STATUS  Statin: Taking  ACE/ARB: Not taking  Screening or Prevention Patient's value Goal Complete/Controlled?   HgA1C Testing and Control   Lab Results   Component Value Date    HGBA1C 6.8 (H) 05/23/2024      Annually/Less than 8% Yes   Lipid profile : 01/15/2024 Annually No   LDL control No results found for: "LDLCALC" Annually/Less than 100 mg/dl  No   Nephropathy screening No results found for: "LABMICR"  No results found for: "PROTEINUA"  No results found for: "UTPCR"   Annually No   Blood pressure BP Readings from Last 1 Encounters:   06/10/24 (!) 160/68    Less than 140/90 No   Dilated retinal exam Most Recent Eye Exam Date: Not Found Annually No   Foot exam   Most Recent Foot Exam Date: Not Found Annually No   Patient's medications, allergies, surgical, social and family histories were " "reviewed and updated as appropriate.     Review of Systems   Constitutional:  Negative for weight loss.   Eyes:  Negative for blurred vision and double vision.   Cardiovascular:  Negative for chest pain.   Gastrointestinal:  Negative for nausea and vomiting.   Genitourinary:  Negative for frequency.   Musculoskeletal:  Negative for falls.   Neurological:  Negative for dizziness and weakness.   Endo/Heme/Allergies:  Negative for polydipsia.   Psychiatric/Behavioral:  Negative for depression.    All other systems reviewed and are negative.       Physical Exam  Constitutional:       General: He is not in acute distress.     Appearance: Normal appearance.   Eyes:      Extraocular Movements: Extraocular movements intact.      Pupils: Pupils are equal, round, and reactive to light.   Cardiovascular:      Rate and Rhythm: Normal rate and regular rhythm.      Heart sounds: Normal heart sounds.   Pulmonary:      Effort: Pulmonary effort is normal. No respiratory distress.      Breath sounds: Normal breath sounds.   Musculoskeletal:      Cervical back: Normal range of motion and neck supple.   Neurological:      Mental Status: He is alert and oriented to person, place, and time.   Psychiatric:         Mood and Affect: Mood normal.         Behavior: Behavior normal.        There were no vitals taken for this visit.  Wt Readings from Last 3 Encounters:   06/10/24 93.4 kg (206 lb)   05/23/24 93.4 kg (206 lb)   05/17/24 92.5 kg (204 lb)       LAB REVIEW  Lab Results   Component Value Date     05/23/2024    K 4.8 05/23/2024     (H) 05/23/2024    CO2 22 (L) 05/23/2024    BUN 34 (H) 05/23/2024    CREATININE 2.3 (H) 05/23/2024    CALCIUM 8.2 (L) 05/23/2024    ANIONGAP 5 (L) 05/23/2024    EGFRNORACEVR 26.3 (A) 05/23/2024     No results found for: "CPEPTIDE", "GLUTAMICACID", "INSLNABS"  Hemoglobin A1C   Date Value Ref Range Status   05/23/2024 6.8 (H) 4.0 - 5.6 % Final     Comment:     ADA Screening Guidelines:  5.7-6.4%  " Consistent with prediabetes  >or=6.5%  Consistent with diabetes    High levels of fetal hemoglobin interfere with the HbA1C  assay. Heterozygous hemoglobin variants (HbS, HgC, etc)do  not significantly interfere with this assay.   However, presence of multiple variants may affect accuracy.          ASSESSMENT    ICD-10-CM ICD-9-CM   1. Type 2 diabetes mellitus with stage 3b chronic kidney disease, with long-term current use of insulin  E11.22 250.40    N18.32 585.3    Z79.4 V58.67       PLAN  Diagnoses and all orders for this visit:    Type 2 diabetes mellitus with stage 3b chronic kidney disease, with long-term current use of insulin        Reviewed pathophysiology of diabetes, complications related to the disease, importance of annual dilated eye exam and daily foot examination. Explained MOA, SE, dosage of medications. Written instructions given and reviewed with patient and patient verbalizes understanding.     2024 - doing well on current medication doses. Has not taken Lantus and instructed to not start. He is 63% in range with no hypoglycemia. He will need new prescriptions [facility]empic [facility], however community care referral was denied. Will consult with nephrology to assist, as that community care referral was approved. He will [facility], and son states they will have appt with endo provider through [facility] in next few weeks, but will run out of current medications prior to appt.     PATIENT INSTRUCTIONS     Continue Jardiance 25 mg by mouth daily.   Continue Ozempic 1 mg subcutaneously every Friday.     Continue Dexcom G7 CGM  Blood Sugar Goals:       Fastin-180.       1-2 hours after a meal: Less than 200.       Follow up in about 2 months (around 2024) for Dexcom.    Portions of this note were prepared with [employer] voice recognition transcription software. Grammatical errors, including garbled syntax, mangle pronouns, and other bizarre  constructions may be attributed to that software system.

## 2024-06-20 ENCOUNTER — PATIENT MESSAGE (OUTPATIENT)
Dept: NEPHROLOGY | Facility: CLINIC | Age: 89
End: 2024-06-20
Payer: MEDICARE

## 2024-06-20 DIAGNOSIS — Z79.4 TYPE 2 DIABETES MELLITUS WITH STAGE 3 CHRONIC KIDNEY DISEASE, WITH LONG-TERM CURRENT USE OF INSULIN, UNSPECIFIED WHETHER STAGE 3A OR 3B CKD: ICD-10-CM

## 2024-06-20 DIAGNOSIS — N18.30 TYPE 2 DIABETES MELLITUS WITH STAGE 3 CHRONIC KIDNEY DISEASE, WITH LONG-TERM CURRENT USE OF INSULIN, UNSPECIFIED WHETHER STAGE 3A OR 3B CKD: ICD-10-CM

## 2024-06-20 DIAGNOSIS — E11.22 TYPE 2 DIABETES MELLITUS WITH STAGE 3 CHRONIC KIDNEY DISEASE, WITH LONG-TERM CURRENT USE OF INSULIN, UNSPECIFIED WHETHER STAGE 3A OR 3B CKD: ICD-10-CM

## 2024-06-28 ENCOUNTER — OFFICE VISIT (OUTPATIENT)
Dept: ORTHOPEDICS | Facility: CLINIC | Age: 89
End: 2024-06-28
Payer: MEDICARE

## 2024-06-28 ENCOUNTER — PATIENT MESSAGE (OUTPATIENT)
Dept: FAMILY MEDICINE | Facility: CLINIC | Age: 89
End: 2024-06-28
Payer: OTHER GOVERNMENT

## 2024-06-28 VITALS — HEIGHT: 70 IN | BODY MASS INDEX: 30.64 KG/M2 | WEIGHT: 214 LBS

## 2024-06-28 DIAGNOSIS — S83.282D TEAR OF LATERAL MENISCUS OF LEFT KNEE, CURRENT, UNSPECIFIED TEAR TYPE, SUBSEQUENT ENCOUNTER: ICD-10-CM

## 2024-06-28 DIAGNOSIS — S82.142G CLOSED FRACTURE OF LEFT TIBIAL PLATEAU WITH DELAYED HEALING, SUBSEQUENT ENCOUNTER: Primary | ICD-10-CM

## 2024-06-28 DIAGNOSIS — S83.241D TEAR OF MEDIAL MENISCUS OF RIGHT KNEE, CURRENT, UNSPECIFIED TEAR TYPE, SUBSEQUENT ENCOUNTER: ICD-10-CM

## 2024-06-28 DIAGNOSIS — M17.11 PRIMARY OSTEOARTHRITIS OF RIGHT KNEE: ICD-10-CM

## 2024-06-28 PROCEDURE — 99213 OFFICE O/P EST LOW 20 MIN: CPT | Mod: PBBFAC,PO | Performed by: STUDENT IN AN ORGANIZED HEALTH CARE EDUCATION/TRAINING PROGRAM

## 2024-06-28 PROCEDURE — 99999 PR PBB SHADOW E&M-EST. PATIENT-LVL III: CPT | Mod: PBBFAC,,, | Performed by: STUDENT IN AN ORGANIZED HEALTH CARE EDUCATION/TRAINING PROGRAM

## 2024-06-28 NOTE — PATIENT INSTRUCTIONS
Assessment:  Zach Lund is a 89 y.o. male   Chief Complaint   Patient presents with    Left Knee - Pain    Right Knee - Pain       No diagnosis found.     Plan:  [facility] to the right knee, if no authorization patient will be responsible for $800 up front cost at check in for cost of kit.   Follow up for PRP injection    PRP  Peer-reviewed literature has demonstrated the efficacy of leukocyte-poor platelet-rich plasma (PRP) for knee arthritis. PRP is known to decrease inflammatory enzymes in the knee (MY Cho, 2016). It also provides grow factors that stimulate stem cells and cartilage cells (Priscilla et al, J Knee Surg, 2018). Research suggests that in some cases it is as effective as stem cell injections. PRP has been shown to be more effective for knee arthritis than corticosteroid injections or viscosupplementation in terms of knee pain and function.    PRP has been shown to be more cost-effective than other forms of treatment for knee arthritis. (Jacki et al, IJCR 2018)    Research has also shown that PRP + viscosupplementation may be synergistic. In fact, chondrocytes cultured with both PRP and HA showed increased proliferation rates and glycosaminoglycan contents compared with those cultured with HA alone, suggesting that the combined injection may provide a more therapeutic environment (Abhilash et al., Arthroscopy, 2018).             What is Platelet - Rich Plasma Therapy?   Platelet-Rich Plasma, or PRP, is produced from your own blood.  The platelets are the cells in our body that contain growth factors, which stimulate the normal wound healiing processk e.g. the same way that your skin heals after a scrape.  With PRP, you create supra-therapeutic level of your own platelets (over 500% more platelets than normal blood) so that you heighten the healing of a chronically injured tissue.  In most cases, we need to remove scar tissue from the injured site by performing a  'percutaneous needle fenestration or debridement'  This procedure allows platelets to work in the injured site.  All PRP injections are done under ultrasound guidance to confirm placement of the PRP in the correct tissue.  Is PRP indicated for me?  PRP therapy is indicated for injuries that have failed to heal despite traditional treatment options.  It can be performed in any musculoskeletal structure, including muscles, tendons, joints and ligaments all over the body.  Some examples include:  partial tendon tears, muscle strains, ligament sprains/partial tears, articular cartilage injury, chronic tendon injuries.      How is PRP made?  To prepare PRP, blood is taken from your arm with a special kit similar to a normal blood test.  It is then placed in a special centrifugation machine that separates the platelets from the blood in order to be extracted in a concentrated form called PRP.  The entire PRP therapy takes about 45 minutes.    How many treatments are necessary?  Most patients achieve successful outcomes with only one injection.  In some cases, a series of three injections is required to achieve significant results.  Each injection is spaced several weeks apart.  There is no limit to the number of treatments you can have; however, literative has shown that about 15% of patients do no see improvement with more than three injections and should consider other treatments.    Are there any side effects?  Since your own blood is used, there is no risk for tranmitted blood- infections.  PRP has a strong antibacterial effect so risk of local infections is minimal.  It is normal to have increased soreness or pain after the procedure for an average of three days.      Will my insurance pay for this treatment?  Since this is a fairly new procedure, most insuance companies have not incorporated it yet to their list of approved procedures and do not consider this to be a reimvursable expense.  There are various PRP  packages to suit your needs.  All packages include the PRP kit, blood draw, centrifugation machine, disposable equipment, ultrasound guidance, and the actual procedure.  A splint for support may be indicated in some cases (additional fee).    What are the potential benefits of PRP?  PRP stimulates healing of the injured tissue by activating your body's natural healing capacity.  Patients on average report more than 50% improvement in 6 weeks and up to 100% improvement in 12 weeks.  This may eliminate the need for more aggressive and expensive treatment options such as long-term medication or surgery.  In addition, PRP Therapy is a minimally-invasive procedure that is done in the clinic; therefore, you go home the same day.    Courtesy of [facility] / Raman Pearson MD      Follow-up: 3 months or sooner if there are problems between now and then.    Thank you for choosing [facility] and Dr. Wagner Hein for your orthopedic & sports medicine care. It is our goal to provide you with exceptional care that will help keep you healthy, active, and get you back in the game.    Please do not hesitate to reach out to us via email, phone, or MyChart with any questions, concerns, or feedback.    If you felt that you received exemplary care today, please consider leaving us feedback on [facility] at:  [email or web address]    If you are experiencing pain/discomfort ,or have questions after 5pm and would like to be connected to the [facility] on-call team, please call this number and specify which Sports Medicine provider is treating you: [phone]

## 2024-06-28 NOTE — PROGRESS NOTES
Patient ID: Zach Lund  YOB: 1933  MRN: [id number]    Chief Complaint: Pain of the Left Knee and Pain of the Right Knee      History of Present Illness: Zach Lund is a right-hand dominant 89 y.o. male who presents today for follow up for left knee fracture.  Patient last seen in clinic on 5/17/2024 for right knee gel injection.  Patient reports that left feels better than right.  Rates left knee pain a 7/10 and right knee pain 9/10 and reports that the pain is greatest when in extended position for a period of time or in weightbearing.  Patient reports that he is having issues with drainage from his lower leg, which patient has several lesions, that pools into his shoe.  Reports tightness in the lower leg close to ankle.  Patient is taking Lyrica and has read that blistering is something that can occur with taking of this medication.  He reports that this has been occurring for approximately one month.  His son stated that they have notified his PCP of the issue.     5/17/2024 Interval History of Present Illness: Zach Lund is a right-hand dominant 89 y.o. male who presents today for bilateral knee MRI review.  Last seen in clinic on 5/6/2024.  Reports current pain in right knee at an 8/10 and in left knee a 6/10.  Patient states that PT came to his house this morning for initial evaluation and believes it went well.     5/6/2024 Interval History of Present Illness: Zach Lund is a right-hand dominant 89 y.o. male who presents today with bilateral knee/ leg pain after a fall. He has a wound on his left lower leg. He is accompanied by his son.  The patient experienced a fall approximately 4 weeks ago. He was exiting the bathroom, reaching over to grab his headrest from the wheelchair. After a brief rest period, he took a backward step, resulting in a loss of control of his legs and a twisting motion. He fell backwards, but his knees became caught in his chair, leading to a  twisting motion. He reports bilateral ankle pain radiating from the ankle to the groin, which intensifies upon standing, preventing him from taking a forward step, necessitating sideways steps. His knees are particularly painful when he attempts to stretch them, but not at rest. His mobility is limited due to knee pain, preventing him from walking, standing, and getting in and out of bed. He is not currently receiving home health care. His pain management regimen includes tramadol, which he uses sparingly,         Past Medical History:   Past Medical History:   Diagnosis Date    Arteriosclerosis of coronary artery 03/22/2024    Atrial fibrillation 09/13/2017    Formatting of this note might be different from the original.   6/26/2020 EKG: Atrial fibrillation      COPD (chronic obstructive pulmonary disease) 06/01/2017    Formatting of this note might be different from the original.   Former smoker, 10.00 pack-year      Last Assessment & Plan:    Formatting of this note might be different from the original.   COPD is unchanged.   COPD information handout given.      Continue albuterol plan.      Diabetic polyneuropathy associated with type 2 diabetes mellitus 05/03/2017    Formatting of this note is different from the original.   Lab Results    Component Value Date/Time     HGBA1C 6.2 (H) 01/26/2023 08:55 AM     GFR 39 (L) 12/09/2022 08:40 AM     GFRCNAFA 34 (L) 02/17/2022 01:44 PM     MICROALBCREA 22.4 10/31/2022 12:11 PM     LDLCALC 43 02/17/2022 01:44 PM       Last Eye Exam: 2/2/2023    Last Foot Exam: 5/18/2022 2/8/24 Discussed higher post prandial glucose    Dyslipidemia 09/13/2017    Essential hypertension 05/03/2017    Heart failure with preserved ejection fraction 09/13/2017    Last Assessment & Plan:    Formatting of this note might be different from the original.   Continue weight management - Continue Bumex. Patient euvolemic on exam.  Formatting of this note might be different from the original.    11/9/2020 Echo: The left ventricular ejection fraction is 60-65% by visual estimation.         Last Assessment & Plan:    Formatting of this note might be different from th    Hypothyroid 05/03/2017    Formatting of this note might be different from the original.   10/31/22 TSH 0.136. Decrease levothyroxine 175 mcg and recheck 12 weeks.      Last Assessment & Plan:    Formatting of this note might be different from the original.   Continue home dose of Synthroid.      Recurrent major depressive disorder, in partial remission 07/13/2017    Formatting of this note is different from the original.        1/13/2023     10:00 AM 10/31/2022     10:00 AM 8/30/2022      2:30 PM 7/28/2022     10:00 AM 2/4/2021     10:00 AM    PHQ9 Trend    PHQ9 Total Score 5 0 0 8 0          Last Assessment & Plan:    Formatting of this note might be different from the original.   Patient having an exacerbation of anxiety due to health condition. 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   Lab Results    Component Value Date/Time     HGBA1C 6.2 (H) 01/26/2023 08:55 AM      Stage 3b chronic kidney disease 11/11/2020    Formatting of this note is different from the original.   Lab Results    Component Value Date/Time     GFR 39 (L) 12/09/2022 08:40 AM     GFR 39 (L) 07/27/2022 12:30 PM     GFR 36 (L) 06/14/2022 10:25 AM     GFRCNAFA 34 (L) 02/17/2022 01:44 PM     GFRCNAFA 34 (L) 10/07/2021 10:19 AM     GFRCNAFA 34 (L) 08/05/2021 09:29 AM          Last Assessment & Plan:    Formatting of this note might be differe    Type 2 diabetes mellitus with stage 3 chronic kidney disease, with long-term current use of insulin 05/03/2017    Formatting of this note is different from the original.   Lab Results    Component Value Date/Time     HGBA1C 6.2 (H) 01/26/2023 08:55 AM     GFR 39 (L) 12/09/2022  08:40 AM     GFRCNAFA 34 (L) 2022 01:44 PM     MICROALBCREA 22.4 10/31/2022 12:11 PM     LDLCALC 43 2022 01:44 PM       Last Eye Exam: 2023    Last Foot Exam: 2022       Last Assessment & Plan:    Formatting of this     Past Surgical History:   Procedure Laterality Date    CHOLECYSTECTOMY  2012    EYE SURGERY  2011    cataracts    PROSTATE SURGERY  2017    Urolift    TONSILLECTOMY       Family History   Problem Relation Name Age of Onset    Arthritis Mother Jacque     Mental illness Father Brandon     Cancer Brother Don      Social History     Socioeconomic History    Marital status:    Tobacco Use    Smoking status: Former     Current packs/day: 0.00     Average packs/day: 0.1 packs/day for 45.0 years (2.3 ttl pk-yrs)     Types: Cigarettes     Start date: 1955     Quit date: 2000     Years since quittin.5    Tobacco comments:     never a regular smoker.  would only have one in social situations   Substance and Sexual Activity    Drug use: Never    Sexual activity: Not Currently     Partners: Female     Birth control/protection: Other-see comments     Comment: My stuff doesnt work anymore     Social Determinants of Health     Financial Resource Strain: Medium Risk (3/19/2024)    Overall Financial Resource Strain (CARDIA)     Difficulty of Paying Living Expenses: Somewhat hard   Food Insecurity: Food Insecurity Present (3/19/2024)    Hunger Vital Sign     Worried About Running Out of Food in the Last Year: Sometimes true     Ran Out of Food in the Last Year: Never true   Transportation Needs: Unmet Transportation Needs (3/19/2024)    PRAPARE - Transportation     Lack of Transportation (Medical): Yes     Lack of Transportation (Non-Medical): No   Physical Activity: Inactive (3/19/2024)    Exercise Vital Sign     Days of Exercise per Week: 0 days     Minutes of Exercise per Session: 0 min   Stress: Stress Concern Present (3/19/2024)    [employer] -  Occupational Stress Questionnaire     Feeling of Stress : To some extent   Housing Stability: High Risk (3/19/2024)    Housing Stability Vital Sign     Unable to Pay for Housing in the Last Year: No     Number of Places Lived in the Last Year: 4     Unstable Housing in the Last Year: No     Medication List with Changes/Refills   Current Medications    ALBUTEROL (ACCUNEB) 1.25 MG/3 ML NEBU    Take 3 mLs (1.25 mg total) by nebulization every 6 (six) hours as needed (cough, shortness of breath). Rescue    ALBUTEROL (PROVENTIL/VENTOLIN HFA) 90 MCG/ACTUATION INHALER    Inhale 2 puffs into the lungs every 4 (four) hours as needed.    APIXABAN (ELIQUIS) 2.5 MG TAB    2.5 mg.    ATORVASTATIN (LIPITOR) 20 MG TABLET    20 mg.    BUMETANIDE (BUMEX) 1 MG TABLET    Take two tablet early in the morning and one right after lunch.    CARVEDILOL (COREG) 3.125 MG TABLET    3.125 mg.    CITALOPRAM (CELEXA) 20 MG TABLET    20 mg.    DOXAZOSIN (CARDURA) 8 MG TAB    Take 8 mg by mouth once daily.    DUTASTERIDE (AVODART) 0.5 MG CAPSULE    Take 1 capsule by mouth once daily.    EMPAGLIFLOZIN (JARDIANCE) 25 MG TABLET    Take 1 tablet (25 mg total) by mouth once daily.    FLUOCINONIDE 0.05% (LIDEX) 0.05 % CREAM    APPLY SMALL AMOUNT TOPICALLY TWICE A DAY AS NEEDED ITCHING    LEVOTHYROXINE (SYNTHROID) 200 MCG TABLET    Take 1 tablet by mouth once daily.    POTASSIUM CHLORIDE (MICRO-K) 10 MEQ CPSR    Take 1 capsule (10 mEq total) by mouth once daily.    PREGABALIN (LYRICA) 75 MG CAPSULE    Take 1 capsule (75 mg total) by mouth 2 (two) times daily.    ROPINIROLE (REQUIP) 0.25 MG TABLET    0.25 mg.    SEMAGLUTIDE (OZEMPIC) 1 MG/DOSE (4 MG/3 ML)    Inject 1 mg into the skin every 7 days.    TIOTROPIUM (SPIRIVA) 18 MCG INHALATION CAPSULE    INHALE 1 CAP BY MOUTH EVERY DAY    TRAMADOL (ULTRAM) 50 MG TABLET    Take 1 tablet (50 mg total) by mouth every 8 (eight) hours as needed for Pain.    TRIAMCINOLONE ACETONIDE 0.1% (KENALOG) 0.1 % CREAM     APPLY SMALL AMOUNT TOPICALLY TWICE A DAY AS NEEDED FOR ITCHING    VITAMIN D (VITAMIN D3) 1000 UNITS TAB    25 mcg.     Review of patient's allergies indicates:   Allergen Reactions    Iodinated contrast media Anaphylaxis    Iodine Anaphylaxis     Breathing issue    Lobster Anaphylaxis       Physical Exam:   Body mass index is 30.71 kg/m².    GENERAL: Well appearing, in no acute distress.  HEAD: Normocephalic and atraumatic.  ENT: External ears and nose grossly normal.  EYES: EOMI bilaterally  PULMONARY: Respirations are grossly even and non-labored.  NEURO: Awake, alert, and oriented x 3.  SKIN: No obvious rashes appreciated.  PSYCH: Mood & affect are appropriate.    Detailed MSK exam:     Left knee exam:   -ROM: extension +10, flexion 110  -TTP: Medial joint line and Lateral joint line  -effusion: present  -Patellar apprehension negative  -Jayme test negative  -stable to varus and valgus stress tests  -Lachman test difficult to assess, anterior drawer test difficult to assess, posterior drawer test difficult to assess     Right knee exam:   -ROM: extension +10, flexion 110  -TTP: Medial joint line and Lateral joint line  -effusion: present  -Patellar apprehension negative  -Jayme test positive -  medial  -stable to varus and valgus stress tests  -Lachman test difficult to assess, anterior drawer test difficult to assess, posterior drawer test difficult to assess    Imaging:  Sports Medicine US - Guidance for Needle Placement  Wagner Hein MD     5/17/2024  2:40 PM  Sports Medicine US - Guidance for Needle Placement    Date/Time: 5/17/2024 1:40 PM    Performed by: Wagner Hein MD  Authorized by: Wagner Hein MD  Preparation: Patient was prepped and   draped in the usual sterile fashion.  Local anesthesia used: no    Anesthesia:  Local anesthesia used: no    Sedation:  Patient sedated: no    Patient tolerance: patient tolerated the procedure well with no immediate   complications  Comments:  Ultrasound guidance was used for needle localization. Images   were saved and stored for documentation. The appropriate structures were   visualized. Dynamic visualization of the needle was continuous throughout   the procedures and maintained good position.         Relevant imaging results were reviewed and interpreted by me and per my read shows left tibial plateau fracture on MRI.  This was discussed with the patient and / or family today.     Assessment:  Zach Lund is a 89 y.o. male following up for bilateral knee pain. Right knee gel injection ineffective. Interested in trying PRP injection.   Plan: prior auth [facility]. Patient's left tibial plateau fracture healing slowly d/t not being able to immobilize appropriately.   Follow up  for PRP injection . All questions answered.     Closed fracture of left tibial plateau with delayed healing, subsequent encounter    Primary osteoarthritis of right knee  -     Platelet Rich Plasma (PRP) Injection; Future    Tear of medial meniscus of right knee, current, unspecified tear type, subsequent encounter  -     Platelet Rich Plasma (PRP) Injection; Future    Tear of lateral meniscus of left knee, current, unspecified tear type, subsequent encounter           Electronically signed:  Wagner Hein MD, MPH  06/28/2024  2:38 PM

## 2024-07-03 ENCOUNTER — OFFICE VISIT (OUTPATIENT)
Dept: FAMILY MEDICINE | Facility: CLINIC | Age: 89
End: 2024-07-03
Payer: MEDICARE

## 2024-07-03 ENCOUNTER — LAB VISIT (OUTPATIENT)
Dept: LAB | Facility: HOSPITAL | Age: 89
End: 2024-07-03
Attending: NURSE PRACTITIONER
Payer: MEDICARE

## 2024-07-03 VITALS
WEIGHT: 211 LBS | RESPIRATION RATE: 16 BRPM | BODY MASS INDEX: 30.21 KG/M2 | HEART RATE: 56 BPM | SYSTOLIC BLOOD PRESSURE: 138 MMHG | TEMPERATURE: 98 F | HEIGHT: 70 IN | DIASTOLIC BLOOD PRESSURE: 76 MMHG | OXYGEN SATURATION: 95 %

## 2024-07-03 DIAGNOSIS — M79.605 PAIN IN BOTH LOWER EXTREMITIES: ICD-10-CM

## 2024-07-03 DIAGNOSIS — I50.32 CHRONIC HEART FAILURE WITH PRESERVED EJECTION FRACTION: ICD-10-CM

## 2024-07-03 DIAGNOSIS — L97.922 ULCER OF LEFT LOWER EXTREMITY WITH FAT LAYER EXPOSED: Primary | ICD-10-CM

## 2024-07-03 DIAGNOSIS — M79.89 SWELLING OF LOWER EXTREMITY: ICD-10-CM

## 2024-07-03 DIAGNOSIS — M79.604 PAIN IN BOTH LOWER EXTREMITIES: ICD-10-CM

## 2024-07-03 DIAGNOSIS — N18.4 CHRONIC KIDNEY DISEASE, STAGE 4 (SEVERE): ICD-10-CM

## 2024-07-03 PROBLEM — R00.1 SINUS BRADYCARDIA: Status: RESOLVED | Noted: 2017-09-28 | Resolved: 2024-07-03

## 2024-07-03 PROBLEM — F40.240 CLAUSTROPHOBIA: Status: RESOLVED | Noted: 2017-08-11 | Resolved: 2024-07-03

## 2024-07-03 PROBLEM — N18.32 STAGE 3B CHRONIC KIDNEY DISEASE: Chronic | Status: RESOLVED | Noted: 2020-11-11 | Resolved: 2024-07-03

## 2024-07-03 PROBLEM — R06.00 DYSPNEA: Status: RESOLVED | Noted: 2020-02-20 | Resolved: 2024-07-03

## 2024-07-03 PROBLEM — R26.89 BALANCE PROBLEM: Status: RESOLVED | Noted: 2017-05-31 | Resolved: 2024-07-03

## 2024-07-03 LAB
ALBUMIN SERPL BCP-MCNC: 2.5 G/DL (ref 3.5–5.2)
ALP SERPL-CCNC: 108 U/L (ref 55–135)
ALT SERPL W/O P-5'-P-CCNC: 24 U/L (ref 10–44)
ANION GAP SERPL CALC-SCNC: 11 MMOL/L (ref 8–16)
AST SERPL-CCNC: 25 U/L (ref 10–40)
BASOPHILS # BLD AUTO: 0.02 K/UL (ref 0–0.2)
BASOPHILS NFR BLD: 0.3 % (ref 0–1.9)
BILIRUB SERPL-MCNC: 0.3 MG/DL (ref 0.1–1)
BUN SERPL-MCNC: 51 MG/DL (ref 8–23)
CALCIUM SERPL-MCNC: 7.8 MG/DL (ref 8.7–10.5)
CHLORIDE SERPL-SCNC: 102 MMOL/L (ref 95–110)
CO2 SERPL-SCNC: 26 MMOL/L (ref 23–29)
CREAT SERPL-MCNC: 3.1 MG/DL (ref 0.5–1.4)
DIFFERENTIAL METHOD BLD: ABNORMAL
EOSINOPHIL # BLD AUTO: 0.2 K/UL (ref 0–0.5)
EOSINOPHIL NFR BLD: 2.7 % (ref 0–8)
ERYTHROCYTE [DISTWIDTH] IN BLOOD BY AUTOMATED COUNT: 15 % (ref 11.5–14.5)
EST. GFR  (NO RACE VARIABLE): 18.4 ML/MIN/1.73 M^2
GLUCOSE SERPL-MCNC: 223 MG/DL (ref 70–110)
HCT VFR BLD AUTO: 33.7 % (ref 40–54)
HGB BLD-MCNC: 10.2 G/DL (ref 14–18)
IMM GRANULOCYTES # BLD AUTO: 0.04 K/UL (ref 0–0.04)
IMM GRANULOCYTES NFR BLD AUTO: 0.6 % (ref 0–0.5)
LYMPHOCYTES # BLD AUTO: 1.9 K/UL (ref 1–4.8)
LYMPHOCYTES NFR BLD: 26.3 % (ref 18–48)
MCH RBC QN AUTO: 29.8 PG (ref 27–31)
MCHC RBC AUTO-ENTMCNC: 30.3 G/DL (ref 32–36)
MCV RBC AUTO: 99 FL (ref 82–98)
MONOCYTES # BLD AUTO: 0.8 K/UL (ref 0.3–1)
MONOCYTES NFR BLD: 11.7 % (ref 4–15)
NEUTROPHILS # BLD AUTO: 4.2 K/UL (ref 1.8–7.7)
NEUTROPHILS NFR BLD: 58.4 % (ref 38–73)
NRBC BLD-RTO: 0 /100 WBC
PLATELET # BLD AUTO: 156 K/UL (ref 150–450)
PMV BLD AUTO: 12.1 FL (ref 9.2–12.9)
POTASSIUM SERPL-SCNC: 4.7 MMOL/L (ref 3.5–5.1)
PROT SERPL-MCNC: 6.3 G/DL (ref 6–8.4)
RBC # BLD AUTO: 3.42 M/UL (ref 4.6–6.2)
SODIUM SERPL-SCNC: 139 MMOL/L (ref 136–145)
WBC # BLD AUTO: 7.12 K/UL (ref 3.9–12.7)

## 2024-07-03 PROCEDURE — 87070 CULTURE OTHR SPECIMN AEROBIC: CPT | Performed by: NURSE PRACTITIONER

## 2024-07-03 PROCEDURE — 83880 ASSAY OF NATRIURETIC PEPTIDE: CPT | Performed by: NURSE PRACTITIONER

## 2024-07-03 PROCEDURE — 99215 OFFICE O/P EST HI 40 MIN: CPT | Mod: PBBFAC,PO | Performed by: NURSE PRACTITIONER

## 2024-07-03 PROCEDURE — 36415 COLL VENOUS BLD VENIPUNCTURE: CPT | Mod: PO | Performed by: NURSE PRACTITIONER

## 2024-07-03 PROCEDURE — 87077 CULTURE AEROBIC IDENTIFY: CPT | Performed by: NURSE PRACTITIONER

## 2024-07-03 PROCEDURE — 99999 PR PBB SHADOW E&M-EST. PATIENT-LVL V: CPT | Mod: PBBFAC,,, | Performed by: NURSE PRACTITIONER

## 2024-07-03 PROCEDURE — 87186 SC STD MICRODIL/AGAR DIL: CPT | Performed by: NURSE PRACTITIONER

## 2024-07-03 PROCEDURE — 99214 OFFICE O/P EST MOD 30 MIN: CPT | Mod: S$PBB,,, | Performed by: NURSE PRACTITIONER

## 2024-07-03 PROCEDURE — 80053 COMPREHEN METABOLIC PANEL: CPT | Performed by: NURSE PRACTITIONER

## 2024-07-03 PROCEDURE — 85025 COMPLETE CBC W/AUTO DIFF WBC: CPT | Performed by: NURSE PRACTITIONER

## 2024-07-03 RX ORDER — CLINDAMYCIN HYDROCHLORIDE 300 MG/1
300 CAPSULE ORAL 3 TIMES DAILY
Qty: 30 CAPSULE | Refills: 0 | Status: SHIPPED | OUTPATIENT
Start: 2024-07-03 | End: 2024-07-13

## 2024-07-03 RX ORDER — MUPIROCIN 20 MG/G
OINTMENT TOPICAL 3 TIMES DAILY
Qty: 30 G | Refills: 0 | Status: SHIPPED | OUTPATIENT
Start: 2024-07-03

## 2024-07-03 NOTE — PROGRESS NOTES
"Assessment/Plan:  Problem List Items Addressed This Visit          Cardiac/Vascular    Heart failure with preserved ejection fraction    Overview     Chronic. Control uncertain. Followed by cardiology, Dr. Hill. Currently taking Bumex 2 mg in the morning and 1 after lunch. He was on lasix in the past but states medication was "too strong". Hx of CKD (followed by nephrology). He does have fluctuating weight and swelling to BLE. Chronic dyspnea on exertion. No recent echo for review. Last Echo 11/9/2020 the left ventricular ejection fraction is 60-65% by visual estimation.         Current Assessment & Plan     Labs today. Will check BNP. Discussed may need updated echo. Recommend patient closely follow up with cardiology and nephrology for further evaluation and management of his diuretics. Continue to monitor weight. ER precautions.          Relevant Orders    B-TYPE NATRIURETIC PEPTIDE       Renal/    Stage 4 chronic kidney disease    Overview     Chronic. Followed by nephrology. He is on bumex.     BMP  Lab Results   Component Value Date     05/23/2024    K 4.8 05/23/2024     (H) 05/23/2024    CO2 22 (L) 05/23/2024    BUN 34 (H) 05/23/2024    CREATININE 2.3 (H) 05/23/2024    CALCIUM 8.2 (L) 05/23/2024    ANIONGAP 5 (L) 05/23/2024    EGFRNORACEVR 26.3 (A) 05/23/2024            Current Assessment & Plan     Recheck renal function. Follow up with nephrology. Avoid NSAIDs and nephrotoxic medications.             Orthopedic    Ulcer of left lower extremity with fat layer exposed - Primary    Overview     New wound to left lower leg. Open, weeping fluid. There is minimal surrounding redness, warmth drainage. Denies fever, chills. Chronic bilateral extremity edema w/ venous stasis and history of CHF. There is a fatty layer exposed. He is diabetic. He does have leg pain with ambulation.          Current Assessment & Plan     Vascular vs diabetic ulcer. Arterial U/S ordered. Start Clindamycin and topical " "Bactroban. The wound is draining serous fluid which was sent for culture. Recommend wound care. He is a patient at [facility]. Home health ordered w/ PT/OT/Social work/Wound care ordered. Nonadherent dressing applied in clinic and wrapped w/ kerlex.          Relevant Medications    mupirocin (BACTROBAN) 2 % ointment    clindamycin (CLEOCIN) 300 MG capsule    Other Relevant Orders    Ambulatory referral/consult to Home Health    CULTURE, AEROBIC  (SPECIFY SOURCE)     Other Visit Diagnoses       Swelling of lower extremity        Relevant Orders    US Lower Extremity Veins Bilateral Insuf    Comprehensive Metabolic Panel    CBC Auto Differential    B-TYPE NATRIURETIC PEPTIDE    Pain in both lower extremities        Relevant Orders    US Lower Extrem Arteries Bilat with AKBAR (xpd)          Follow up if symptoms worsen or fail to improve.  ER precautions for severe or worsening symptoms.     Chel Rocha NP  _____________________________________________________________________________________________________________________________________________________    CC:  Lower extremity edema      HPI: Patient is a 89-year-old male who presents in clinic today accompanied by his son as an established patient here for lower extremity edema. This is a chronic problem. Worsening over the last few weeks. Patient reports left leg is worse than the right. Associated with wounds to LLE as well. He reports that he thinks the wound is from Lyrica. States that he started lyrica 6-8 weeks ago. However, notes that the lyrica does seem to be helping his pain. He reports chronic lower extremity discoloration and pain. He has a history of congestive heart failure and venous stasis. Followed by cardiologist, Dr. Hill. He is taking Bumex as prescribed. States was on lasix in the past but medication was "too strong" for his kidneys. He report no current urinary issues. He is voiding very frequently with medication.  He has had some weight " gain. He reports chronic dyspnea on exertion, no significant change from baseline. No known history of DVT. He is on long term eliquis for atrial fibrillation. He has had no chest pain, palpitations, syncope, fever, chills. There has been no recent injuries, falls, or known trauma to affected area.     Wt Readings from Last 3 Encounters:   07/03/24 1332 95.7 kg (211 lb)   06/28/24 1421 97.1 kg (214 lb)   06/10/24 1014 93.4 kg (206 lb)     Past Medical History:  Past Medical History:   Diagnosis Date    Arteriosclerosis of coronary artery 03/22/2024    Atrial fibrillation 09/13/2017    Formatting of this note might be different from the original.   6/26/2020 EKG: Atrial fibrillation      COPD (chronic obstructive pulmonary disease) 06/01/2017    Formatting of this note might be different from the original.   Former smoker, 10.00 pack-year      Last Assessment & Plan:    Formatting of this note might be different from the original.   COPD is unchanged.   COPD information handout given.      Continue albuterol plan.      Diabetic polyneuropathy associated with type 2 diabetes mellitus 05/03/2017    Formatting of this note is different from the original.   Lab Results    Component Value Date/Time     HGBA1C 6.2 (H) 01/26/2023 08:55 AM     GFR 39 (L) 12/09/2022 08:40 AM     GFRCNAFA 34 (L) 02/17/2022 01:44 PM     MICROALBCREA 22.4 10/31/2022 12:11 PM     LDLCALC 43 02/17/2022 01:44 PM       Last Eye Exam: 2/2/2023    Last Foot Exam: 5/18/2022 2/8/24 Discussed higher post prandial glucose    Dyslipidemia 09/13/2017    Essential hypertension 05/03/2017    Heart failure with preserved ejection fraction 09/13/2017    Last Assessment & Plan:    Formatting of this note might be different from the original.   Continue weight management - Continue Bumex. Patient euvolemic on exam.  Formatting of this note might be different from the original.   11/9/2020 Echo: The left ventricular ejection fraction is 60-65% by visual  estimation.         Last Assessment & Plan:    Formatting of this note might be different from th    Hypothyroid 05/03/2017    Formatting of this note might be different from the original.   10/31/22 TSH 0.136. Decrease levothyroxine 175 mcg and recheck 12 weeks.      Last Assessment & Plan:    Formatting of this note might be different from the original.   Continue home dose of Synthroid.      Recurrent major depressive disorder, in partial remission 07/13/2017    Formatting of this note is different from the original.        1/13/2023     10:00 AM 10/31/2022     10:00 AM 8/30/2022      2:30 PM 7/28/2022     10:00 AM 2/4/2021     10:00 AM    PHQ9 Trend    PHQ9 Total Score 5 0 0 8 0          Last Assessment & Plan:    Formatting of this note might be different from the original.   Patient having an exacerbation of anxiety due to health condition. 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   Lab Results    Component Value Date/Time     HGBA1C 6.2 (H) 01/26/2023 08:55 AM      Stage 3b chronic kidney disease 11/11/2020    Formatting of this note is different from the original.   Lab Results    Component Value Date/Time     GFR 39 (L) 12/09/2022 08:40 AM     GFR 39 (L) 07/27/2022 12:30 PM     GFR 36 (L) 06/14/2022 10:25 AM     GFRCNAFA 34 (L) 02/17/2022 01:44 PM     GFRCNAFA 34 (L) 10/07/2021 10:19 AM     GFRCNAFA 34 (L) 08/05/2021 09:29 AM          Last Assessment & Plan:    Formatting of this note might be differe    Type 2 diabetes mellitus with stage 3 chronic kidney disease, with long-term current use of insulin 05/03/2017    Formatting of this note is different from the original.   Lab Results    Component Value Date/Time     HGBA1C 6.2 (H) 01/26/2023 08:55 AM     GFR 39 (L) 12/09/2022 08:40 AM     GFRCNAFA 34 (L) 02/17/2022 01:44 PM     MICROALBCREA 22.4  10/31/2022 12:11 PM     LDLCALC 43 2022 01:44 PM       Last Eye Exam: 2023    Last Foot Exam: 2022       Last Assessment & Plan:    Formatting of this     Past Surgical History:   Procedure Laterality Date    CHOLECYSTECTOMY  2012    EYE SURGERY  2011    cataracts    PROSTATE SURGERY  2017    Urolift    TONSILLECTOMY       Review of patient's allergies indicates:   Allergen Reactions    Iodinated contrast media Anaphylaxis    Iodine Anaphylaxis     Breathing issue    Lobster Anaphylaxis     Social History     Tobacco Use    Smoking status: Former     Current packs/day: 0.00     Average packs/day: 0.1 packs/day for 45.0 years (2.3 ttl pk-yrs)     Types: Cigarettes     Start date: 1955     Quit date: 2000     Years since quittin.5    Tobacco comments:     never a regular smoker.  would only have one in social situations   Substance Use Topics    Drug use: Never     Family History   Problem Relation Name Age of Onset    Arthritis Mother Jacque     Mental illness Father Brandon     Cancer Brother Don      Current Outpatient Medications on File Prior to Visit   Medication Sig Dispense Refill    albuterol (ACCUNEB) 1.25 mg/3 mL Nebu Take 3 mLs (1.25 mg total) by nebulization every 6 (six) hours as needed (cough, shortness of breath). Rescue 75 mL 11    albuterol (PROVENTIL/VENTOLIN HFA) 90 mcg/actuation inhaler Inhale 2 puffs into the lungs every 4 (four) hours as needed.      apixaban (ELIQUIS) 2.5 mg Tab 2.5 mg.      atorvastatin (LIPITOR) 20 MG tablet 20 mg.      bumetanide (BUMEX) 1 MG tablet Take two tablet early in the morning and one right after lunch.      carvediloL (COREG) 3.125 MG tablet 3.125 mg.      citalopram (CELEXA) 20 MG tablet 20 mg.      doxazosin (CARDURA) 8 MG Tab Take 8 mg by mouth once daily.      dutasteride (AVODART) 0.5 mg capsule Take 1 capsule by mouth once daily.      empagliflozin (JARDIANCE) 25 mg tablet Take 1 tablet (25 mg total) by mouth once daily. 90 tablet 3     fluocinonide 0.05% (LIDEX) 0.05 % cream APPLY SMALL AMOUNT TOPICALLY TWICE A DAY AS NEEDED ITCHING      levothyroxine (SYNTHROID) 200 MCG tablet Take 1 tablet by mouth once daily.      potassium chloride (MICRO-K) 10 MEQ CpSR Take 1 capsule (10 mEq total) by mouth once daily. 30 capsule 12    pregabalin (LYRICA) 75 MG capsule Take 1 capsule (75 mg total) by mouth 2 (two) times daily. 60 capsule 12    rOPINIRole (REQUIP) 0.25 MG tablet 0.25 mg.      semaglutide (OZEMPIC) 1 mg/dose (4 mg/3 mL) Inject 1 mg into the skin every 7 days. 9 mL 3    tiotropium (SPIRIVA) 18 mcg inhalation capsule INHALE 1 CAP BY MOUTH EVERY DAY      traMADoL (ULTRAM) 50 mg tablet Take 1 tablet (50 mg total) by mouth every 8 (eight) hours as needed for Pain. 12 tablet 0    triamcinolone acetonide 0.1% (KENALOG) 0.1 % cream APPLY SMALL AMOUNT TOPICALLY TWICE A DAY AS NEEDED FOR ITCHING      vitamin D (VITAMIN D3) 1000 units Tab 25 mcg.       No current facility-administered medications on file prior to visit.     Review of Systems   Constitutional:  Negative for appetite change, chills, fatigue and fever.   HENT:  Negative for congestion, rhinorrhea and sore throat.    Eyes:  Negative for visual disturbance.   Respiratory:  Negative for cough and shortness of breath.    Cardiovascular:  Positive for leg swelling. Negative for chest pain and palpitations.   Gastrointestinal:  Negative for abdominal pain, diarrhea and vomiting.   Genitourinary:  Negative for difficulty urinating, dysuria and hematuria.   Musculoskeletal:  Positive for arthralgias and gait problem. Negative for myalgias.   Skin:  Positive for color change and wound. Negative for rash.   Neurological:  Positive for weakness. Negative for dizziness and headaches.   Hematological:  Bruises/bleeds easily.   Psychiatric/Behavioral:  Negative for behavioral problems. The patient is not nervous/anxious.      Vitals:    07/03/24 1332 07/03/24 1431   BP: (!) 162/74 138/76   BP Location:  "Right arm Right arm   Patient Position: Sitting Sitting   BP Method: Medium (Manual) Medium (Manual)   Pulse: (!) 56    Resp: 16    Temp: 97.9 °F (36.6 °C)    TempSrc: Oral    SpO2: 95%    Weight: 95.7 kg (211 lb)    Height: 5' 10" (1.778 m)      Wt Readings from Last 3 Encounters:   07/03/24 95.7 kg (211 lb)   06/28/24 97.1 kg (214 lb)   06/10/24 93.4 kg (206 lb)     Physical Exam  Vitals and nursing note reviewed.   Constitutional:       General: He is not in acute distress.     Appearance: He is well-developed. He is not diaphoretic.      Comments:   Present with son, sitting in wheelchair   HENT:      Head: Normocephalic and atraumatic.      Right Ear: External ear normal.      Left Ear: External ear normal.      Nose: Nose normal. No rhinorrhea.   Eyes:      Extraocular Movements: Extraocular movements intact.      Pupils: Pupils are equal, round, and reactive to light.   Cardiovascular:      Rate and Rhythm: Bradycardia present. Rhythm irregular.      Pulses: Normal pulses.      Heart sounds: Murmur heard.   Pulmonary:      Effort: Pulmonary effort is normal. No respiratory distress.      Breath sounds: No wheezing.   Abdominal:      General: Bowel sounds are normal.      Palpations: Abdomen is soft.   Musculoskeletal:         General: Tenderness present. Normal range of motion.      Cervical back: Normal range of motion and neck supple.      Right lower leg: Edema present.      Left lower leg: Edema present.   Skin:     General: Skin is warm and dry.      Capillary Refill: Capillary refill takes less than 2 seconds.      Findings: Lesion, rash and wound present.      Comments: See photos below   Neurological:      General: No focal deficit present.      Mental Status: He is alert and oriented to person, place, and time. Mental status is at baseline.      Cranial Nerves: No cranial nerve deficit.      Motor: Weakness present.      Gait: Gait abnormal.   Psychiatric:         Attention and Perception: He is " attentive.         Mood and Affect: Mood normal. Mood is not anxious or depressed. Affect is not labile, blunt, angry or inappropriate.         Speech: He is communicative. Speech is not rapid and pressured, delayed, slurred or tangential.         Behavior: Behavior normal. Behavior is not agitated, slowed, aggressive, withdrawn, hyperactive or combative.         Thought Content: Thought content normal. Thought content is not paranoid or delusional. Thought content does not include homicidal or suicidal ideation. Thought content does not include homicidal or suicidal plan.         Cognition and Memory: Memory is not impaired.         Judgment: Judgment normal. Judgment is not impulsive or inappropriate.               Health Maintenance   Topic Date Due    Eye Exam  Never done    Shingles Vaccine (1 of 2) Never done    Hemoglobin A1c  11/23/2024    Lipid Panel  01/15/2025    TETANUS VACCINE  11/16/2027     Visit today included increased complexity associated with the care of the episodic problem - see above- addressed and managing the longitudinal care of the patient due to the serious and/or complex managed problem(s) - see above.

## 2024-07-03 NOTE — ASSESSMENT & PLAN NOTE
Labs today. Will check BNP. Discussed may need updated echo. Recommend patient closely follow up with cardiology and nephrology for further evaluation and management of his diuretics. Continue to monitor weight. ER precautions.

## 2024-07-03 NOTE — ASSESSMENT & PLAN NOTE
Vascular vs diabetic ulcer. Arterial U/S ordered. Start Clindamycin and topical Bactroban. The wound is draining serous fluid which was sent for culture. Recommend wound care. He is a patient at [facility]. Home health ordered w/ PT/OT/Social work/Wound care ordered. Nonadherent dressing applied in clinic and wrapped w/ kerlex.

## 2024-07-04 LAB — BNP SERPL-MCNC: 436 PG/ML (ref 0–99)

## 2024-07-05 ENCOUNTER — TELEPHONE (OUTPATIENT)
Dept: FAMILY MEDICINE | Facility: CLINIC | Age: 89
End: 2024-07-05
Payer: MEDICARE

## 2024-07-05 NOTE — PROGRESS NOTES
The patient has staph aureus.  The patient was started on clindamycin which is a reasonable choice.  Susceptibility studies are pending at this time.  Please confirm that the patient got the medicine and is using it along with the mupirocin and is not getting worse.

## 2024-07-06 LAB — BACTERIA SPEC AEROBE CULT: ABNORMAL

## 2024-07-08 ENCOUNTER — TELEPHONE (OUTPATIENT)
Dept: FAMILY MEDICINE | Facility: CLINIC | Age: 89
End: 2024-07-08
Payer: OTHER GOVERNMENT

## 2024-07-08 ENCOUNTER — PATIENT MESSAGE (OUTPATIENT)
Dept: CARDIOLOGY | Facility: CLINIC | Age: 89
End: 2024-07-08
Payer: MEDICARE

## 2024-07-08 ENCOUNTER — PATIENT MESSAGE (OUTPATIENT)
Dept: FAMILY MEDICINE | Facility: CLINIC | Age: 89
End: 2024-07-08
Payer: MEDICARE

## 2024-07-08 NOTE — TELEPHONE ENCOUNTER
----- Message from Jay Ramirez sent at 7/8/2024  3:15 PM CDT -----  Contact: Cheri/ [facility]  .Type:  Needs Advice    Who Called:  Cheri     Would the patient rather a call back or a response via [facility]?  Call back   Best Call Back Number:  [phone]    Additional Information:  needs to get clarification on the wound care orders and will be submitting a plan of care within 24hrs that needs to be signed         Thanks

## 2024-07-10 ENCOUNTER — TELEPHONE (OUTPATIENT)
Dept: PULMONOLOGY | Facility: CLINIC | Age: 89
End: 2024-07-10
Payer: OTHER GOVERNMENT

## 2024-07-10 ENCOUNTER — PATIENT MESSAGE (OUTPATIENT)
Dept: NEPHROLOGY | Facility: CLINIC | Age: 89
End: 2024-07-10
Payer: OTHER GOVERNMENT

## 2024-07-10 DIAGNOSIS — N17.9 AKI (ACUTE KIDNEY INJURY): Primary | ICD-10-CM

## 2024-07-10 NOTE — TELEPHONE ENCOUNTER
Returned call back. Informed pharmacist per provider she stated it was good to change dosage. Pharmacist expressed understanding----- Message from Dayna Olvera PA-C sent at 7/10/2024  9:49 AM CDT -----  Regarding: RE: [facility]  Contact: Bao Rizo that strength is fine  ----- Message -----  From: Sabra Peoples MA  Sent: 7/10/2024   9:32 AM CDT  To: Dayna Olvera PA-C  Subject: FW: [facility]                          Would this strength be good?  ----- Message -----  From: Afshan Chau  Sent: 7/9/2024  12:45 PM CDT  To: [facility] Staff  Subject: [facility]                              .Type:  Pharmacy Calling to Clarify an RX    Name of Caller: Bao Beckett  [facility]   Prescription Name: albuterol (ACCUNEB) 1.25 mg/3 mL Nebu  What do they need to clarify?:  Best Call Back Number: [phone]   Additional Information: [facility] not [facility] only [facility] 2.5 3mg

## 2024-07-11 NOTE — PROGRESS NOTES
"Subjective:    Patient ID:  Zach Lund is a 89 y.o. male who presents for evaluation of Establish Care      HPI: Mr. Zach Lund presents with his son to the clinic in a motorized wheelchair for evaluation of lower extremity edema. he stated that he has chronic lower extremity edema for which he takes bumex. For weight gain or increased edema he takes two tablets in morning and one tablet in evening.  On July 9th, he took 2 mg of Bumex in the morning.  On July 10th he took 2 mg in the morning and 1 mg in the afternoon.  He stated that after taking Bumex he will urinate frequently for the duration of the day.  He does wake up twice during the night for urination.  He feels like recently his lower extremity edema has gotten worse.  He also reports chronic dyspnea on exertion, but at baseline.  He is eating salty foods daily.  He is sedentary.  He lays in bed most of the day or he is up in his chair.  He does not or walk.  Swelling in his left leg is worse than the right and he does have a wound that is being treated to the left tibial area.  He is awaiting venous and arterial ultrasounds of his legs.  That will be done next week.  He denied any chest pain or shortness of breath at rest.  He denied any palpitations, lightheadedness, dizziness, or near syncope.  He denied any fever or chills.  He denied orthopnea or PND.    BNP on July 3, 2024 is 436; no previous for comparison.  He did have a pro NT BNP in 2020 that was a 1090 (for someone greater than 75 years old it should be less than 1800).    He did not take Bumex today because of multiple medical appointments today.      Last visit with Chel Rocha NP on July 3, 2024:   Cardiac/Vascular      Heart failure with preserved ejection fraction     Overview       Chronic. Control uncertain. Followed by cardiology, Dr. Hill. Currently taking Bumex 2 mg in the morning and 1 after lunch. He was on lasix in the past but states medication was "too strong". Hx " of CKD (followed by nephrology). He does have fluctuating weight and swelling to BLE. Chronic dyspnea on exertion. No recent echo for review. Last Echo 11/9/2020 the left ventricular ejection fraction is 60-65% by visual estimation.           Current Assessment & Plan       Labs today. Will check BNP. Discussed may need updated echo. Recommend patient closely follow up with cardiology and nephrology for further evaluation and management of his diuretics. Continue to monitor weight. ER precautions.            Relevant Orders     B-TYPE NATRIURETIC PEPTIDE          Renal/     Stage 4 chronic kidney disease     Overview       Chronic. Followed by nephrology. He is on bumex.         Swelling of lower extremity         Relevant Orders    US Lower Extremity Veins Bilateral Insuf    Comprehensive Metabolic Panel    CBC Auto Differential    B-TYPE NATRIURETIC PEPTIDE    Pain in both lower extremities        Relevant Orders    US Lower Extrem Arteries Bilat with AKBAR (xpd)       Last visit with Dr. Hill on May 2, 2024:   Pt to establish care - moved from [place]  h/o HTN, afib, CKD, BPH, DM   BP at home 160s-170s  Patient denies CP, angina or anginal equivalent.  11/9/2020 Echo: The left ventricular ejection fraction is 60-65% by visual estimation.   Simple chronic bronchitis   Atrial fibrillation, unspecified type   Arteriosclerosis of coronary artery   Atherosclerosis of aorta   Dyslipidemia   Essential hypertension   Heart failure with preserved ejection fraction, unspecified HF chronicity   Sinus bradycardia   Stage 3b chronic kidney disease   Obstructive sleep apnea syndrome   BP diary  Continue coreg, diuretics- HTN/CHF  Continue eliquis- PAF    Last visit with Dr. Troncoso on 6/10/24:  sCr 1.4 in 2023 then 1.8 Jan 2024, 1.9 April and now 2.3mg/dL   Progressive CKD with macroalbuminuria   - he appears to be currently at Stage 4  - discussed stages of CKD, discussed tips on kidney health, emphasized low  sodium diet and increase fruits and vegetables, challenging somewhat as his meals are set at [facility] living and he craves sodium   - for treatment continue Jardiance and Ozempic    Will check kidney function along with MBD labs with PTH for next visit. I suspect he will need D analogue   Has F/U appointment on 9/11/24.           Medications: he is not missing any doses.  Sodium: he does not add salt to foods at the table, but he does use tony cacchere's occasionally; crystal hot sauce. Tortilla chips with salsa.  he is not reading labels for sodium content. he does eat salty foods  Diet: strawberries and grapes; grapefruit; eggs, martinez, coffee, orange juice; tea and lemonade mix.  He had a roast beef sandwich with gravy.  Stuffed seafood potato; sausage.  He lives in [facility].  So far they have not been able to get low-sodium diet  Exercise: he does not  Tobacco: former smoker   Alcohol: no alcohol use  Lives in [facility].     Weight: 95.8 kg (211 lb 3.2 oz) he states that his daily weights has been stableBody mass index is 30.3 kg/m².   Wt Readings from Last 3 Encounters:   07/12/24 95.8 kg (211 lb 3.2 oz)   07/03/24 95.7 kg (211 lb)   06/28/24 97.1 kg (214 lb)     BP log:  None.      Review of Systems   Constitutional: Negative for chills, decreased appetite, fever, night sweats, weight gain and weight loss.   HENT:  Negative for congestion.    Cardiovascular:  Positive for dyspnea on exertion and leg swelling. Negative for chest pain, claudication, cyanosis, irregular heartbeat, near-syncope, orthopnea, palpitations, paroxysmal nocturnal dyspnea and syncope.   Respiratory:  Negative for cough, hemoptysis, shortness of breath, sputum production and wheezing.    Hematologic/Lymphatic: Negative for adenopathy and bleeding problem. Does not bruise/bleed easily.   Skin:  Negative for color change and nail changes.   Gastrointestinal:  Negative for bloating, abdominal pain, change  in bowel habit, heartburn, hematochezia, melena, nausea and vomiting.   Genitourinary:  Negative for hematuria.   Neurological:  Negative for dizziness and light-headedness.   Psychiatric/Behavioral:  Negative for altered mental status.        Objective:   Physical Exam  Constitutional:       General: He is not in acute distress.     Appearance: He is well-developed. He is obese. He is not diaphoretic.   HENT:      Head: Normocephalic and atraumatic.   Eyes:      General: No scleral icterus.     Conjunctiva/sclera: Conjunctivae normal.   Neck:      Thyroid: No thyromegaly.      Vascular: No carotid bruit, hepatojugular reflux or JVD.      Trachea: No tracheal deviation.   Cardiovascular:      Rate and Rhythm: Normal rate and regular rhythm.      Pulses: Intact distal pulses.      Heart sounds: Normal heart sounds. No murmur heard.     No friction rub. No gallop.   Pulmonary:      Effort: Pulmonary effort is normal. No respiratory distress.      Breath sounds: Normal breath sounds. No stridor. No wheezing, rhonchi or rales.      Comments: He is able to speak in complete sentences without obvious increased work of breathing or SOB.  Chest:      Chest wall: No tenderness.   Abdominal:      General: Bowel sounds are normal. There is no distension.      Palpations: Abdomen is soft. There is no mass.      Tenderness: There is no abdominal tenderness. There is no guarding or rebound.   Musculoskeletal:         General: Normal range of motion.      Cervical back: Neck supple.      Right lower leg: Edema (trace pretibial edema to RLE; brown hyperpigmentation noted.) present.      Left lower leg: Edema (1-2+ edema to LLE; Weeping wound (serous fluid) noted to pretibial area. No erythema, heat or tenderness noted around the wound. He does have brown hyperpigmentation to LLE..) present.   Lymphadenopathy:      Cervical: No cervical adenopathy.   Skin:     General: Skin is warm and dry.      Capillary Refill: Capillary refill  takes less than 2 seconds.      Coloration: Skin is not pale.      Findings: No erythema or rash.      Comments: [place]   Neurological:      Mental Status: He is alert and oriented to person, place, and time.   Psychiatric:         Mood and Affect: Mood normal.        Latest Reference Range & Units 05/23/24 12:33 07/03/24 14:45   Sodium 136 - 145 mmol/L 143 139   Potassium 3.5 - 5.1 mmol/L 4.8 4.7   Chloride 95 - 110 mmol/L 116 (H) 102   CO2 23 - 29 mmol/L 22 (L) 26   Anion Gap 8 - 16 mmol/L 5 (L) 11   BUN 8 - 23 mg/dL 34 (H) 51 (H)   Creatinine 0.5 - 1.4 mg/dL 2.3 (H) 3.1 (H)   eGFR >60 mL/min/1.73 m^2 26.3 ! 18.4 !   Glucose 70 - 110 mg/dL 108 223 (H)   Calcium 8.7 - 10.5 mg/dL 8.2 (L) 7.8 (L)   ALP 55 - 135 U/L  108   PROTEIN TOTAL 6.0 - 8.4 g/dL  6.3   Albumin 3.5 - 5.2 g/dL  2.5 (L)   BILIRUBIN TOTAL 0.1 - 1.0 mg/dL  0.3   AST 10 - 40 U/L  25   ALT 10 - 44 U/L  24   BNP 0 - 99 pg/mL  436 (H)   Hemoglobin A1C External 4.0 - 5.6 % 6.8 (H)    Estimated Avg Glucose 68 - 131 mg/dL 148 (H)    (H): Data is abnormally high  (L): Data is abnormally low  !: Data is abnormal         Assessment:      1. Chronic heart failure with preserved ejection fraction    2. Atrial fibrillation, unspecified type    3. Simple chronic bronchitis    4. Arteriosclerosis of coronary artery    5. Atherosclerosis of aorta    6. Dyslipidemia    7. Essential hypertension    8. Sinus bradycardia    9. CKD (chronic kidney disease) stage 4, GFR 15-29 ml/min    10. Obstructive sleep apnea syndrome    11. Diabetic polyneuropathy associated with type 2 diabetes mellitus      Plan:     Chronic heart failure with preserved ejection fraction    Atrial fibrillation, unspecified type    Simple chronic bronchitis    Arteriosclerosis of coronary artery    Atherosclerosis of aorta    Dyslipidemia    Essential hypertension    Sinus bradycardia    CKD (chronic kidney disease) stage 4, GFR 15-29 ml/min    Obstructive sleep apnea syndrome    Diabetic  polyneuropathy associated with type 2 diabetes mellitus      Increase dose of bumex tomorrow. Monitor renal function.   BNP level tends to increase with age, and he has CKD stage 4, so it is possible that this result is around his norm. Much of the peripheral edema is in the LLE. And I don't find other evidence to support worsening of heart failure, such as JVD, Hepatojuglular reflux, or adventitious lung sounds. His renal function may not tolerate pushing diuretic.   Elevate legs when seated.    Compression stockings to the knees bilaterally after vascular studies if no severe arterial disease. He does have in tact DP and PT pulses bilaterally.  Sodium restriction strongly encouraged. Discussed strategies to reduce sodium intake. Orders sent for low sodium diet; put gravies and sauces on the side. Stop Fritz Chachere's; change to Tabasco sauce from Crystal- lower in sodium; stop chips. Read labels for sodium content and choose lowest sodium products. Discussed label reading. Handout given.  Continue carvedilol and apixaban-atrial fibrillation.  Continue carvedilol, empagliflozin, Bumex-diastolic heart failure.  On potassium supplement.  Continue atorvastatin-dyslipidemia.  Follow-up in 1 month for reassessment or call sooner for any problems.  Leidy Chan NP  [facility]     This note has been prepared using a combination of MModaL dictation device and typing.  It has been checked for errors but some errors may still exist within the note as a result of speech recognition errors and/or typographical errors.

## 2024-07-12 ENCOUNTER — HOSPITAL ENCOUNTER (OUTPATIENT)
Dept: RADIOLOGY | Facility: HOSPITAL | Age: 89
Discharge: HOME | End: 2024-07-12
Attending: INTERNAL MEDICINE
Payer: MEDICARE

## 2024-07-12 ENCOUNTER — OFFICE VISIT (OUTPATIENT)
Dept: CARDIOLOGY | Facility: CLINIC | Age: 89
End: 2024-07-12
Payer: MEDICARE

## 2024-07-12 VITALS
OXYGEN SATURATION: 94 % | BODY MASS INDEX: 30.23 KG/M2 | WEIGHT: 211.19 LBS | DIASTOLIC BLOOD PRESSURE: 70 MMHG | HEIGHT: 70 IN | SYSTOLIC BLOOD PRESSURE: 198 MMHG | HEART RATE: 56 BPM

## 2024-07-12 DIAGNOSIS — I50.32 CHRONIC HEART FAILURE WITH PRESERVED EJECTION FRACTION: Primary | ICD-10-CM

## 2024-07-12 DIAGNOSIS — I10 ESSENTIAL HYPERTENSION: ICD-10-CM

## 2024-07-12 DIAGNOSIS — R00.1 SINUS BRADYCARDIA: ICD-10-CM

## 2024-07-12 DIAGNOSIS — N18.4 CKD (CHRONIC KIDNEY DISEASE) STAGE 4, GFR 15-29 ML/MIN: ICD-10-CM

## 2024-07-12 DIAGNOSIS — I25.10 ARTERIOSCLEROSIS OF CORONARY ARTERY: ICD-10-CM

## 2024-07-12 DIAGNOSIS — J41.0 SIMPLE CHRONIC BRONCHITIS: ICD-10-CM

## 2024-07-12 DIAGNOSIS — N17.9 AKI (ACUTE KIDNEY INJURY): ICD-10-CM

## 2024-07-12 DIAGNOSIS — E78.5 DYSLIPIDEMIA: ICD-10-CM

## 2024-07-12 DIAGNOSIS — I70.0 ATHEROSCLEROSIS OF AORTA: ICD-10-CM

## 2024-07-12 DIAGNOSIS — I48.91 ATRIAL FIBRILLATION, UNSPECIFIED TYPE: ICD-10-CM

## 2024-07-12 DIAGNOSIS — E11.42 DIABETIC POLYNEUROPATHY ASSOCIATED WITH TYPE 2 DIABETES MELLITUS: Chronic | ICD-10-CM

## 2024-07-12 DIAGNOSIS — G47.33 OBSTRUCTIVE SLEEP APNEA SYNDROME: ICD-10-CM

## 2024-07-12 PROBLEM — S81.802A WOUND OF LEFT LOWER EXTREMITY: Status: ACTIVE | Noted: 2024-07-03

## 2024-07-12 PROCEDURE — 76770 US EXAM ABDO BACK WALL COMP: CPT | Mod: TC,PO

## 2024-07-12 PROCEDURE — 76770 US EXAM ABDO BACK WALL COMP: CPT | Mod: 26,,, | Performed by: RADIOLOGY

## 2024-07-12 PROCEDURE — 99999 PR PBB SHADOW E&M-EST. PATIENT-LVL V: CPT | Mod: PBBFAC,,, | Performed by: NURSE PRACTITIONER

## 2024-07-12 PROCEDURE — 99215 OFFICE O/P EST HI 40 MIN: CPT | Mod: PBBFAC,25,PO | Performed by: NURSE PRACTITIONER

## 2024-07-12 NOTE — PATIENT INSTRUCTIONS
"Patient Education       Low Salt Diet   About this topic   Sodium is a type of mineral found in many foods. It may also be called "salt." Sodium helps balance fluids in your body. Too much sodium may be bad for your health. You may have to limit the amount of sodium in your food.  Salt is known as sodium chloride. It is measured in grams (g) or milligrams (mg). Salt or sodium in our diet comes from 3 main sources:  Some sodium is naturally found in food.  We may add salt to our food when we eat or cook.  Processed foods give us most of the sodium in our diet.         What will the results be?   This diet may help lower your blood pressure. It may also help reduce extra water in your body. This may help kidney, heart, or liver problems.  What changes to diet are needed?   You need to know how much sodium is in the food you eat. Read food labels with care. Choose foods that have 5% or less sodium in one serving. Remember, if you eat more than one serving, you will be getting more sodium. It may take a while for your sense of taste to get used to food with less sodium. Be patient with yourself. You may be surprised at how well you will do.  Try to aim for a diet that has 2,300 mg (2.3 g) or less sodium in it each day. The Food & Drug Administration (FDA) has set up guidelines for food labels. These will help you make healthy choices. Look for these terms on food packages:  Sodium-free: Less than 5 mg in each serving. These are safe to eat.  Very low sodium: 35 mg of sodium or less in each serving. These are safe to eat.  Low sodium: 140 mg of sodium or less in each serving. You need to eat these with care.  Reduced sodium: At least 25% less sodium than is most often found in each serving. These foods can still be high in sodium.  Light in sodium: 50% less sodium in each serving.  Unsalted, no added salt, and without added salt: No salt is added during processing, but the food may still have sodium. Read the food label " and check the sodium content before eating.  What foods are good to eat?   You can control the amount of sodium in foods you make at home. Fresh foods that you cook are most often lower in sodium.  Regular bread, unsalted crackers, dry cereal, cooked rice, pasta, quinoa, and corn tortillas.  Fresh, frozen, low sodium, or salt-free canned vegetables. Limit vegetable juice or tomato juice to 1/2 cup (120 mL) each day.  Fresh, frozen, canned, or dried fruit without salt added  Nonfat or low-fat milk and yogurt, low-sodium cottage cheese, and other cheeses low in sodium.  Fresh or frozen beef, veal, lamb, pork, poultry, fish, shellfish  Low sodium canned meats, frozen dinners with less than 600 mg sodium  Corn, safflower, sunflower, and soybean oils and unsalted nuts and seeds  Egg and egg substitutes without added sodium  Dried peas, beans, and low-sodium peanut butter  All plain oils and low-sodium salad dressing  Low-sodium broths, soups, soy sauce, condiments, and snack foods  Pepper, herbs, spices, vinegar, lemon or lime juice  Low-sodium carbonated drinks  What foods should be limited or avoided?   Foods that are prepackaged or canned are most often high in sodium. Foods to limit or avoid include:  Salted breads, rolls, crackers, biscuits, cornbread  Quick breads, self-rising flours, biscuit mixes, regular bread crumbs, instant hot cereals  Commercially prepared rice, pasta, or stuffing mixes; potatoes and vegetable mixes  Regular canned vegetables and juices, vegetables with sauce, and pickled vegetables  Frozen vegetables with seasonings and sauces  Processed fruits with salt or sodium  Malted and chocolate milk and buttermilk  Regular and processed cheese and spreads  Smoked, cured, salted, or canned meat, fish, or poultry such as martinez, sausages, sardines, chipped beef, hot dogs, cold cuts, and frozen breaded meats  Salted and canned peas, beans, and olives  Salted snack foods and nuts  Oils mixed with  high-sodium parts such as salad dressing  Meat tenderizers, seasoning salt, and most flavored vinegars  Ketchup, bouillon cubes, salt, sea salt, kosher salt, onion salt, garlic salt, and pink Himalayan salt  What can be done to prevent this health problem?   Check with your doctor before using salt substitutes. Also, check the labels when taking over-the-counter (OTC) drugs such as laxatives and antacids. These can have a high sodium content.  When do I need to call the doctor?   Call your doctor if you have questions about this diet. Talk to a dietitian. They can help you find hidden sources of sodium in the food you eat.  Helpful tips   Use the nutrition facts labels as a guide to look for foods lower in sodium.  Do not use salt when eating or cooking.  Select fresh fruits and vegetables for snacks.  If you are eating out, ask the  to cook your food without salt, or choose foods without sauces.  Season your food with herbs and spices.  Drain and rinse canned beans and vegetables that contain sodium.  Eat foods with potassium. Potassium helps counter the effects of sodium. This may help lower your blood pressure. Foods high in potassium include: Potatoes, tomatoes, bananas, oranges, cantaloupe, beans, and greens.  Where can I learn more?   American Heart Association  https://www.heart.org/en/healthy-living/healthy-eating/eat-smart/sodium/how-to-reduce-sodium   American Heart Association  https://www.heart.org/en/healthy-living/healthy-eating/eat-smart/nutrition-basics/food-packaging-claims   NHS  https://www.nhs.uk/live-well/eat-well/tips-for-a-lower-salt-diet/   UpToDate  http://www.[employer].com/contents/low-sodium-diet-beyond-the-basics   Last Reviewed Date   2021-10-11  Consumer Information Use and Disclaimer   This information is not specific medical advice and does not replace information you receive from your health care provider. This is only a brief summary of general information. It does NOT include  all information about conditions, illnesses, injuries, tests, procedures, treatments, therapies, discharge instructions or life-style choices that may apply to you. You must talk with your health care provider for complete information about your health and treatment options. This information should not be used to decide whether or not to accept your health care providers advice, instructions or recommendations. Only your health care provider has the knowledge and training to provide advice that is right for you.  Copyright   Copyright © 2021 [employer] and its affiliates and/or licensors. All rights reserved.

## 2024-07-14 ENCOUNTER — TELEPHONE (OUTPATIENT)
Dept: NEPHROLOGY | Facility: CLINIC | Age: 89
End: 2024-07-14
Payer: MEDICARE

## 2024-07-14 DIAGNOSIS — N18.4 CKD (CHRONIC KIDNEY DISEASE) STAGE 4, GFR 15-29 ML/MIN: Primary | ICD-10-CM

## 2024-07-14 NOTE — TELEPHONE ENCOUNTER
Pt messaged with providers noting his kidney function worsened. He had more labs and renal u/s. Kidney test results are below--please call pt:    1--kidney function has stabilized, good news  2--he is spilling a very large amount of protein in his urine, which also represents damage from high blood sugars from years ago. This large amount of urine protein is one reason his kidney function is getting worse over time. Treatment for this is Jardiance and Farxiga, but he must stay hydrated on these two medications,   3--his kidney ultrasound looks fine, no cause seen on renal u/s to explain drop in kidney function   3--he must do his best with a low sodium diet, we had discussed at last visit, and Leidy Chan NP, recently discussed as well   4--as pt and his son are aware, finding the ideal dose of Bumex is difficult because too much can worsen kidney function, and too little worsens his edema. Which is one reason why low sodium diet is essential for keeping the edema down     I would like one more blood test to ensure his kidney function remains stable before he leaves for out of town 8/1 (and returns 8/13) a BMP early next week 7/22 or so, order in thank you

## 2024-07-15 ENCOUNTER — PATIENT MESSAGE (OUTPATIENT)
Dept: FAMILY MEDICINE | Facility: CLINIC | Age: 89
End: 2024-07-15
Payer: MEDICARE

## 2024-07-15 ENCOUNTER — HOSPITAL ENCOUNTER (OUTPATIENT)
Dept: RADIOLOGY | Facility: HOSPITAL | Age: 89
Discharge: HOME | End: 2024-07-15
Attending: NURSE PRACTITIONER
Payer: MEDICARE

## 2024-07-15 ENCOUNTER — OFFICE VISIT (OUTPATIENT)
Dept: PULMONOLOGY | Facility: CLINIC | Age: 89
End: 2024-07-15
Attending: PHYSICIAN ASSISTANT

## 2024-07-15 ENCOUNTER — CLINICAL SUPPORT (OUTPATIENT)
Dept: PULMONOLOGY | Facility: CLINIC | Age: 89
End: 2024-07-15
Attending: PHYSICIAN ASSISTANT
Payer: MEDICARE

## 2024-07-15 ENCOUNTER — HOSPITAL ENCOUNTER (OUTPATIENT)
Dept: RADIOLOGY | Facility: HOSPITAL | Age: 89
Discharge: HOME | End: 2024-07-15
Attending: PHYSICIAN ASSISTANT
Payer: MEDICARE

## 2024-07-15 VITALS
RESPIRATION RATE: 21 BRPM | DIASTOLIC BLOOD PRESSURE: 70 MMHG | BODY MASS INDEX: 30.27 KG/M2 | SYSTOLIC BLOOD PRESSURE: 150 MMHG | HEIGHT: 70 IN | HEART RATE: 53 BPM | OXYGEN SATURATION: 94 % | WEIGHT: 211.44 LBS

## 2024-07-15 DIAGNOSIS — M79.89 SWELLING OF LOWER EXTREMITY: ICD-10-CM

## 2024-07-15 DIAGNOSIS — I10 ESSENTIAL HYPERTENSION: ICD-10-CM

## 2024-07-15 DIAGNOSIS — M79.605 PAIN IN BOTH LOWER EXTREMITIES: ICD-10-CM

## 2024-07-15 DIAGNOSIS — J44.9 CHRONIC OBSTRUCTIVE PULMONARY DISEASE, UNSPECIFIED COPD TYPE: Chronic | ICD-10-CM

## 2024-07-15 DIAGNOSIS — E03.9 HYPOTHYROIDISM, UNSPECIFIED TYPE: ICD-10-CM

## 2024-07-15 DIAGNOSIS — G25.81 RESTLESS LEG SYNDROME: ICD-10-CM

## 2024-07-15 DIAGNOSIS — M79.604 PAIN IN BOTH LOWER EXTREMITIES: ICD-10-CM

## 2024-07-15 DIAGNOSIS — I50.32 CHRONIC DIASTOLIC CONGESTIVE HEART FAILURE: ICD-10-CM

## 2024-07-15 DIAGNOSIS — J44.9 COPD MIXED TYPE: Primary | ICD-10-CM

## 2024-07-15 DIAGNOSIS — R91.8 LUNG MASS: ICD-10-CM

## 2024-07-15 LAB
BRPFT: NORMAL
FEF 25 75 LLN: 0.57
FEF 25 75 PRE REF: 11 %
FEF 25 75 REF: 2.28
FEV1 FVC LLN: 57
FEV1 FVC PRE REF: 95 %
FEV1 FVC REF: 73
FEV1 LLN: 1.72
FEV1 PRE REF: 48.3 %
FEV1 REF: 2.59
FVC LLN: 2.51
FVC PRE REF: 50.1 %
FVC REF: 3.58
PEF LLN: 3.55
PEF PRE REF: 82.1 %
PEF REF: 5.87
PRE FEF 25 75: 0.25 L/S
PRE FET 100: 9.78 SEC
PRE FEV1 FVC: 69.79 %
PRE FEV1: 1.25 L
PRE FVC: 1.79 L
PRE PEF: 4.82 L/S

## 2024-07-15 PROCEDURE — 93925 LOWER EXTREMITY STUDY: CPT | Mod: 26,,, | Performed by: STUDENT IN AN ORGANIZED HEALTH CARE EDUCATION/TRAINING PROGRAM

## 2024-07-15 PROCEDURE — 94010 BREATHING CAPACITY TEST: CPT | Mod: 26,S$PBB,, | Performed by: INTERNAL MEDICINE

## 2024-07-15 PROCEDURE — 99999PBSHW PR PBB SHADOW TECHNICAL ONLY FILED TO HB: Mod: PBBFAC,,,

## 2024-07-15 PROCEDURE — 99215 OFFICE O/P EST HI 40 MIN: CPT | Mod: PBBFAC,25 | Performed by: INTERNAL MEDICINE

## 2024-07-15 PROCEDURE — 71046 X-RAY EXAM CHEST 2 VIEWS: CPT | Mod: 26,,, | Performed by: RADIOLOGY

## 2024-07-15 PROCEDURE — 93970 EXTREMITY STUDY: CPT | Mod: 26,,, | Performed by: RADIOLOGY

## 2024-07-15 PROCEDURE — 99214 OFFICE O/P EST MOD 30 MIN: CPT | Mod: 25,S$PBB,, | Performed by: INTERNAL MEDICINE

## 2024-07-15 PROCEDURE — 71046 X-RAY EXAM CHEST 2 VIEWS: CPT | Mod: TC

## 2024-07-15 PROCEDURE — 93970 EXTREMITY STUDY: CPT | Mod: TC

## 2024-07-15 PROCEDURE — 99999 PR PBB SHADOW E&M-EST. PATIENT-LVL V: CPT | Mod: PBBFAC,,, | Performed by: INTERNAL MEDICINE

## 2024-07-15 PROCEDURE — 94010 BREATHING CAPACITY TEST: CPT | Mod: PBBFAC

## 2024-07-15 PROCEDURE — 95012 NITRIC OXIDE EXP GAS DETER: CPT | Mod: PBBFAC

## 2024-07-15 PROCEDURE — 93925 LOWER EXTREMITY STUDY: CPT | Mod: TC

## 2024-07-15 RX ORDER — ALBUTEROL SULFATE 1.25 MG/3ML
1.25 SOLUTION RESPIRATORY (INHALATION) EVERY 6 HOURS PRN
Qty: 75 ML | Refills: 11 | Status: SHIPPED | OUTPATIENT
Start: 2024-07-15 | End: 2025-07-15

## 2024-07-15 RX ORDER — TIOTROPIUM BROMIDE 18 UG/1
18 CAPSULE ORAL; RESPIRATORY (INHALATION) DAILY
Qty: 90 CAPSULE | Refills: 3 | Status: SHIPPED | OUTPATIENT
Start: 2024-07-15 | End: 2025-07-15

## 2024-07-15 RX ORDER — ALBUTEROL SULFATE 90 UG/1
2 AEROSOL, METERED RESPIRATORY (INHALATION) EVERY 4 HOURS PRN
Qty: 18 G | Refills: 11 | Status: SHIPPED | OUTPATIENT
Start: 2024-07-15 | End: 2025-07-15

## 2024-07-15 NOTE — PROCEDURES
Clinical Guide to Interpretation or FeNO Levels :    FeNO  (ppb) LOW INTERMEDIATE HIGH   ADULT VALUES < 25 25-50          > 50   Th2-driven Inflammation Unlikely Likely Significant     Patients FeNO level at this visit : __10__ (ppb)    Interpretation of FeNO measurement in adults:  [x] FENO is less than 25 ppb implies non eosinophilic airway inflammation or the absence of airway inflammation.    Comment: Low FENO (<25 ppb) in adult asthmatics with persistent symptoms suggests other etiologies for these symptoms and a lower likelihood of benefit from adding or increasing inhaled glucocorticoids.    [] FENO between 25 and 50 ppb in adults should be interpreted cautiously with reference to the clinical situation (eg, symptomatic, on or off therapy, current smoking).    [] FENO greater than 50 ppb in adults  suggests eosinophilic airway inflammation   Comment: High FENO (>50 ppb) in adult asthmatics even with atypical symptoms suggests glucocorticoid responsiveness. High FENO (>50 ppb) can help identify poor adherence or uncontrolled inflammation in asthma patients with otherwise seemingly controlled asthma.    Discussion:  A FENO less than 25 ppb in adults and less than 20 ppb in children younger than 12 years of age implies noneosinophilic airway inflammation or the absence of airway inflammation.  A FENO greater than 50 ppb in adults or greater than 35 ppb in children suggests eosinophilic airway inflammation.   Values of FENO between 25 and 50 ppb in adults (20 to 35 ppb in children) should be interpreted cautiously with reference to the clinical situation (eg, symptomatic, on or off therapy, current smoking).  A rising FENO with a greater than 20 percent change and more than 25 ppb (20 ppb in children) from a previously stable level suggests increasing eosinophilic airway inflammation, but there are wide inter-individual differences.  A decrease in FENO greater than 20 percent for values over 50 ppb or more than  10 ppb for values less than 50 ppb may be clinically important.  ?FENO in other respiratory diseases - Several other diseases are associated with altered levels of exhaled NO: low levels of FENO have been noted in cystic fibrosis, current smoking, pulmonary hypertension, hypothermia, primary ciliary dyskinesia, and bronchopulmonary dysplasia. Elevated FENO has been noted in atopy, nonasthmatic eosinophilic bronchitis, COPD exacerbations, noncystic fibrosis bronchiectasis, and viral upper respiratory infections.    REFERENCE:  ATS Board of Directors, December 2004, and by the ERS Executive Committee, June 2004. ATS/ERS Recommendations for Standardized Procedures for the Online and Offline Measurement of Exhaled Lower Respiratory Nitric Oxide and Nasal Nitric Oxide. Guideline 2005

## 2024-07-15 NOTE — TELEPHONE ENCOUNTER
Another call was placed to patient's son Hugh and voicemail was left to check his [employer] portal for details.

## 2024-07-15 NOTE — TELEPHONE ENCOUNTER
Clarification  1--Jardiance and Farxiga are the same medications. He is already on Jardiance, so no need to add Farxiga  2--the microalbumin is the same as the elevated urine protein levels. Albumin is a protein.     No new medications needed at this time. Just the BMP to ensure stable kidney function

## 2024-07-15 NOTE — TELEPHONE ENCOUNTER
Hugh was called back to give clarification from Dr. Troncoso. He was in the doctor's office with his Dad. I will call back later today.

## 2024-07-15 NOTE — PROGRESS NOTES
Pulmonary Outpatient  Visit     Subjective:       Patient ID: Zach Lund is a 89 y.o. male.    Social History     Tobacco Use   Smoking Status Former    Current packs/day: 0.00    Average packs/day: 0.1 packs/day for 45.0 years (2.3 ttl pk-yrs)    Types: Cigarettes    Start date: 1955    Quit date: 2000    Years since quittin.5   Smokeless Tobacco Not on file   Tobacco Comments    never a regular smoker.  would only have one in social situations            Chief Complaint: COPD          Zach Lund is 89 y.o.  New to me  Here with Son  Here to review CXR, Elvis, FeNo and CXR  Son present  [facility]  Lived in [facility] until [facility]  Assisted living Summer field  Was in [employer]:   ? Asbestos exposure  FEV1: 1.25L( 48.3%)  Worked with [employer]: [place]  Spiriva + albu terol  Leg wound on clinda          New patient here for COPD  Here with his son  Lives in assisted living  Takes spiriva daily, albuterol prn  3-5L oxygen prn; patient reports history of claustrophobia;  Has a lot of phlem; takes mucinex  Constant feeling of phlem in throat; this makes him feel panicked and he will put oxygen on and feel better      Tobacco  Smoking status Former (Quit: 2000)  Used Cigarettes  Packs/day 0  Average packs/day 0 packs/day for 45 years  Pack years 2.2  Counseling given? Not Answered  Smokeless status Unknown  Comment never a regular smoker. would only have one in social situations      Moved to [place] from [place] about 2 months ago  Had multiple CXRs in [place]; most recent with possible opacity vs atelectasis? Chest X Ray prior to this with signs of CHF exacerbation which look improved on most recent Chest X Ray  Doing ok today; main concern is constant phlem and cough; white sputum  No chest pain or fever    Sleeps with oxygen  Will not wear CPAP, no recent sleep studie            Review of Systems   All other  systems reviewed and are negative.      Outpatient Encounter Medications as of 7/15/2024   Medication Sig Dispense Refill    albuterol (ACCUNEB) 1.25 mg/3 mL Nebu Take 3 mLs (1.25 mg total) by nebulization every 6 (six) hours as needed (cough, shortness of breath). Rescue 75 mL 11    albuterol (PROVENTIL/VENTOLIN HFA) 90 mcg/actuation inhaler Inhale 2 puffs into the lungs every 4 (four) hours as needed for Wheezing or Shortness of Breath. 18 g 11    apixaban (ELIQUIS) 2.5 mg Tab 2.5 mg 2 (two) times daily.      atorvastatin (LIPITOR) 20 MG tablet 20 mg.      bumetanide (BUMEX) 1 MG tablet Take two tablet early in the morning and one right after lunch.      carvediloL (COREG) 3.125 MG tablet 3.125 mg.      citalopram (CELEXA) 20 MG tablet 20 mg.      [] clindamycin (CLEOCIN) 300 MG capsule Take 1 capsule (300 mg total) by mouth 3 (three) times daily. for 10 days 30 capsule 0    doxazosin (CARDURA) 8 MG Tab Take 8 mg by mouth once daily.      dutasteride (AVODART) 0.5 mg capsule Take 1 capsule by mouth once daily.      empagliflozin (JARDIANCE) 25 mg tablet Take 1 tablet (25 mg total) by mouth once daily. 90 tablet 3    fluocinonide 0.05% (LIDEX) 0.05 % cream APPLY SMALL AMOUNT TOPICALLY TWICE A DAY AS NEEDED ITCHING      levothyroxine (SYNTHROID) 200 MCG tablet Take 1 tablet by mouth once daily.      mupirocin (BACTROBAN) 2 % ointment Apply topically 3 (three) times daily. 30 g 0    potassium chloride (MICRO-K) 10 MEQ CpSR Take 1 capsule (10 mEq total) by mouth once daily. 30 capsule 12    pregabalin (LYRICA) 75 MG capsule Take 1 capsule (75 mg total) by mouth 2 (two) times daily. 60 capsule 12    rOPINIRole (REQUIP) 0.25 MG tablet 0.25 mg.      semaglutide (OZEMPIC) 1 mg/dose (4 mg/3 mL) Inject 1 mg into the skin every 7 days. 9 mL 3    tiotropium (SPIRIVA) 18 mcg inhalation capsule Inhale 1 capsule (18 mcg total) into the lungs once daily. Controller 90 capsule 3    traMADoL (ULTRAM) 50 mg tablet Take 1  tablet (50 mg total) by mouth every 8 (eight) hours as needed for Pain. 12 tablet 0    triamcinolone acetonide 0.1% (KENALOG) 0.1 % cream APPLY SMALL AMOUNT TOPICALLY TWICE A DAY AS NEEDED FOR ITCHING      vitamin D (VITAMIN D3) 1000 units Tab 25 mcg.      [DISCONTINUED] albuterol (ACCUNEB) 1.25 mg/3 mL Nebu Take 3 mLs (1.25 mg total) by nebulization every 6 (six) hours as needed (cough, shortness of breath). Rescue 75 mL 11    [DISCONTINUED] albuterol (PROVENTIL/VENTOLIN HFA) 90 mcg/actuation inhaler Inhale 2 puffs into the lungs every 4 (four) hours as needed.      [DISCONTINUED] empagliflozin (JARDIANCE) 25 mg tablet Take 1 tablet (25 mg total) by mouth once daily. 90 tablet 3    [DISCONTINUED] semaglutide (OZEMPIC) 1 mg/dose (4 mg/3 mL) Inject 1 mg into the skin every 7 days. 9 mL 3    [DISCONTINUED] tiotropium (SPIRIVA) 18 mcg inhalation capsule INHALE 1 CAP BY MOUTH EVERY DAY       No facility-administered encounter medications on file as of 7/15/2024.       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  He does not have any pertinent problems on file.  He  has a past surgical history that includes Prostate  surgery (2017); Eye surgery (2011); Tonsillectomy; and Cholecystectomy (2012).  His family history includes Arthritis in his mother; Cancer in his brother; Mental illness in his father.  He  reports that he quit smoking about 24 years ago. His smoking use included cigarettes. He started smoking about 69 years ago. He has a 2.3 pack-year smoking history. He does not have any smokeless tobacco history on file. He reports that he does not use drugs. No history on file for alcohol use.  He has a current medication list which includes the following prescription(s): albuterol, albuterol, apixaban, atorvastatin, bumetanide, carvedilol, citalopram, doxazosin, dutasteride, empagliflozin, fluocinonide 0.05%, levothyroxine, mupirocin, potassium chloride, pregabalin, ropinirole, semaglutide, tiotropium, tramadol, triamcinolone acetonide 0.1%, and vitamin d.  Current Outpatient Medications on File Prior to Visit   Medication Sig Dispense Refill    apixaban (ELIQUIS) 2.5 mg Tab 2.5 mg 2 (two) times daily.      atorvastatin (LIPITOR) 20 MG tablet 20 mg.      bumetanide (BUMEX) 1 MG tablet Take two tablet early in the morning and one right after lunch.      carvediloL (COREG) 3.125 MG tablet 3.125 mg.      citalopram (CELEXA) 20 MG tablet 20 mg.      doxazosin (CARDURA) 8 MG Tab Take 8 mg by mouth once daily.      dutasteride (AVODART) 0.5 mg capsule Take 1 capsule by mouth once daily.      empagliflozin (JARDIANCE) 25 mg tablet Take 1 tablet (25 mg total) by mouth once daily. 90 tablet 3    fluocinonide 0.05% (LIDEX) 0.05 % cream APPLY SMALL AMOUNT TOPICALLY TWICE A DAY AS NEEDED ITCHING      levothyroxine (SYNTHROID) 200 MCG tablet Take 1 tablet by mouth once daily.      mupirocin (BACTROBAN) 2 % ointment Apply topically 3 (three) times daily. 30 g 0    potassium chloride (MICRO-K) 10 MEQ CpSR Take 1 capsule (10 mEq total) by mouth once daily. 30 capsule 12    pregabalin (LYRICA) 75 MG capsule Take 1 capsule (75 mg total) by  mouth 2 (two) times daily. 60 capsule 12    rOPINIRole (REQUIP) 0.25 MG tablet 0.25 mg.      semaglutide (OZEMPIC) 1 mg/dose (4 mg/3 mL) Inject 1 mg into the skin every 7 days. 9 mL 3    traMADoL (ULTRAM) 50 mg tablet Take 1 tablet (50 mg total) by mouth every 8 (eight) hours as needed for Pain. 12 tablet 0    triamcinolone acetonide 0.1% (KENALOG) 0.1 % cream APPLY SMALL AMOUNT TOPICALLY TWICE A DAY AS NEEDED FOR ITCHING      vitamin D (VITAMIN D3) 1000 units Tab 25 mcg.      [DISCONTINUED] albuterol (ACCUNEB) 1.25 mg/3 mL Nebu Take 3 mLs (1.25 mg total) by nebulization every 6 (six) hours as needed (cough, shortness of breath). Rescue 75 mL 11    [DISCONTINUED] albuterol (PROVENTIL/VENTOLIN HFA) 90 mcg/actuation inhaler Inhale 2 puffs into the lungs every 4 (four) hours as needed.      [DISCONTINUED] tiotropium (SPIRIVA) 18 mcg inhalation capsule INHALE 1 CAP BY MOUTH EVERY DAY       No current facility-administered medications on file prior to visit.     He is allergic to iodinated contrast media, iodine, and lobster..      BP Readings from Last 3 Encounters:   07/15/24 (!) 150/70   07/12/24 (!) 198/70   07/03/24 138/76     Snoring / Sleep:     B       MMRC Dyspnea Scale (4 is worst)     [] MMRC 0: Dyspneic on strenuous excercise (0 points)    [] MMRC 1: Dyspneic on walking a slight hill (0 points)    [] MMRC 2: Dyspneic on walking level ground; must stop occasionally due to breathlessness (1 point)    [] MMRC 3: Must stop for breathlessness after walking 100 yards or after a few minutes (2 points)    [] MMRC 4: Cannot leave house; breathless on dressing/undressing (3 points)              COPD Questionnaire  How often do you cough?: Most of the time  How often do you have phlegm (mucus) in your chest?: All of the time  How often does your chest feel tight?: A little of the time  When you walk up a hill or one flight of stairs, how often are you breathless?: All of the time  How often are you limited doing any  "activities at home?: All of the time  How often are you confident leaving the house despite your lung condition?: Most of the time  How often do you sleep soundly?: Almost never  How often do you have energy?: Never  Total score: 31           No data to display                          Objective:     Vital Signs (Most Recent)  Vital Signs  Pulse: (!) 53  Resp: (!) 21  SpO2: (!) 94 %  BP: (!) 150/70  Height and Weight  Height: 5' 10" (177.8 cm)  Weight: 95.9 kg (211 lb 6.7 oz)  BSA (Calculated - sq m): 2.18 sq meters  BMI (Calculated): 30.3  Weight in (lb) to have BMI = 25: 173.9]  Wt Readings from Last 2 Encounters:   07/15/24 95.9 kg (211 lb 6.7 oz)   07/12/24 95.8 kg (211 lb 3.2 oz)       Physical Exam  Vitals and nursing note reviewed.   Constitutional:       Appearance: Normal appearance.          Comments: Electric chair   HENT:      Head: Normocephalic and atraumatic.      Right Ear: Tympanic membrane normal.      Nose: Nose normal.   Eyes:      Pupils: Pupils are equal, round, and reactive to light.   Cardiovascular:      Rate and Rhythm: Normal rate and regular rhythm.      Pulses: Normal pulses.      Heart sounds: Normal heart sounds.   Pulmonary:      Effort: Pulmonary effort is normal.      Breath sounds: Normal breath sounds.   Abdominal:      General: Bowel sounds are normal.      Palpations: Abdomen is soft.   Musculoskeletal:         General: Normal range of motion.      Cervical back: Normal range of motion.   Skin:     General: Skin is warm and dry.      Capillary Refill: Capillary refill takes less than 2 seconds.   Neurological:      General: No focal deficit present.      Mental Status: He is alert and oriented to person, place, and time.   Psychiatric:         Mood and Affect: Mood normal.          Laboratory  Lab Results   Component Value Date    WBC 7.12 07/03/2024    RBC 3.42 (L) 07/03/2024    HGB 10.2 (L) 07/03/2024    HCT 33.7 (L) 07/03/2024    MCV 99 (H) 07/03/2024    MCH 29.8 07/03/2024 " "   MCHC 30.3 (L) 07/03/2024    RDW 15.0 (H) 07/03/2024     07/03/2024    MPV 12.1 07/03/2024    GRAN 4.2 07/03/2024    GRAN 58.4 07/03/2024    LYMPH 1.9 07/03/2024    LYMPH 26.3 07/03/2024    MONO 0.8 07/03/2024    MONO 11.7 07/03/2024    EOS 0.2 07/03/2024    BASO 0.02 07/03/2024    EOSINOPHIL 2.7 07/03/2024    BASOPHIL 0.3 07/03/2024       BMP  Lab Results   Component Value Date     (L) 07/12/2024    K 4.8 07/12/2024     07/12/2024    CO2 27 07/12/2024    BUN 60 (H) 07/12/2024    CREATININE 2.8 (H) 07/12/2024    CALCIUM 7.9 (L) 07/12/2024    ANIONGAP 7 (L) 07/12/2024    AST 25 07/03/2024    ALT 24 07/03/2024    PROT 6.3 07/03/2024          Lab Results   Component Value Date    IGE <35 04/18/2024        No results found for: "ASPERGILLUS"  No results found for: "AFUMIGATUSCL"     No results found for: "ACE"     Diagnostic Results:  I have personally reviewed today the following studies:    Radiology US Lower Extremity Arteries Bilateral  Narrative: EXAM: US LOWER EXTREMITY ARTERIES BILATERAL    CLINICAL HISTORY: [M79.604]-Pain in right leg./[M79.605]-Pain in left leg.    COMPARISON:  No relevant prior studies.    TECHNIQUE: Real-time sonography bilateral lower extremity arteries with color and spectral Doppler.    FINDINGS:  Limited examination due to restless legs.  Waveforms and peak systolic velocities are as follows (all velocities provided in centimeters per second):    Right lower extremity:  Common femoral artery: 78, triphasic  Deep femoral artery: 53, biphasic  Proximal SFA: 71, triphasic  Mid SFA: 111, triphasic  Distal SFA: 101, triphasic    Popliteal artery: 81, triphasic  Posterior tibial artery: 164, monophasic  Anterior tibial artery: 27, monophasic proximally.  Occluded within its mid and distal portions.  Dorsalis pedis artery: 60, monophasic, supplied through collaterals.    Left lower extremity:  Common femoral artery: 118, triphasic  Deep femoral artery: Not " evaluated  Proximal SFA: 101, triphasic  Mid SFA: 89, triphasic  Distal SFA: 130, triphasic    Popliteal artery: 73, triphasic  Posterior tibial artery: 390, monophasic  Anterior tibial artery: 81, biphasic proximally.  Occluded within its mid and distal portions.  Dorsalis pedis artery: 152, monophasic, supplied through collaterals.  Impression: Occlusion of the mid to distal anterior tibial arteries bilaterally.  Flow present within the bilateral dorsalis pedis arteries, likely through collaterals.  Elevated velocity within the left dorsalis pedis artery may represent additional stenosis within this vessel.    Elevated velocities within the bilateral posterior tibial arteries with monophasic flow, compatible with hemodynamically significant stenosis within these vessels.    Left deep femoral artery was not evaluated.    Report Date: 7/15/2024 4:40 PM    Finalized on: 7/15/2024 4:40 PM By:  Ariadne Garcia MD  [facility]# [id number]      2024-07-15 16:[id number]    [facility]  X-Ray Chest PA And Lateral  Narrative: EXAMINATION:  XR CHEST PA AND LATERAL    CLINICAL HISTORY:  Chronic obstructive pulmonary disease, unspecified    TECHNIQUE:  PA and lateral views of the chest were performed.    COMPARISON:  None available for visual comparison    FINDINGS:  There are areas of scarring versus atelectasis seen within either mid lung zone right greater than the left.  There appear to be old posterior right-sided rib fractures.  No pleural effusion or pneumothorax.  The right hemidiaphragm is elevated with what appears to represent some bowel superimposed between the diaphragm in the liver.  The heart is enlarged.  There is tortuosity of the descending thoracic aorta with calcification of the aortic knob.  Impression: As above    Electronically signed by: Estevan Vo DO  Date:    07/15/2024  Time:    10:35           FENo 10    FEV1: 1.25L(48.3%) FVC 1.79L( 50.1%)  FEV1/FVC 70  Assessment/Plan:     Problem List Items Addressed This  Visit       Essential hypertension    Hypothyroid    Restless leg syndrome    Chronic diastolic congestive heart failure    COPD mixed type - Primary     Continue albuterol and Spiriva   Refill medications sent   Follow-up annually  Nocturnal oxygen 3 liters/minute.         Relevant Medications    albuterol (ACCUNEB) 1.25 mg/3 mL Nebu    albuterol (PROVENTIL/VENTOLIN HFA) 90 mcg/actuation inhaler    tiotropium (SPIRIVA) 18 mcg inhalation capsule    Other Relevant Orders    X-Ray Chest PA And Lateral    Spirometry without Bronchodilator    RESOLVED: Lung mass     Other Visit Diagnoses       Chronic obstructive pulmonary disease, unspecified COPD type  (Chronic)              Reassurance   Continue inhalers          Follow up in about 1 year (around 7/15/2025), or CXR, LUI. MEDS REFILL.    This note was prepared using voice recognition system and is likely to have sound alike errors that may have been overlooked even after proof reading.  Please call me with any questions    Discussed diagnosis, its evaluation, treatment and usual course. All questions answered.    Thank you for the courtesy of participating in the care of this patient    Tomas Tang MD      Personal Diagnostic Review  []  CXR    []  ECHO    []  ONSAT    []  6MWD    []  LABS    []  CHEST CT    []  PET CT    []  Biopsy results

## 2024-07-15 NOTE — TELEPHONE ENCOUNTER
Patient's son Hugh was called and given updates from Dr. Troncoso on elevated protein in urine as well as adding Farxiga to his daily medications. He would like to start this medication asap with Prieto in [place] please.  Also he will have a repeat BMP early next week. Hugh reports his Dad is on a low salt diet and doing well with it.  His main concern now is the excessive Microalbumin and Dr. Mcarthur wants you to be aware.  Please advise when possible.

## 2024-07-16 DIAGNOSIS — I73.9 PERIPHERAL ARTERIAL DISEASE: Primary | ICD-10-CM

## 2024-07-16 NOTE — ASSESSMENT & PLAN NOTE
Continue albuterol and Spiriva   Refill medications sent   Follow-up annually  Nocturnal oxygen 3 liters/minute.

## 2024-07-17 DIAGNOSIS — S81.802A WOUND OF LEFT LOWER EXTREMITY, INITIAL ENCOUNTER: Primary | ICD-10-CM

## 2024-07-18 ENCOUNTER — TELEPHONE (OUTPATIENT)
Dept: CARDIOLOGY | Facility: HOSPITAL | Age: 89
End: 2024-07-18
Payer: MEDICARE

## 2024-07-18 ENCOUNTER — TELEPHONE (OUTPATIENT)
Dept: VASCULAR SURGERY | Facility: CLINIC | Age: 89
End: 2024-07-18
Payer: MEDICARE

## 2024-07-18 NOTE — TELEPHONE ENCOUNTER
Mr. Lund was contacted to schedule an appointment with our vascular surgery group as per referral request from Dr.Brian Mcarthur.  Mr. Lund stated he does not wish to schedule any appointments at this time and has refused the service.  It was communicated to him should he change his mind about scheduling the appointment to contact our office.

## 2024-07-29 ENCOUNTER — PATIENT MESSAGE (OUTPATIENT)
Dept: FAMILY MEDICINE | Facility: CLINIC | Age: 89
End: 2024-07-29
Payer: MEDICARE

## 2024-07-29 DIAGNOSIS — S81.802A WOUND OF LEFT LOWER EXTREMITY, INITIAL ENCOUNTER: ICD-10-CM

## 2024-07-30 RX ORDER — MUPIROCIN 20 MG/G
OINTMENT TOPICAL 3 TIMES DAILY
Qty: 30 G | Refills: 0 | Status: SHIPPED | OUTPATIENT
Start: 2024-07-30

## 2024-08-05 ENCOUNTER — TELEPHONE (OUTPATIENT)
Dept: ORTHOPEDICS | Facility: CLINIC | Age: 89
End: 2024-08-05
Payer: MEDICARE

## 2024-08-07 ENCOUNTER — EXTERNAL HOME HEALTH (OUTPATIENT)
Dept: HOME HEALTH SERVICES | Facility: HOSPITAL | Age: 89
End: 2024-08-07
Payer: MEDICARE

## 2024-08-14 ENCOUNTER — PATIENT MESSAGE (OUTPATIENT)
Dept: FAMILY MEDICINE | Facility: CLINIC | Age: 89
End: 2024-08-14
Payer: OTHER GOVERNMENT

## 2024-08-14 ENCOUNTER — TELEPHONE (OUTPATIENT)
Dept: PULMONOLOGY | Facility: CLINIC | Age: 89
End: 2024-08-14
Payer: OTHER GOVERNMENT

## 2024-08-14 ENCOUNTER — PATIENT MESSAGE (OUTPATIENT)
Dept: CARDIOLOGY | Facility: CLINIC | Age: 89
End: 2024-08-14
Payer: OTHER GOVERNMENT

## 2024-08-14 ENCOUNTER — E-VISIT (OUTPATIENT)
Dept: FAMILY MEDICINE | Facility: CLINIC | Age: 89
End: 2024-08-14
Payer: MEDICARE

## 2024-08-14 DIAGNOSIS — U07.1 COVID: Primary | ICD-10-CM

## 2024-08-14 DIAGNOSIS — U07.1 COVID: ICD-10-CM

## 2024-08-14 NOTE — TELEPHONE ENCOUNTER
Returned call to Rosemarie left message on vociemail to return the call and to offer an appt for tomorrow.

## 2024-08-14 NOTE — TELEPHONE ENCOUNTER
----- Message from Lucio Fleming sent at 8/14/2024  2:18 PM CDT -----  Regarding: advice  Contact: JACQUELIN BOX [[id number]]  Type: Needs Medical Advice  Who Called:  [place], [facility]    Symptoms (please be specific):  SOB during night    How long has patient had these symptoms:  2 nights    Pharmacy name and phone #:        [facility] - [facility] [address]  [address] [address]  Phone: [phone] Fax: [phone]      Best Call Back Number: [phone]    Additional Information: Please call to advise.

## 2024-08-14 NOTE — PROGRESS NOTES
Patient ID: Zach Lund is a 89 y.o. male.    Chief Complaint: URI (Entered automatically based on patient selection in IPXI.)    The patient initiated a request through IPXI on 8/14/2024 for evaluation and management with a chief complaint of URI (Entered automatically based on patient selection in IPXI.)     I evaluated the questionnaire submission on 08/14/2024    .  Covid Risk Score   10      [facility] Peq E-Visit Covid    8/14/2024  5:02 PM CDT - Filed by Patient   Do you agree to participate in an E-Visit? Yes   If you have any of the following symptoms, go to your local emergency room or call 911: I acknowledge   Choose the state of your primary residence [place]   What is the main issue you would like addressed today? Positive for Covid   Do you think you might have COVID or the Flu? Yes COVID   Have you tested positive for COVID or Flu? Yes COVID   What symptoms do you have? Cough;  Fatigue;  Headache;  Nasal congestion;  Muscle or body aches;  Runny nose;  Sore throat   When did your symptoms first appear? 8/14/2024   List what you have done or taken to help your symptoms. tested positive, taking tylenol, asked my son for flonase.  seeking medical assistance from you.  please review my conditions and determine which COVID meds you would prescribe.  I may not be a candidate for Paxlovid.  what else is there?   Provide any additional information you feel is important. So far, no fever, or chills.  some trouble breathing.  using o2, spriva, and albuterol nebulizer.  Unable to sleep last night because of breathing difficulty.  My son will be at my home tomm, Aug 15.; as for vitals, I had blood ox at 91% at 12:23, weight is 206.  No thermometer, so cant help there.  not sure if BP is relevant.   Please attach any relevant images or files    Are you able to take your vital signs? No         Encounter Diagnosis   Name Primary?    COVID Yes        No orders of the defined types were placed in this  encounter.     Medications Ordered This Encounter   Medications    molnupiravir 200 mg capsule (EUA)     Sig: Take 4 capsules (800 mg total) by mouth every 12 (twelve) hours. for 5 days     Dispense:  40 capsule     Refill:  0     Order Specific Question:   I have informed patients with partners of childbearing potential to use a reliable method of contraception during therapy and for 3 months after the last dose of molnupiravir. I have informed them molnupiravir may cause fetal harm.     Answer:   Acknowledge        Follow up if symptoms worsen or fail to improve.      E-Visit Time Tracking:    Day 1 Time (in minutes): 7    Total Time (in minutes): 7

## 2024-08-14 NOTE — PATIENT INSTRUCTIONS
Follow up in 1 week (on 8/21/2024), or if symptoms worsen or fail to improve, for Follow-up on condition.     Dear patient,   As a result of recent federal legislation (The Federal Cures Act), you may receive lab or pathology results from your visit in your [facility] account before your physician is able to contact you. Your physician or their representative will relay the results to you with their recommendations at their soonest availability.     If no improvement in symptoms or symptoms worsen, please be advised to call MD, follow-up at clinic and/or go to ER if becomes severe.    Bao Mcarthur M.D.        We Offer TELEHEALTH & Same Day Appointments!   Book your Telehealth appointment with me through my nurse or   Clinic appointments on [facility]!    [address] [place] [address]    Office: [phone]   FAX: [phone]    Check out my Facebook Page and Follow Me at: [email or web address]    Check out my website at [facility] by clicking on: [email or web address]    To Schedule appointments online, go to [facility]: [email or web address]

## 2024-08-15 ENCOUNTER — OFFICE VISIT (OUTPATIENT)
Dept: DIABETES | Facility: CLINIC | Age: 89
End: 2024-08-15
Payer: MEDICARE

## 2024-08-15 DIAGNOSIS — N18.32 TYPE 2 DIABETES MELLITUS WITH STAGE 3B CHRONIC KIDNEY DISEASE, WITH LONG-TERM CURRENT USE OF INSULIN: Primary | Chronic | ICD-10-CM

## 2024-08-15 DIAGNOSIS — I50.32 CHRONIC DIASTOLIC CONGESTIVE HEART FAILURE: ICD-10-CM

## 2024-08-15 DIAGNOSIS — E78.5 DYSLIPIDEMIA: ICD-10-CM

## 2024-08-15 DIAGNOSIS — N18.4 STAGE 4 CHRONIC KIDNEY DISEASE: ICD-10-CM

## 2024-08-15 DIAGNOSIS — E11.22 TYPE 2 DIABETES MELLITUS WITH STAGE 3B CHRONIC KIDNEY DISEASE, WITH LONG-TERM CURRENT USE OF INSULIN: Primary | Chronic | ICD-10-CM

## 2024-08-15 DIAGNOSIS — Z79.4 TYPE 2 DIABETES MELLITUS WITH STAGE 3B CHRONIC KIDNEY DISEASE, WITH LONG-TERM CURRENT USE OF INSULIN: Primary | Chronic | ICD-10-CM

## 2024-08-15 DIAGNOSIS — I10 ESSENTIAL HYPERTENSION: ICD-10-CM

## 2024-08-15 RX ORDER — GLUCAGON INJECTION, SOLUTION 1 MG/.2ML
1 INJECTION, SOLUTION SUBCUTANEOUS
Qty: 2 EACH | Refills: 5 | Status: SHIPPED | OUTPATIENT
Start: 2024-08-15

## 2024-08-15 NOTE — PROGRESS NOTES
Subjective:    Patient ID:  Zach Lund is a 89 y.o. male who presents for evaluation of Follow-up      HPI: Mr. Zach Lund presents with his son to the clinic in a motorized wheelchair for follow up of diastolic heart failure. He denied any chest pain, but he does report orthopnea that he says is chronic and unchanged from the past. He uses oxygen at night and when he naps; he has COPD.  He denied any shortness of breath during the day.  He is post COVID; he stated that the infection was mild and uneventful. He stated that he is feeling better than he has in a long time.  He stated that his leg wounds are healing.  He has [facility] treating that.  Stated that his edema is much better.  And now he is able to stand.  He is trying to get physical therapy back to help him with walking with the assistance of a walker.  He is eating the diet prepared for him at [facility]-they do not have low-sodium diets.  He denied any palpitations, lightheadedness, dizziness, or near syncope.      Record review 11/15/22 at [facility] cardiology  Intolerant to CCB (amlodipine)  Intolerant to hydralazine   Bradycardia on 12.5mg BID of metoprolol that improves off the drug; had been using Coreg 3.125 mg p.o. b.i.d.  Elevated right diaphragm  Asymmetric radial pulse-no subclavian stenosis 11/2017  Has had leg wraps for lymphedema treatment in the past  Has had CHF exacerbations related to poor adherence with Bumex on February 20, 2020, in August 20, 2020  Pericarditis in 2020  Nuclear stress test November 2017-he was noted to be hypertensive; no evidence of ischemia.  Normal wall motion, EF of 62%.    Carotid ultrasound November 14, 2017-no significant carotid artery stenosis seen bilaterally.    Renal ultrasound November 14, 2017-no significant renal artery stenosis seen bilaterally.  There is an increase in distal resistant seen in the flows obtained within the kidneys and renal arteries suggested of parenchymal  involvement.  Event monitor November 2017-patient was asymptomatic throughout there were 52 asymptomatic transmissions sent.  The baseline rhythm was normal sinus rhythm with occasional atrial ectopy and ventricular ectopy.  October 18, 2017 5 beat run of a IVR  October 20, 2000 1710 seconds of atrial fibrillation with a ventricular response rate of approximately 110 bpm.  October 21, 2017 at 1:30 AM 8 beats NSVT versus PSVT with aberrancy  October 22, 2017 6:50 AM 17 B of NSVT at 131 bpm  October 23, 2017 at 9:45 PM 13 beats NSVT at 140 bpm.  October 25, 2017 at 8:20 PM 4 beats NSVT  October 25, 2017 11:40 PM 11 beats a IVR at a rate of 100 bpm  November 1, 2017 at 5:30 PM 4 beats NSVT.  November 6, 2017 at 12:30 AM 4 beats NSVT  November 7, 2017 at 9 PM 4 beats NSVT rate 120 bpm  Abnormal cardiac event monitor consistent with runs of NSVT primarily occurring while sleeping. These episodes were asymptomatic.     MRI March 2018-there is multifocal patchy small vessel disease with evidence of prior infarction in the right frontal lobe deep white matter similar to prior CT on July 20, 2017.  There are no acute findings on diffusion images.  There is generalized cerebral atrophy with associated ventriculomegaly and ex vacuo right lateral ventricular dilatation.  There is bilateral pseudophakia.    ---------------------------------------------------------------------------  Previous visit 7/12/24: evaluation of lower extremity edema. he stated that he has chronic lower extremity edema for which he takes bumex. For weight gain or increased edema he takes two tablets in morning and one tablet in evening.  On July 9th, he took 2 mg of Bumex in the morning.  On July 10th he took 2 mg in the morning and 1 mg in the afternoon.  He stated that after taking Bumex he will urinate frequently for the duration of the day.  He does wake up twice during the night for urination.  He feels like recently his lower extremity edema has  "gotten worse.  He also reports chronic dyspnea on exertion, but at baseline.  He is eating salty foods daily.  He is sedentary.  He lays in bed most of the day or he is up in his chair.  He does not or walk.  Swelling in his left leg is worse than the right and he does have a wound that is being treated to the left tibial area.  He is awaiting venous and arterial ultrasounds of his legs.  That will be done next week.  He denied any chest pain or shortness of breath at rest.  He denied any palpitations, lightheadedness, dizziness, or near syncope.  He denied any fever or chills.  He denied orthopnea or PND.    BNP on July 3, 2024 is 436; no previous for comparison.  He did have a pro NT BNP in 2020 that was a 1090 (for someone greater than 75 years old it should be less than 1800).    He did not take Bumex today because of multiple medical appointments today.      Last visit with Chel Rocha NP on July 3, 2024:   Cardiac/Vascular      Heart failure with preserved ejection fraction     Overview       Chronic. Control uncertain. Followed by cardiology, Dr. Hill. Currently taking Bumex 2 mg in the morning and 1 after lunch. He was on lasix in the past but states medication was "too strong". Hx of CKD (followed by nephrology). He does have fluctuating weight and swelling to BLE. Chronic dyspnea on exertion. No recent echo for review. Last Echo 11/9/2020 the left ventricular ejection fraction is 60-65% by visual estimation.           Current Assessment & Plan       Labs today. Will check BNP. Discussed may need updated echo. Recommend patient closely follow up with cardiology and nephrology for further evaluation and management of his diuretics. Continue to monitor weight. ER precautions.            Relevant Orders     B-TYPE NATRIURETIC PEPTIDE          Renal/     Stage 4 chronic kidney disease     Overview       Chronic. Followed by nephrology. He is on bumex.         Swelling of lower extremity         " Relevant Orders    US Lower Extremity Veins Bilateral Insuf    Comprehensive Metabolic Panel    CBC Auto Differential    B-TYPE NATRIURETIC PEPTIDE    Pain in both lower extremities        Relevant Orders    US Lower Extrem Arteries Bilat with AKBAR (xpd)       Last visit with Dr. Hill on May 2, 2024:   Pt to establish care - moved from [place]  h/o HTN, afib, CKD, BPH, DM   BP at home 160s-170s  Patient denies CP, angina or anginal equivalent.  11/9/2020 Echo: The left ventricular ejection fraction is 60-65% by visual estimation.   Simple chronic bronchitis   Atrial fibrillation, unspecified type   Arteriosclerosis of coronary artery   Atherosclerosis of aorta   Dyslipidemia   Essential hypertension   Heart failure with preserved ejection fraction, unspecified HF chronicity   Sinus bradycardia   Stage 3b chronic kidney disease   Obstructive sleep apnea syndrome   BP diary  Continue coreg, diuretics- HTN/CHF  Continue eliquis- PAF    Last visit with Dr. Troncoso on 6/10/24:  sCr 1.4 in 2023 then 1.8 Jan 2024, 1.9 April and now 2.3mg/dL   Progressive CKD with macroalbuminuria   - he appears to be currently at Stage 4  - discussed stages of CKD, discussed tips on kidney health, emphasized low sodium diet and increase fruits and vegetables, challenging somewhat as his meals are set at assisted living and he craves sodium   - for treatment continue Jardiance and Ozempic    Will check kidney function along with MBD labs with [facility] for next visit. I suspect he will need D analogue   Has F/U appointment on 9/11/24.       Hypertension Medications               bumetanide (BUMEX) 1 MG tablet Take two tablet early in the morning and one right after lunch.    carvediloL (COREG) 3.125 MG tablet 3.125 mg.    doxazosin (CARDURA) 8 MG Tab Take 8 mg by mouth once daily.   CHF: on Carvedilol, bumex, and empagliflozin.    Hyperlipidemia Medications               atorvastatin (LIPITOR) 20 MG tablet 20 mg.        Medications:  he is not missing any doses. He is taking bumex 2 tablets in morning and another tablet in afternoon if needed. He has not needed the second dose in one week. He uses his weight to determine whether or not he needs a 2nd dose in the afternoon.  Sodium: he does not add salt to foods at the table, but the assisted living facility does not offer a low-sodium diet.  he is not reading labels for sodium content. he does eat salty foods  Diet:  Yesterday for breakfast:  Orange juice, coffee, martinez, 2 scrambled eggs, grapefruit, strawberries, and grapes; lunch: Spaghetti and meatballs with 2 small salads.  He uses oil in vinegar on his salads.  Had 2 Arnold Chau drinks that are unsweetened.  Dinner shrimp po boy with French fries and and Arnold Chau unsweetened drink.  Previous visit: strawberries and grapes; grapefruit; eggs, martinez, coffee, orange juice; tea and lemonade mix.  He had a roast beef sandwich with gravy. Stuffed seafood potato; sausage.    Exercise: he does not; he is, however, able to stand now.  Tobacco: former smoker   Alcohol: no alcohol use  Lives in [facility]. They do not offer low sodium diet.     Weight: 89.4 kg (197 lb) he states that his daily weights has been stableBody mass index is 28.27 kg/m².  He stated that his morning weight runs between 197 and 202 lb.  He knows that he needs an additional Bumex when his weight is over 202.  Wt Readings from Last 3 Encounters:   08/29/24 89.4 kg (197 lb)   07/15/24 95.9 kg (211 lb 6.7 oz)   07/12/24 95.8 kg (211 lb 3.2 oz)     BP log:  None.      Review of Systems   Constitutional: Negative for chills, decreased appetite, fever, night sweats, weight gain and weight loss.   HENT:  Negative for congestion.    Cardiovascular:  Positive for orthopnea (he stated this is chronic and unchanged from the past.). Negative for chest pain, claudication, cyanosis, dyspnea on exertion, irregular heartbeat, near-syncope, palpitations, paroxysmal  nocturnal dyspnea and syncope. Leg swelling: Significantly improved.  Respiratory:  Negative for cough, hemoptysis, shortness of breath, sputum production and wheezing.    Hematologic/Lymphatic: Negative for adenopathy and bleeding problem. Does not bruise/bleed easily.   Skin:  Negative for color change and nail changes.   Gastrointestinal:  Negative for bloating, abdominal pain, change in bowel habit, heartburn, hematochezia, melena, nausea and vomiting.   Genitourinary:  Negative for hematuria.   Neurological:  Negative for dizziness and light-headedness.   Psychiatric/Behavioral:  Negative for altered mental status.        Objective:   Physical Exam  Constitutional:       General: He is not in acute distress.     Appearance: He is well-developed. He is obese. He is not diaphoretic.   HENT:      Head: Normocephalic and atraumatic.   Eyes:      General: No scleral icterus.     Conjunctiva/sclera: Conjunctivae normal.   Neck:      Thyroid: No thyromegaly.      Vascular: No carotid bruit, hepatojugular reflux or JVD.      Trachea: No tracheal deviation.   Cardiovascular:      Rate and Rhythm: Normal rate and regular rhythm.      Pulses: Intact distal pulses.      Heart sounds: Normal heart sounds. No murmur heard.     No friction rub. No gallop.   Pulmonary:      Effort: Pulmonary effort is normal. No respiratory distress.      Breath sounds: Normal breath sounds. No stridor. No wheezing, rhonchi or rales.      Comments: He is able to speak in complete sentences without obvious increased work of breathing or SOB.  Chest:      Chest wall: No tenderness.   Abdominal:      General: Bowel sounds are normal. There is no distension.      Palpations: Abdomen is soft. There is no mass.      Tenderness: There is no abdominal tenderness. There is no guarding or rebound.   Musculoskeletal:         General: Normal range of motion.      Cervical back: Neck supple.      Right lower leg: No edema (Brown hyperpigmentation noted.   He also has a dry dressing to a wound there.).      Left lower leg: No edema (He does have brown hyperpigmentation to LLE.  He does have a dry dressing to the wound there.).   Lymphadenopathy:      Cervical: No cervical adenopathy.   Skin:     General: Skin is warm and dry.      Capillary Refill: Capillary refill takes less than 2 seconds.      Coloration: Skin is not pale.      Findings: No erythema or rash.      Comments: [place]   Neurological:      Mental Status: He is alert and oriented to person, place, and time.   Psychiatric:         Mood and Affect: Mood normal.        Latest Reference Range & Units 07/12/24 10:06   Sodium 136 - 145 mmol/L 135 (L)   Potassium 3.5 - 5.1 mmol/L 4.8   Chloride 95 - 110 mmol/L 101   CO2 23 - 29 mmol/L 27   Anion Gap 8 - 16 mmol/L 7 (L)   BUN 8 - 23 mg/dL 60 (H)   Creatinine 0.5 - 1.4 mg/dL 2.8 (H)   eGFR >60 mL/min/1.73 m^2 20.8 !   Glucose 70 - 110 mg/dL 194 (H)   Calcium 8.7 - 10.5 mg/dL 7.9 (L)   Phosphorus Level 2.7 - 4.5 mg/dL 4.9 (H)   Albumin 3.5 - 5.2 g/dL 2.6 (L)   (L): Data is abnormally low  (H): Data is abnormally high  !: Data is abnormal       Latest Reference Range & Units 05/23/24 12:33 07/03/24 14:45   Sodium 136 - 145 mmol/L 143 139   Potassium 3.5 - 5.1 mmol/L 4.8 4.7   Chloride 95 - 110 mmol/L 116 (H) 102   CO2 23 - 29 mmol/L 22 (L) 26   Anion Gap 8 - 16 mmol/L 5 (L) 11   BUN 8 - 23 mg/dL 34 (H) 51 (H)   Creatinine 0.5 - 1.4 mg/dL 2.3 (H) 3.1 (H)   eGFR >60 mL/min/1.73 m^2 26.3 ! 18.4 !   Glucose 70 - 110 mg/dL 108 223 (H)   Calcium 8.7 - 10.5 mg/dL 8.2 (L) 7.8 (L)   ALP 55 - 135 U/L  108   PROTEIN TOTAL 6.0 - 8.4 g/dL  6.3   Albumin 3.5 - 5.2 g/dL  2.5 (L)   BILIRUBIN TOTAL 0.1 - 1.0 mg/dL  0.3   AST 10 - 40 U/L  25   ALT 10 - 44 U/L  24   BNP 0 - 99 pg/mL  436 (H)   Hemoglobin A1C External 4.0 - 5.6 % 6.8 (H)    Estimated Avg Glucose 68 - 131 mg/dL 148 (H)    (H): Data is abnormally high  (L): Data is abnormally low  !: Data is abnormal    Echo done at  [facility] on 11/9/2020:  Limited study to evaluate hemodynamics and IVC.  PA pressure of 28.8 and RA pressure of 3.  EF 60-65%.    There is mild mitral annular calcification.  There is trace MR.    Mild aortic sclerosis with preserved opening.    Trace tricuspid regurgitation.    Small anterior echo-free space consistent with fat pad.  Pleural effusion present.    Echocardiogram done at [facility] on August 20, 2020:   Atrial fibrillation throughout the study.  EF 60%.    Evidence of increased left atrial pressure.    Right ventricle is dilated at the base only which may reflect RA enlargement/TV annulus dilatation.  The right ventricular function is grossly normal.    Left atrium is mildly dilated.  Mild mitral annular calcification.  Physiologic mitral regurgitation is noted.    Mild aortic sclerosis with preserved opening.  Trace aortic regurgitation.    Physiologic tricuspid regurgitation is noted           Assessment:      1. Chronic heart failure with preserved ejection fraction    2. Atrial fibrillation, unspecified type    3. Simple chronic bronchitis    4. Arteriosclerosis of coronary artery    5. Atherosclerosis of aorta    6. Dyslipidemia    7. Essential hypertension    8. Sinus bradycardia    9. CKD (chronic kidney disease) stage 4, GFR 15-29 ml/min    10. Obstructive sleep apnea syndrome    11. Diabetic polyneuropathy associated with type 2 diabetes mellitus    12. Type 2 diabetes mellitus with stage 4 chronic kidney disease, without long-term current use of insulin      Plan:     Chronic heart failure with preserved ejection fraction  -     Echo; Future    Atrial fibrillation, unspecified type    Simple chronic bronchitis    Arteriosclerosis of coronary artery    Atherosclerosis of aorta    Dyslipidemia    Essential hypertension    Sinus bradycardia    CKD (chronic kidney disease) stage 4, GFR 15-29 ml/min    Obstructive sleep apnea syndrome    Diabetic  polyneuropathy associated with type 2 diabetes mellitus    Type 2 diabetes mellitus with stage 4 chronic kidney disease, without long-term current use of insulin      Chronic heart failure with preserved EF, stage C functional class 2.    Echocardiogram complete to evaluate LV function.  Elevate legs when seated.  Volume status appears euvolemic.  Continue Bumex.  Elevate legs when seated.  Sodium restriction strongly encouraged.   Continue carvedilol and apixaban-atrial fibrillation.  Continue carvedilol, empagliflozin, Bumex-diastolic heart failure.  On potassium supplement. Monitor potassium level-he has CKD4.  Continue atorvastatin-dyslipidemia.  Activity as tolerated. Encouraged walking with supervision with walker. Fall precautions discussed.  Follow-up with Dr. Hill as planned on November 26, 2024 or call sooner for any problems.  Leidy Chan NP  [facility]     This note has been prepared using a combination of MModaL dictation device and typing.  It has been checked for errors but some errors may still exist within the note as a result of speech recognition errors and/or typographical errors.

## 2024-08-15 NOTE — PATIENT INSTRUCTIONS
PATIENT INSTRUCTIONS     Start Lantus 5 units subcutaneously daily.   Continue Jardiance 25 mg by mouth daily.   Continue Ozempic 1 mg subcutaneously every Friday.     Continue Dexcom G7 CGM  Blood Sugar Goals:       Fastin-180.       1-2 hours after a meal: Less than 200.     GVOKE Zo ordered for severe hypoglycemia emergencies - patient instructions provided with AVS for use and administration.  Use this link to watch instructional video on GVOKE - [email or web address]

## 2024-08-15 NOTE — PROGRESS NOTES
The patient location is: Home  The chief complaint leading to consultation is: Diabetes    Visit type: audiovisual    Face to Face time with patient: 15  30 minutes of total time spent on the encounter, which includes face to face time and non-face to face time preparing to see the patient (eg, review of tests), Obtaining and/or reviewing separately obtained history, Documenting clinical information in the electronic or other health record, Independently interpreting results (not separately reported) and communicating results to the patient/family/caregiver, or Care coordination (not separately reported).  Each patient to whom he or she provides medical services by telemedicine is:  (1) informed of the relationship between the physician and patient and the respective role of any other health care provider with respect to management of the patient; and (2) notified that he or she may decline to receive medical services by telemedicine and may withdraw from such care at any time.    Notes:    Zach Lund is a 89 y.o. male who presents for a follow up evaluation of Type 2 diabetes mellitus.     CHIEF COMPLAINT: Diabetes Consultation    PCP: Bao Mcarthur MD      Initial visit with me - 5/23/2024    The patient was initially diagnosed with diabetes in 2017.   Patient from [place], recently moved to [place] with son. He is living at [facility]. Presents today with his son, Hugh, who is very involved in patient's care. Does not have nursing services. Patient is w/c bound currently due to fall in March, fracturing left knee. [facility]      Previous failed treatments include:  Trulicity - switched to ozempic.   Metformin  Glipizide     Social Documentation:  Patient lives in [place] at [facility].   Occupation: retired.   Exercise: none      Diabetes related complications:   nephropathy, retinopathy, and  "cardiovascular disease.   denies Pancreatitis  denies Gastroparesis  denies DKA  denies Hx/family Hx of MEN2/MTC  denies Frequent UTIs/yeast infections     Cardiovascular Risk Factors: advanced age (>55 for men, >65 for women), dyslipidemia, hypertension, male gender, obesity (BMI >30 kg/m2), and sedentary lifestyle.    Diabetes Medications               empagliflozin (JARDIANCE) 25 mg tablet Take 1 tablet (25 mg total) by mouth once daily.    semaglutide (OZEMPIC) 1 mg/dose (4 mg/3 mL) Inject 1 mg into the skin every 7 days.     Current monitoring regimen:  G7 CGM    The patient's Dexcom CGM was downloaded and reviewed. For 14 days, patient average glucose was 209 mg/dL. He was above range 70% of the time, in range 30% of the time, and below range 0% of the time. The target range for this patient was 70 - 180 mg/dL. Overall, there was a pattern of hyperglycemia.          Patient currently taking insulin or sulfonylurea?  NO  Recent hypoglycemic episodes: No.     Patient compliant with glucose checks and medication administration? Yes    DIABETES MANAGEMENT STATUS  Statin: Taking  ACE/ARB: Not taking  Screening or Prevention Patient's value Goal Complete/Controlled?   HgA1C Testing and Control   Lab Results   Component Value Date    HGBA1C 6.8 (H) 05/23/2024      Annually/Less than 8% Yes   Lipid profile : 01/15/2024 Annually No   LDL control No results found for: "LDLCALC" Annually/Less than 100 mg/dl  No   Nephropathy screening Lab Results   Component Value Date    LABMICR 2360.0 07/12/2024     No results found for: "PROTEINUA"  No results found for: "UTPCR"   Annually No   Blood pressure BP Readings from Last 1 Encounters:   07/15/24 (!) 150/70    Less than 140/90 No   Dilated retinal exam Most Recent Eye Exam Date: Not Found Annually No   Foot exam   Most Recent Foot Exam Date: Not Found Annually No   Patient's medications, allergies, surgical, social and family histories were reviewed and updated as " "appropriate.     Review of Systems   Constitutional:  Negative for weight loss.   Eyes:  Negative for blurred vision and double vision.   Cardiovascular:  Negative for chest pain.   Gastrointestinal:  Negative for nausea and vomiting.   Genitourinary:  Negative for frequency.   Musculoskeletal:  Negative for falls.   Neurological:  Negative for dizziness and weakness.   Endo/Heme/Allergies:  Negative for polydipsia.   Psychiatric/Behavioral:  Negative for depression.    All other systems reviewed and are negative.       Physical Exam  Constitutional:       General: He is not in acute distress.     Appearance: Normal appearance.   Eyes:      Extraocular Movements: Extraocular movements intact.      Pupils: Pupils are equal, round, and reactive to light.   Cardiovascular:      Rate and Rhythm: Normal rate and regular rhythm.      Heart sounds: Normal heart sounds.   Pulmonary:      Effort: Pulmonary effort is normal. No respiratory distress.      Breath sounds: Normal breath sounds.   Musculoskeletal:      Cervical back: Normal range of motion and neck supple.   Neurological:      Mental Status: He is alert and oriented to person, place, and time.   Psychiatric:         Mood and Affect: Mood normal.         Behavior: Behavior normal.        There were no vitals taken for this visit.  Wt Readings from Last 3 Encounters:   07/15/24 95.9 kg (211 lb 6.7 oz)   07/12/24 95.8 kg (211 lb 3.2 oz)   07/03/24 95.7 kg (211 lb)       LAB REVIEW  Lab Results   Component Value Date     (L) 07/12/2024    K 4.8 07/12/2024     07/12/2024    CO2 27 07/12/2024    BUN 60 (H) 07/12/2024    CREATININE 2.8 (H) 07/12/2024    CALCIUM 7.9 (L) 07/12/2024    ANIONGAP 7 (L) 07/12/2024    EGFRNORACEVR 20.8 (A) 07/12/2024     No results found for: "CPEPTIDE", "GLUTAMICACID", "INSLNABS"  Hemoglobin A1C   Date Value Ref Range Status   05/23/2024 6.8 (H) 4.0 - 5.6 % Final     Comment:     ADA Screening Guidelines:  5.7-6.4%  Consistent with " prediabetes  >or=6.5%  Consistent with diabetes    High levels of fetal hemoglobin interfere with the HbA1C  assay. Heterozygous hemoglobin variants (HbS, HgC, etc)do  not significantly interfere with this assay.   However, presence of multiple variants may affect accuracy.          ASSESSMENT    ICD-10-CM ICD-9-CM   1. Type 2 diabetes mellitus with stage 3b chronic kidney disease, with long-term current use of insulin  E11.22 250.40    N18.32 585.3    Z79.4 V58.67   2. Stage 4 chronic kidney disease  N18.4 585.4   3. Essential hypertension  I10 401.9   4. Dyslipidemia  E78.5 272.4   5. Chronic diastolic congestive heart failure  I50.32 428.32     428.0         PLAN  Diagnoses and all orders for this visit:    Type 2 diabetes mellitus with stage 3b chronic kidney disease, with long-term current use of insulin  -     glucagon (GVOKE HYPOPEN 2-PACK) 1 mg/0.2 mL AtIn; Inject 1 mg into the skin as needed (SEVERE HYPOGLYCEMIA).    Stage 4 chronic kidney disease    Essential hypertension    Dyslipidemia    Chronic diastolic congestive heart failure          Reviewed pathophysiology of diabetes, complications related to the disease, importance of annual dilated eye exam and daily foot examination. Explained MOA, SE, dosage of medications. Written instructions given and reviewed with patient and patient verbalizes understanding.     6/13/2024 - doing well on current medication doses. Has not taken Lantus and instructed to not start. He is 63% in range with no hypoglycemia. He will need [facility], however [facility] was denied. Will consult with nephrology to assist, as that community care referral was approved. [facility], and son states they will have appt with [facility] in next few weeks, but will run out of current medications prior to appt.     8/15/2024 - tested positive for covid yesterday.     PATIENT INSTRUCTIONS     Start  Lantus 5 units subcutaneously daily.   Continue Jardiance 25 mg by mouth daily.   Continue Ozempic 1 mg subcutaneously every Friday.     Continue Dexcom G7 CGM  Blood Sugar Goals:       Fastin-180.       1-2 hours after a meal: Less than 200.     GVOKE Hypopen ordered for severe hypoglycemia emergencies - patient instructions provided with AVS for use and administration.  Use this link to watch instructional video on [employer] - https://www.[employer].com/watch?v=F9HbEV44OQV     Follow up in about 3 weeks (around 2024) for Dexcom, Virtual.    Portions of this note were prepared with [employer] voice recognition transcription software. Grammatical errors, including garbled syntax, mangle pronouns, and other bizarre constructions may be attributed to that software system.

## 2024-08-16 ENCOUNTER — TELEPHONE (OUTPATIENT)
Dept: FAMILY MEDICINE | Facility: CLINIC | Age: 89
End: 2024-08-16
Payer: OTHER GOVERNMENT

## 2024-08-16 DIAGNOSIS — U07.1 COVID: Primary | ICD-10-CM

## 2024-08-16 RX ORDER — NIRMATRELVIR AND RITONAVIR 150-100 MG
KIT ORAL
Qty: 20 TABLET | Refills: 0 | Status: SHIPPED | OUTPATIENT
Start: 2024-08-16

## 2024-08-16 NOTE — TELEPHONE ENCOUNTER
----- Message from Katherine Grove sent at 8/15/2024  5:21 PM CDT -----  Type:  Pharmacy Calling to Clarify an RX      [facility]  Prescription Name:molnupiravir 200 mg capsule (EUA)  What do they need to clarify?:over $600 for pt insurance requesting alternative  Best Call Back Number:   Additional Information:

## 2024-08-16 NOTE — TELEPHONE ENCOUNTER
I have signed for the following orders AND/OR meds.  Please call the patient and ask the patient to schedule the testing AND/OR inform about any medications that were sent.      No orders of the defined types were placed in this encounter.      Medications Ordered This Encounter   Medications    nirmatrelvir-ritonavir (PAXLOVID) 150-100 mg DsPk     Sig: Use as directed on pack     Dispense:  20 tablet     Refill:  0

## 2024-08-16 NOTE — TELEPHONE ENCOUNTER
Called and spoke with Oli pt son, stated they spent the $700 on the medication. Oli stated that pt is itching all over after taking medication. I advised oli to give pt benadryl and keep us updated on his symptoms. Verbalized understanding. YESICA

## 2024-08-16 NOTE — TELEPHONE ENCOUNTER
----- Message from Terry Weinstein MA sent at 8/15/2024  4:18 PM CDT -----  Contact: [facility]  Please advised if this is ok  ----- Message -----  From: Deena Rocha  Sent: 8/15/2024   3:36 PM CDT  To: [facility] Staff    .Type:  Pharmacy Calling to Clarify an RX    Name of Caller: Paul  Pharmacy Name: [facility]  Prescription Name: molnupiravir 200 mg capsule (EUA)  What do they need to clarify?: needing to change to Paxlovid due to cost   Best Call Back Number: [phone]  Additional Information:

## 2024-08-19 ENCOUNTER — DOCUMENT SCAN (OUTPATIENT)
Dept: HOME HEALTH SERVICES | Facility: HOSPITAL | Age: 89
End: 2024-08-19
Payer: OTHER GOVERNMENT

## 2024-08-29 ENCOUNTER — OFFICE VISIT (OUTPATIENT)
Dept: CARDIOLOGY | Facility: CLINIC | Age: 89
End: 2024-08-29
Payer: MEDICARE

## 2024-08-29 VITALS
DIASTOLIC BLOOD PRESSURE: 62 MMHG | HEIGHT: 70 IN | BODY MASS INDEX: 28.2 KG/M2 | SYSTOLIC BLOOD PRESSURE: 150 MMHG | HEART RATE: 52 BPM | OXYGEN SATURATION: 96 % | WEIGHT: 197 LBS

## 2024-08-29 DIAGNOSIS — I50.32 CHRONIC HEART FAILURE WITH PRESERVED EJECTION FRACTION: Primary | ICD-10-CM

## 2024-08-29 DIAGNOSIS — I10 ESSENTIAL HYPERTENSION: ICD-10-CM

## 2024-08-29 DIAGNOSIS — J41.0 SIMPLE CHRONIC BRONCHITIS: ICD-10-CM

## 2024-08-29 DIAGNOSIS — I25.10 ARTERIOSCLEROSIS OF CORONARY ARTERY: Chronic | ICD-10-CM

## 2024-08-29 DIAGNOSIS — E11.42 DIABETIC POLYNEUROPATHY ASSOCIATED WITH TYPE 2 DIABETES MELLITUS: ICD-10-CM

## 2024-08-29 DIAGNOSIS — I48.91 ATRIAL FIBRILLATION, UNSPECIFIED TYPE: ICD-10-CM

## 2024-08-29 DIAGNOSIS — E78.5 DYSLIPIDEMIA: ICD-10-CM

## 2024-08-29 DIAGNOSIS — I70.0 ATHEROSCLEROSIS OF AORTA: Chronic | ICD-10-CM

## 2024-08-29 DIAGNOSIS — R00.1 SINUS BRADYCARDIA: ICD-10-CM

## 2024-08-29 DIAGNOSIS — N18.4 CKD (CHRONIC KIDNEY DISEASE) STAGE 4, GFR 15-29 ML/MIN: ICD-10-CM

## 2024-08-29 DIAGNOSIS — G47.33 OBSTRUCTIVE SLEEP APNEA SYNDROME: ICD-10-CM

## 2024-08-29 DIAGNOSIS — E11.22 TYPE 2 DIABETES MELLITUS WITH STAGE 4 CHRONIC KIDNEY DISEASE, WITHOUT LONG-TERM CURRENT USE OF INSULIN: ICD-10-CM

## 2024-08-29 DIAGNOSIS — N18.4 TYPE 2 DIABETES MELLITUS WITH STAGE 4 CHRONIC KIDNEY DISEASE, WITHOUT LONG-TERM CURRENT USE OF INSULIN: ICD-10-CM

## 2024-08-29 PROCEDURE — 99999 PR PBB SHADOW E&M-EST. PATIENT-LVL V: CPT | Mod: PBBFAC,,, | Performed by: NURSE PRACTITIONER

## 2024-08-29 PROCEDURE — 99215 OFFICE O/P EST HI 40 MIN: CPT | Mod: PBBFAC,PO | Performed by: NURSE PRACTITIONER

## 2024-08-29 NOTE — PATIENT INSTRUCTIONS
"Patient Education       Low Salt Diet   About this topic   Sodium is a type of mineral found in many foods. It may also be called "salt." Sodium helps balance fluids in your body. Too much sodium may be bad for your health. You may have to limit the amount of sodium in your food.  Salt is known as sodium chloride. It is measured in grams (g) or milligrams (mg). Salt or sodium in our diet comes from 3 main sources:  Some sodium is naturally found in food.  We may add salt to our food when we eat or cook.  Processed foods give us most of the sodium in our diet.         What will the results be?   This diet may help lower your blood pressure. It may also help reduce extra water in your body. This may help kidney, heart, or liver problems.  What changes to diet are needed?   You need to know how much sodium is in the food you eat. Read food labels with care. Choose foods that have 5% or less sodium in one serving. Remember, if you eat more than one serving, you will be getting more sodium. It may take a while for your sense of taste to get used to food with less sodium. Be patient with yourself. You may be surprised at how well you will do.  Try to aim for a diet that has 2,300 mg (2.3 g) or less sodium in it each day. The Food & Drug Administration (FDA) has set up guidelines for food labels. These will help you make healthy choices. Look for these terms on food packages:  Sodium-free: Less than 5 mg in each serving. These are safe to eat.  Very low sodium: 35 mg of sodium or less in each serving. These are safe to eat.  Low sodium: 140 mg of sodium or less in each serving. You need to eat these with care.  Reduced sodium: At least 25% less sodium than is most often found in each serving. These foods can still be high in sodium.  Light in sodium: 50% less sodium in each serving.  Unsalted, no added salt, and without added salt: No salt is added during processing, but the food may still have sodium. Read the food label " and check the sodium content before eating.  What foods are good to eat?   You can control the amount of sodium in foods you make at home. Fresh foods that you cook are most often lower in sodium.  Regular bread, unsalted crackers, dry cereal, cooked rice, pasta, quinoa, and corn tortillas.  Fresh, frozen, low sodium, or salt-free canned vegetables. Limit vegetable juice or tomato juice to 1/2 cup (120 mL) each day.  Fresh, frozen, canned, or dried fruit without salt added  Nonfat or low-fat milk and yogurt, low-sodium cottage cheese, and other cheeses low in sodium.  Fresh or frozen beef, veal, lamb, pork, poultry, fish, shellfish  Low sodium canned meats, frozen dinners with less than 600 mg sodium  Corn, safflower, sunflower, and soybean oils and unsalted nuts and seeds  Egg and egg substitutes without added sodium  Dried peas, beans, and low-sodium peanut butter  All plain oils and low-sodium salad dressing  Low-sodium broths, soups, soy sauce, condiments, and snack foods  Pepper, herbs, spices, vinegar, lemon or lime juice  Low-sodium carbonated drinks  What foods should be limited or avoided?   Foods that are prepackaged or canned are most often high in sodium. Foods to limit or avoid include:  Salted breads, rolls, crackers, biscuits, cornbread  Quick breads, self-rising flours, biscuit mixes, regular bread crumbs, instant hot cereals  Commercially prepared rice, pasta, or stuffing mixes; potatoes and vegetable mixes  Regular canned vegetables and juices, vegetables with sauce, and pickled vegetables  Frozen vegetables with seasonings and sauces  Processed fruits with salt or sodium  Malted and chocolate milk and buttermilk  Regular and processed cheese and spreads  Smoked, cured, salted, or canned meat, fish, or poultry such as martinez, sausages, sardines, chipped beef, hot dogs, cold cuts, and frozen breaded meats  Salted and canned peas, beans, and olives  Salted snack foods and nuts  Oils mixed with  high-sodium parts such as salad dressing  Meat tenderizers, seasoning salt, and most flavored vinegars  Ketchup, bouillon cubes, salt, sea salt, kosher salt, onion salt, garlic salt, and pink Himalayan salt  What can be done to prevent this health problem?   Check with your doctor before using salt substitutes. Also, check the labels when taking over-the-counter (OTC) drugs such as laxatives and antacids. These can have a high sodium content.  When do I need to call the doctor?   Call your doctor if you have questions about this diet. Talk to a dietitian. They can help you find hidden sources of sodium in the food you eat.  Helpful tips   Use the nutrition facts labels as a guide to look for foods lower in sodium.  Do not use salt when eating or cooking.  Select fresh fruits and vegetables for snacks.  If you are eating out, ask the  to cook your food without salt, or choose foods without sauces.  Season your food with herbs and spices.  Drain and rinse canned beans and vegetables that contain sodium.  Eat foods with potassium. Potassium helps counter the effects of sodium. This may help lower your blood pressure. Foods high in potassium include: Potatoes, tomatoes, bananas, oranges, cantaloupe, beans, and greens.  Where can I learn more?   American Heart Association  https://www.heart.org/en/healthy-living/healthy-eating/eat-smart/sodium/how-to-reduce-sodium   American Heart Association  https://www.heart.org/en/healthy-living/healthy-eating/eat-smart/nutrition-basics/food-packaging-claims   NHS  https://www.nhs.uk/live-well/eat-well/tips-for-a-lower-salt-diet/   UpToDate  http://www.[employer].com/contents/low-sodium-diet-beyond-the-basics   Last Reviewed Date   2021-10-11  Consumer Information Use and Disclaimer   This information is not specific medical advice and does not replace information you receive from your health care provider. This is only a brief summary of general information. It does NOT include  all information about conditions, illnesses, injuries, tests, procedures, treatments, therapies, discharge instructions or life-style choices that may apply to you. You must talk with your health care provider for complete information about your health and treatment options. This information should not be used to decide whether or not to accept your health care providers advice, instructions or recommendations. Only your health care provider has the knowledge and training to provide advice that is right for you.  Copyright   Copyright © 2021 [employer] and its affiliates and/or licensors. All rights reserved.

## 2024-08-30 ENCOUNTER — PATIENT MESSAGE (OUTPATIENT)
Dept: FAMILY MEDICINE | Facility: CLINIC | Age: 89
End: 2024-08-30
Payer: MEDICARE

## 2024-09-03 ENCOUNTER — PATIENT MESSAGE (OUTPATIENT)
Dept: CARDIOLOGY | Facility: CLINIC | Age: 89
End: 2024-09-03
Payer: MEDICARE

## 2024-09-03 ENCOUNTER — LAB VISIT (OUTPATIENT)
Dept: LAB | Facility: HOSPITAL | Age: 89
End: 2024-09-03
Attending: INTERNAL MEDICINE
Payer: MEDICARE

## 2024-09-03 DIAGNOSIS — N18.4 CKD (CHRONIC KIDNEY DISEASE) STAGE 4, GFR 15-29 ML/MIN: ICD-10-CM

## 2024-09-03 DIAGNOSIS — N18.4 CKD (CHRONIC KIDNEY DISEASE) STAGE 4, GFR 15-29 ML/MIN: Primary | ICD-10-CM

## 2024-09-03 LAB
BACTERIA #/AREA URNS AUTO: NORMAL /HPF
MICROSCOPIC COMMENT: NORMAL
RBC #/AREA URNS AUTO: 1 /HPF (ref 0–4)
SQUAMOUS #/AREA URNS AUTO: 1 /HPF
WBC #/AREA URNS AUTO: 2 /HPF (ref 0–5)

## 2024-09-03 PROCEDURE — 81001 URINALYSIS AUTO W/SCOPE: CPT | Performed by: INTERNAL MEDICINE

## 2024-09-05 ENCOUNTER — PATIENT MESSAGE (OUTPATIENT)
Dept: DIABETES | Facility: CLINIC | Age: 89
End: 2024-09-05
Payer: MEDICARE

## 2024-09-07 ENCOUNTER — DOCUMENT SCAN (OUTPATIENT)
Dept: HOME HEALTH SERVICES | Facility: HOSPITAL | Age: 89
End: 2024-09-07
Payer: MEDICARE

## 2024-09-09 ENCOUNTER — HOSPITAL ENCOUNTER (OUTPATIENT)
Dept: CARDIOLOGY | Facility: HOSPITAL | Age: 89
Discharge: HOME | End: 2024-09-09
Attending: NURSE PRACTITIONER
Payer: MEDICARE

## 2024-09-09 VITALS
DIASTOLIC BLOOD PRESSURE: 62 MMHG | BODY MASS INDEX: 28.2 KG/M2 | HEART RATE: 49 BPM | SYSTOLIC BLOOD PRESSURE: 150 MMHG | WEIGHT: 197 LBS | HEIGHT: 70 IN

## 2024-09-09 DIAGNOSIS — I50.32 CHRONIC HEART FAILURE WITH PRESERVED EJECTION FRACTION: ICD-10-CM

## 2024-09-09 LAB
AORTIC ROOT ANNULUS: 3.47 CM
AORTIC VALVE CUSP SEPERATION: 1.59 CM
APICAL FOUR CHAMBER EJECTION FRACTION: 46 %
APICAL TWO CHAMBER EJECTION FRACTION: 53 %
ASCENDING AORTA: 3.73 CM
AV INDEX (PROSTH): 0.85
AV MEAN GRADIENT: 5 MMHG
AV PEAK GRADIENT: 8 MMHG
AV REGURGITATION PRESSURE HALF TIME: 821.94 MS
AV VALVE AREA BY VELOCITY RATIO: 3.03 CM²
AV VALVE AREA: 3.45 CM²
AV VELOCITY RATIO: 0.74
BSA FOR ECHO PROCEDURE: 2.1 M2
CV ECHO LV RWT: 0.52 CM
DOP CALC AO PEAK VEL: 1.4 M/S
DOP CALC AO VTI: 37.1 CM
DOP CALC LVOT AREA: 4.1 CM2
DOP CALC LVOT DIAMETER: 2.28 CM
DOP CALC LVOT PEAK VEL: 1.04 M/S
DOP CALC LVOT STROKE VOLUME: 128.14 CM3
DOP CALC MV VTI: 53.9 CM
DOP CALC RVOT PEAK VEL: 1.23 M/S
DOP CALC RVOT VTI: 31.7 CM
DOP CALCLVOT PEAK VEL VTI: 31.4 CM
E WAVE DECELERATION TIME: 292.17 MSEC
E/A RATIO: 0.9
E/E' RATIO: 17.8 M/S
ECHO LV POSTERIOR WALL: 1.46 CM (ref 0.6–1.1)
EJECTION FRACTION: 60 %
FRACTIONAL SHORTENING: 30 % (ref 28–44)
INTERVENTRICULAR SEPTUM: 1.48 CM (ref 0.6–1.1)
LA MAJOR: 7.13 CM
LA MINOR: 6.85 CM
LA WIDTH: 5.5 CM
LEFT ATRIUM AREA SYSTOLIC (APICAL 2 CHAMBER): 31.99 CM2
LEFT ATRIUM AREA SYSTOLIC (APICAL 4 CHAMBER): 30.4 CM2
LEFT ATRIUM SIZE: 5.22 CM
LEFT ATRIUM VOLUME INDEX MOD: 58.3 ML/M2
LEFT ATRIUM VOLUME INDEX: 82.4 ML/M2
LEFT ATRIUM VOLUME MOD: 120.75 CM3
LEFT ATRIUM VOLUME: 170.51 CM3
LEFT INTERNAL DIMENSION IN SYSTOLE: 3.9 CM (ref 2.1–4)
LEFT VENTRICLE DIASTOLIC VOLUME INDEX: 73.5 ML/M2
LEFT VENTRICLE DIASTOLIC VOLUME: 152.14 ML
LEFT VENTRICLE END DIASTOLIC VOLUME APICAL 2 CHAMBER: 98.66 ML
LEFT VENTRICLE END DIASTOLIC VOLUME APICAL 4 CHAMBER: 134.63 ML
LEFT VENTRICLE END SYSTOLIC VOLUME APICAL 2 CHAMBER: 130.02 ML
LEFT VENTRICLE END SYSTOLIC VOLUME APICAL 4 CHAMBER: 110.83 ML
LEFT VENTRICLE MASS INDEX: 179 G/M2
LEFT VENTRICLE SYSTOLIC VOLUME INDEX: 31.7 ML/M2
LEFT VENTRICLE SYSTOLIC VOLUME: 65.72 ML
LEFT VENTRICULAR INTERNAL DIMENSION IN DIASTOLE: 5.58 CM (ref 3.5–6)
LEFT VENTRICULAR MASS: 370.61 G
LV LATERAL E/E' RATIO: 17.8 M/S
LV SEPTAL E/E' RATIO: 17.8 M/S
LVED V (TEICH): 152.14 ML
LVES V (TEICH): 65.72 ML
LVOT MG: 2.78 MMHG
LVOT MV: 0.81 CM/S
MV A" WAVE DURATION": 156.04 MSEC
MV MEAN GRADIENT: 3 MMHG
MV PEAK A VEL: 0.99 M/S
MV PEAK E VEL: 0.89 M/S
MV PEAK GRADIENT: 6 MMHG
MV STENOSIS PRESSURE HALF TIME: 84.73 MS
MV VALVE AREA BY CONTINUITY EQUATION: 2.38 CM2
MV VALVE AREA P 1/2 METHOD: 2.6 CM2
OHS CV RV/LV RATIO: 0.44 CM
OHS LV EJECTION FRACTION SIMPSONS BIPLANE MOD: 49 %
PISA AR MAX VEL: 3.77 M/S
PISA MRMAX VEL: 5.73 M/S
PISA TR MAX VEL: 2.62 M/S
PULM VEIN S/D RATIO: 1.21
PV MEAN GRADIENT: 4 MMHG
PV MV: 1.06 M/S
PV PEAK D VEL: 0.62 M/S
PV PEAK GRADIENT: 8 MMHG
PV PEAK S VEL: 0.75 M/S
PV PEAK VELOCITY: 1.39 M/S
RA MAJOR: 4.95 CM
RA PRESSURE ESTIMATED: 3 MMHG
RA WIDTH: 4.36 CM
RIGHT VENTRICLE DIASTOLIC BASEL DIMENSION: 4.3 CM
RIGHT VENTRICULAR END-DIASTOLIC DIMENSION: 2.45 CM
RV TB RVSP: 6 MMHG
STJ: 3.85 CM
TDI LATERAL: 0.05 M/S
TDI SEPTAL: 0.05 M/S
TDI: 0.05 M/S
TR MAX PG: 27 MMHG
TRICUSPID ANNULAR PLANE SYSTOLIC EXCURSION: 2.17 CM
TV REST PULMONARY ARTERY PRESSURE: 30 MMHG
Z-SCORE OF LEFT VENTRICULAR DIMENSION IN END DIASTOLE: -1.2
Z-SCORE OF LEFT VENTRICULAR DIMENSION IN END SYSTOLE: 0.1

## 2024-09-09 PROCEDURE — 93306 TTE W/DOPPLER COMPLETE: CPT | Mod: PO

## 2024-09-09 PROCEDURE — 93306 TTE W/DOPPLER COMPLETE: CPT | Mod: 26,,, | Performed by: INTERNAL MEDICINE

## 2024-09-11 ENCOUNTER — PATIENT MESSAGE (OUTPATIENT)
Dept: FAMILY MEDICINE | Facility: CLINIC | Age: 89
End: 2024-09-11
Payer: MEDICARE

## 2024-09-19 ENCOUNTER — OFFICE VISIT (OUTPATIENT)
Dept: DIABETES | Facility: CLINIC | Age: 89
End: 2024-09-19
Payer: MEDICARE

## 2024-09-19 ENCOUNTER — DOCUMENT SCAN (OUTPATIENT)
Dept: HOME HEALTH SERVICES | Facility: HOSPITAL | Age: 89
End: 2024-09-19
Payer: MEDICARE

## 2024-09-19 DIAGNOSIS — E11.22 TYPE 2 DIABETES MELLITUS WITH STAGE 3B CHRONIC KIDNEY DISEASE, WITH LONG-TERM CURRENT USE OF INSULIN: Primary | ICD-10-CM

## 2024-09-19 DIAGNOSIS — N18.32 TYPE 2 DIABETES MELLITUS WITH STAGE 3B CHRONIC KIDNEY DISEASE, WITH LONG-TERM CURRENT USE OF INSULIN: Primary | ICD-10-CM

## 2024-09-19 DIAGNOSIS — Z79.4 TYPE 2 DIABETES MELLITUS WITH STAGE 3B CHRONIC KIDNEY DISEASE, WITH LONG-TERM CURRENT USE OF INSULIN: Primary | ICD-10-CM

## 2024-09-19 NOTE — PATIENT INSTRUCTIONS
PATIENT INSTRUCTIONS     Continue Lantus 5 units subcutaneously daily.   Continue Jardiance 25 mg by mouth daily.   Continue Ozempic 1 mg subcutaneously every Friday.     Continue Dexcom G7 CGM  Blood Sugar Goals:       Fastin-180.       1-2 hours after a meal: Less than 200.

## 2024-09-19 NOTE — PROGRESS NOTES
The patient location is: Home  The chief complaint leading to consultation is: Diabetes    Visit type: audiovisual    Face to Face time with patient: 15  30 minutes of total time spent on the encounter, which includes face to face time and non-face to face time preparing to see the patient (eg, review of tests), Obtaining and/or reviewing separately obtained history, Documenting clinical information in the electronic or other health record, Independently interpreting results (not separately reported) and communicating results to the patient/family/caregiver, or Care coordination (not separately reported).  Each patient to whom he or she provides medical services by telemedicine is:  (1) informed of the relationship between the physician and patient and the respective role of any other health care provider with respect to management of the patient; and (2) notified that he or she may decline to receive medical services by telemedicine and may withdraw from such care at any time.    Notes:    Zach Lund is a 89 y.o. male who presents for a follow up evaluation of Type 2 diabetes mellitus.     CHIEF COMPLAINT: Diabetes Consultation    PCP: Bao Mcarthur MD      Initial visit with me - 5/23/2024    The patient was initially diagnosed with diabetes in 2017.   Patient from [place], recently moved to [place] with son. He is living at [facility]. Presents today with his son, Hugh, who is very involved in patient's care. Does not have nursing services. Patient is w/c bound currently due to fall in March, fracturing left knee. [facility]      Previous failed treatments include:  Trulicity - switched to ozempic.   Metformin  Glipizide     Social Documentation:  Patient lives in [place] at [facility].   Occupation: retired.   Exercise: none      Diabetes related complications:   nephropathy, retinopathy, and  "cardiovascular disease.   denies Pancreatitis  denies Gastroparesis  denies DKA  denies Hx/family Hx of MEN2/MTC  denies Frequent UTIs/yeast infections     Cardiovascular Risk Factors: advanced age (>55 for men, >65 for women), dyslipidemia, hypertension, male gender, obesity (BMI >30 kg/m2), and sedentary lifestyle.    Diabetes Medications               empagliflozin (JARDIANCE) 25 mg tablet Take 1 tablet (25 mg total) by mouth once daily.    glucagon (GVOKE HYPOPEN 2-PACK) 1 mg/0.2 mL AtIn Inject 1 mg into the skin as needed (SEVERE HYPOGLYCEMIA).    semaglutide (OZEMPIC) 1 mg/dose (4 mg/3 mL) Inject 1 mg into the skin every 7 days.     Current monitoring regimen:  G7 CGM    The patient's Dexcom CGM was downloaded and reviewed. For 14 days, patient average glucose was 218 mg/dL. He was above range 59% of the time, in range 41% of the time, and below range 0% of the time. The target range for this patient was 70 - 180 mg/dL. Overall, there was a pattern of hyperglycemia overnight. Snacking at night on fruit and chips.          Patient currently taking insulin or sulfonylurea?  NO  Recent hypoglycemic episodes: No.     Patient compliant with glucose checks and medication administration? Yes    DIABETES MANAGEMENT STATUS  Statin: Taking  ACE/ARB: Not taking  Screening or Prevention Patient's value Goal Complete/Controlled?   HgA1C Testing and Control   Lab Results   Component Value Date    HGBA1C 6.8 (H) 05/23/2024      Annually/Less than 8% Yes   Lipid profile : 01/15/2024 Annually No   LDL control No results found for: "LDLCALC" Annually/Less than 100 mg/dl  No   Nephropathy screening Lab Results   Component Value Date    LABMICR 2360.0 07/12/2024     No results found for: "PROTEINUA"  No results found for: "UTPCR"   Annually No   Blood pressure BP Readings from Last 1 Encounters:   09/09/24 (!) 150/62    Less than 140/90 No   Dilated retinal exam Most Recent Eye Exam Date: Not Found Annually No   Foot exam   Most " "Recent Foot Exam Date: Not Found Annually No   Patient's medications, allergies, surgical, social and family histories were reviewed and updated as appropriate.     Review of Systems   Constitutional:  Negative for weight loss.   Eyes:  Negative for blurred vision and double vision.   Cardiovascular:  Negative for chest pain.   Gastrointestinal:  Negative for nausea and vomiting.   Genitourinary:  Negative for frequency.   Musculoskeletal:  Negative for falls.   Neurological:  Negative for dizziness and weakness.   Endo/Heme/Allergies:  Negative for polydipsia.   Psychiatric/Behavioral:  Negative for depression.    All other systems reviewed and are negative.       Physical Exam  Constitutional:       General: He is not in acute distress.     Appearance: Normal appearance.   Eyes:      Extraocular Movements: Extraocular movements intact.      Pupils: Pupils are equal, round, and reactive to light.   Cardiovascular:      Rate and Rhythm: Normal rate and regular rhythm.      Heart sounds: Normal heart sounds.   Pulmonary:      Effort: Pulmonary effort is normal. No respiratory distress.      Breath sounds: Normal breath sounds.   Musculoskeletal:      Cervical back: Normal range of motion and neck supple.   Neurological:      Mental Status: He is alert and oriented to person, place, and time.   Psychiatric:         Mood and Affect: Mood normal.         Behavior: Behavior normal.      There were no vitals taken for this visit.  Wt Readings from Last 3 Encounters:   09/09/24 89.4 kg (197 lb)   08/29/24 89.4 kg (197 lb)   07/15/24 95.9 kg (211 lb 6.7 oz)       LAB REVIEW  Lab Results   Component Value Date     08/29/2024    K 5.5 (H) 08/29/2024     08/29/2024    CO2 24 08/29/2024    BUN 55 (H) 08/29/2024    CREATININE 3.0 (H) 08/29/2024    CALCIUM 8.3 (L) 08/29/2024    ANIONGAP 9 08/29/2024    EGFRNORACEVR 19.1 (A) 08/29/2024     No results found for: "CPEPTIDE", "GLUTAMICACID", "INSLNABS"  Hemoglobin A1C "   Date Value Ref Range Status   2024 6.8 (H) 4.0 - 5.6 % Final     Comment:     ADA Screening Guidelines:  5.7-6.4%  Consistent with prediabetes  >or=6.5%  Consistent with diabetes    High levels of fetal hemoglobin interfere with the HbA1C  assay. Heterozygous hemoglobin variants (HbS, HgC, etc)do  not significantly interfere with this assay.   However, presence of multiple variants may affect accuracy.          ASSESSMENT    ICD-10-CM ICD-9-CM   1. Type 2 diabetes mellitus with stage 3b chronic kidney disease, with long-term current use of insulin  E11.22 250.40    N18.32 585.3    Z79.4 V58.67           PLAN  Diagnoses and all orders for this visit:    Type 2 diabetes mellitus with stage 3b chronic kidney disease, with long-term current use of insulin            Reviewed pathophysiology of diabetes, complications related to the disease, importance of annual dilated eye exam and daily foot examination. Explained MOA, SE, dosage of medications. Written instructions given and reviewed with patient and patient verbalizes understanding.     2024 - doing well on current medication doses. Has not taken Lantus and instructed to not start. He is 63% in range with no hypoglycemia. He will need [facility], however [facility] was denied. Will consult with nephrology to assist, as that community care referral was approved. [facility], and son states they will have appt with [facility] in next few weeks, but will run out of current medications prior to appt.     8/15/2024 - tested positive for covid yesterday.       PATIENT INSTRUCTIONS     Continue Lantus 5 units subcutaneously daily.   Continue Jardiance 25 mg by mouth daily.   Continue Ozempic 1 mg subcutaneously every Friday.     Continue Dexcom G7 CGM  Blood Sugar Goals:       Fastin-180.       1-2 hours after a meal: Less than 200.       Follow up in about 6 weeks  (around 10/31/2024) for Virtual, Dexcom.    Portions of this note were prepared with [employer] Naturally Speaking voice recognition transcription software. Grammatical errors, including garbled syntax, mangle pronouns, and other bizarre constructions may be attributed to that software system.

## 2024-09-21 ENCOUNTER — PATIENT MESSAGE (OUTPATIENT)
Dept: FAMILY MEDICINE | Facility: CLINIC | Age: 89
End: 2024-09-21
Payer: MEDICARE

## 2024-09-25 ENCOUNTER — DOCUMENT SCAN (OUTPATIENT)
Dept: HOME HEALTH SERVICES | Facility: HOSPITAL | Age: 89
End: 2024-09-25
Payer: MEDICARE

## 2024-09-27 ENCOUNTER — PATIENT MESSAGE (OUTPATIENT)
Dept: ADMINISTRATIVE | Facility: HOSPITAL | Age: 89
End: 2024-09-27
Payer: MEDICARE

## 2024-09-27 ENCOUNTER — PATIENT OUTREACH (OUTPATIENT)
Dept: ADMINISTRATIVE | Facility: HOSPITAL | Age: 89
End: 2024-09-27
Payer: MEDICARE

## 2024-09-27 NOTE — LETTER
[id number]    AUTHORIZATION FOR RELEASE OF   CONFIDENTIAL INFORMATION    Dear dr. Palafox,    We are seeing Zach Lund, date of birth 1933, in the clinic at [facility]. Bao Mcarthur MD is the patient's PCP. Zach Lund has an outstanding lab/procedure at the time we reviewed his chart. In order to help keep his health information updated, he has authorized us to request the following medical record(s):             ( x ) MOST RECENT DIABETIC EYE EXAM                  Please fax records to [facility], [phone]     If you have any questions, please contact    BOB Callahan at [phone]          Patient Name: Zach Lund  : 1933  Patient Phone #: [phone]

## 2024-09-27 NOTE — PROGRESS NOTES
Replying to Campaign Questionnaire for Overdue HM: DM Eye Exam    BEATRICE sent to Dr. Palma Palafox 1x to request dm eye exam. Reminder set.

## 2024-09-30 ENCOUNTER — PATIENT MESSAGE (OUTPATIENT)
Dept: FAMILY MEDICINE | Facility: CLINIC | Age: 89
End: 2024-09-30
Payer: MEDICARE

## 2024-09-30 DIAGNOSIS — G62.9 NEUROPATHY: Primary | ICD-10-CM

## 2024-09-30 RX ORDER — PREGABALIN 25 MG/1
25 CAPSULE ORAL 2 TIMES DAILY
Qty: 60 CAPSULE | Refills: 0 | Status: SHIPPED | OUTPATIENT
Start: 2024-09-30 | End: 2025-03-31

## 2024-09-30 RX ORDER — PREGABALIN 75 MG/1
75 CAPSULE ORAL 2 TIMES DAILY
Qty: 60 CAPSULE | Refills: 0 | Status: SHIPPED | OUTPATIENT
Start: 2024-09-30 | End: 2024-09-30

## 2024-09-30 NOTE — TELEPHONE ENCOUNTER
This has been done and printed. Please let patient know it is available for . Please verify he wanted this printed and not electronically sent.

## 2024-09-30 NOTE — TELEPHONE ENCOUNTER
No care due was identified.  [facility] Embedded Care Due Messages. Reference number: [id number].   9/30/2024 9:09:52 AM CDT

## 2024-09-30 NOTE — TELEPHONE ENCOUNTER
I have signed for the following orders AND/OR meds.  Please call the patient and ask the patient to schedule the testing AND/OR inform about any medications that were sent.    Medications Ordered This Encounter   Medications    pregabalin (LYRICA) 25 MG capsule     Sig: Take 1 capsule (25 mg total) by mouth 2 (two) times daily.     Dispense:  60 capsule     Refill:  0

## 2024-10-01 ENCOUNTER — PATIENT MESSAGE (OUTPATIENT)
Dept: NEPHROLOGY | Facility: CLINIC | Age: 89
End: 2024-10-01

## 2024-10-01 ENCOUNTER — HOSPITAL ENCOUNTER (OUTPATIENT)
Dept: RADIOLOGY | Facility: HOSPITAL | Age: 89
Discharge: HOME | End: 2024-10-01
Attending: INTERNAL MEDICINE
Payer: OTHER GOVERNMENT

## 2024-10-01 ENCOUNTER — OFFICE VISIT (OUTPATIENT)
Dept: NEPHROLOGY | Facility: CLINIC | Age: 89
End: 2024-10-01
Payer: OTHER GOVERNMENT

## 2024-10-01 VITALS — SYSTOLIC BLOOD PRESSURE: 162 MMHG | HEART RATE: 65 BPM | OXYGEN SATURATION: 99 % | DIASTOLIC BLOOD PRESSURE: 80 MMHG

## 2024-10-01 DIAGNOSIS — E11.22 TYPE 2 DIABETES MELLITUS WITH STAGE 4 CHRONIC KIDNEY DISEASE, WITH LONG-TERM CURRENT USE OF INSULIN: ICD-10-CM

## 2024-10-01 DIAGNOSIS — R06.02 SOB (SHORTNESS OF BREATH): ICD-10-CM

## 2024-10-01 DIAGNOSIS — N18.4 TYPE 2 DIABETES MELLITUS WITH STAGE 4 CHRONIC KIDNEY DISEASE, WITH LONG-TERM CURRENT USE OF INSULIN: ICD-10-CM

## 2024-10-01 DIAGNOSIS — E13.3399: ICD-10-CM

## 2024-10-01 DIAGNOSIS — I50.32 CHRONIC HEART FAILURE WITH PRESERVED EJECTION FRACTION: ICD-10-CM

## 2024-10-01 DIAGNOSIS — N18.4 STAGE 4 CHRONIC KIDNEY DISEASE: Primary | ICD-10-CM

## 2024-10-01 DIAGNOSIS — N25.81 SECONDARY HYPERPARATHYROIDISM OF RENAL ORIGIN: ICD-10-CM

## 2024-10-01 DIAGNOSIS — Z79.4 TYPE 2 DIABETES MELLITUS WITH STAGE 4 CHRONIC KIDNEY DISEASE, WITH LONG-TERM CURRENT USE OF INSULIN: ICD-10-CM

## 2024-10-01 DIAGNOSIS — R80.9 PROTEINURIA, UNSPECIFIED TYPE: ICD-10-CM

## 2024-10-01 DIAGNOSIS — E55.9 VITAMIN D DEFICIENCY: ICD-10-CM

## 2024-10-01 PROCEDURE — 99999 PR PBB SHADOW E&M-EST. PATIENT-LVL IV: CPT | Mod: PBBFAC,,, | Performed by: INTERNAL MEDICINE

## 2024-10-01 PROCEDURE — 99215 OFFICE O/P EST HI 40 MIN: CPT | Mod: S$PBB,,, | Performed by: INTERNAL MEDICINE

## 2024-10-01 PROCEDURE — 99214 OFFICE O/P EST MOD 30 MIN: CPT | Mod: PBBFAC,25,PO | Performed by: INTERNAL MEDICINE

## 2024-10-01 PROCEDURE — 71046 X-RAY EXAM CHEST 2 VIEWS: CPT | Mod: 26,,, | Performed by: STUDENT IN AN ORGANIZED HEALTH CARE EDUCATION/TRAINING PROGRAM

## 2024-10-01 PROCEDURE — 71046 X-RAY EXAM CHEST 2 VIEWS: CPT | Mod: TC,PO

## 2024-10-01 PROCEDURE — G2211 COMPLEX E/M VISIT ADD ON: HCPCS | Mod: S$PBB,,, | Performed by: INTERNAL MEDICINE

## 2024-10-01 RX ORDER — CALCITRIOL 0.25 UG/1
0.25 CAPSULE ORAL WEEKLY
Qty: 12 CAPSULE | Refills: 3 | Status: SHIPPED | OUTPATIENT
Start: 2024-10-01

## 2024-10-01 RX ORDER — CALCIUM CARB, CITRATE, MALATE 250 MG
30 CAPSULE ORAL DAILY
Qty: 90 CAPSULE | Refills: 3 | Status: SHIPPED | OUTPATIENT
Start: 2024-10-01

## 2024-10-01 NOTE — PROGRESS NOTES
Subjective:      Patient ID: Zach Lund is a 89 y.o. White male who presents for follow up evaluation of Chronic Kidney Disease    HPI    June 2024:  Referred by PCP for CKD  Lives at [place]   From [place], saw Nephrology in the past, was told 'wasn't good but not very bad either' and that the heart and kidneys work against each other, medications  Saw Cards in the past for CHF, on Bumex for LE edema, and BP uncontrolled   sCr 1.4 in 2023 then 1.8 Jan 2024, 1.9 April and now 2.3mg/dL 750mcg albuminuria in 2023  On Jardiance and Ozempic,  On Bumex 2-4 per day pending volume status   April fall--still not walking   Off Lantus since on Jardiance seeing Endocrine soon   Likes salt   No NSAIDs, only Tylenol or tramadol     Oct 2024: Lyrica withdrawal, had change in cognition, with nausea and vomiting, resumed yesterday and already feeling better, and improving. Ate breakfast this am and feels fine, but cognition still a little off.  Daily weights. COVID one month ago. Stopped KCl      Review of Systems   HENT:  Negative for facial swelling.    Cardiovascular:  Positive for leg swelling.   Gastrointestinal:  Positive for nausea and vomiting.   Musculoskeletal:  Positive for arthralgias.   Allergic/Immunologic: Positive for immunocompromised state.   Psychiatric/Behavioral:  Positive for confusion.       Objective:     Physical Exam  Vitals and nursing note reviewed.   Constitutional:       General: He is not in acute distress.     Appearance: He is not ill-appearing.      Comments: In motorized w/c    Cardiovascular:      Rate and Rhythm: Normal rate and regular rhythm.   Pulmonary:      Breath sounds: No wheezing (decreased lung sounds on R lung) or rales.   Musculoskeletal:      Right lower leg: Edema present.      Left lower leg: Edema present.   Skin:     General: Skin is warm and dry.   Neurological:      Mental Status: He is alert.   Psychiatric:         Mood and Affect: Mood normal.       Assessment:     1.  Stage 4 chronic kidney disease    2. SOB (shortness of breath)    3. Proteinuria, unspecified type    4. Chronic heart failure with preserved ejection fraction    5. Secondary hyperparathyroidism of renal origin    6. Type 2 diabetes mellitus with stage 4 chronic kidney disease, with long-term current use of insulin    7. Vitamin D deficiency    8. 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, without macular edema, with moderate nonproliferative retinopathy         Plan:     Initial Consult  Progressive CKD with macroalbuminuria   - he appears to be currently at Stage 4  - discussed stages of CKD, discussed tips on kidney health, emphasized low sodium diet and increase fruits and vegetables, challenging somewhat as his meals are set at assisted living and he craves sodium   - for treatment continue Jardiance and Ozempic     Oct 2024:  - expected drop in GFR from SGLT2i and stable, will improve with time  - Stage 4 CKD, check chemistries today due to recent n/v  - stop calcium, already stopped K+. Add zinc and recheck mag prior to supplementing. Recheck potassium today   - add calcitriol weekly  - recent n/v thus no recent Bumex, continue daily weights, low sodium diet   - check CXR given decreased RLL on exam       RTC 3mo (end of Dec) then 2months after Dec appt with labs again   45 minutes of total time spent on the encounter, which includes face to face time and non-face to face time preparing to see the patient (eg, review of tests), Obtaining and/or reviewing separately obtained history, Documenting clinical information in the electronic or other health record, Independently interpreting results (not separately reported) and communicating results to the patient/family/caregiver, or Care coordination (not separately reported).  Visit today included increased complexity associated with the care of the episodic problem addressed and/or managing the longitudinal care of the patient due to the serious and/or  complex managed problem(s) as per above diagnosis list.

## 2024-10-01 NOTE — PATIENT INSTRUCTIONS
Chest xray today to check for fluid around left lung  Check another magnesium level today  Check potassium level today  Check kidney function today since recent nausea and vomiting     Next visit at end of December

## 2024-10-02 ENCOUNTER — TELEPHONE (OUTPATIENT)
Dept: FAMILY MEDICINE | Facility: CLINIC | Age: 89
End: 2024-10-02
Payer: OTHER GOVERNMENT

## 2024-10-02 NOTE — TELEPHONE ENCOUNTER
----- Message from Tiffanie sent at 10/2/2024  2:16 PM CDT -----  Contact: Aracelis Hsu with [facility] is calling to speak with the nurse regarding form. Reports needing a original signed form of the patients certificate of mobility and impairment. Please give Aracelis a call back at [phone] or fax [phone]

## 2024-10-08 ENCOUNTER — TELEPHONE (OUTPATIENT)
Dept: FAMILY MEDICINE | Facility: CLINIC | Age: 89
End: 2024-10-08
Payer: OTHER GOVERNMENT

## 2024-10-08 NOTE — TELEPHONE ENCOUNTER
----- Message from Estrella sent at 10/8/2024 12:46 PM CDT -----  Contact: Rosemarie/[facility]  Type:  Needs Medical Advice    Who Called: Rosemarie  Symptoms (please be specific): Lump on shoulder/Squishy/Moves  How long has patient had these symptoms:  Unknown  Pharmacy name and phone #:    [facility] [address] [facility], [place] - [address]  [address]  [facility] [address]  Phone: [phone] Fax: [phone]  Would the patient rather a call back or a response via [facility]? call  Best Call Back Number: [phone]  Additional Information: Reports patient has a lump on shoulder that is not hard and moves.   Thank you,  GH

## 2024-10-09 ENCOUNTER — EXTERNAL HOME HEALTH (OUTPATIENT)
Dept: HOME HEALTH SERVICES | Facility: HOSPITAL | Age: 89
End: 2024-10-09
Payer: OTHER GOVERNMENT

## 2024-10-10 ENCOUNTER — PATIENT MESSAGE (OUTPATIENT)
Dept: FAMILY MEDICINE | Facility: CLINIC | Age: 89
End: 2024-10-10
Payer: OTHER GOVERNMENT

## 2024-10-10 ENCOUNTER — TELEPHONE (OUTPATIENT)
Dept: FAMILY MEDICINE | Facility: CLINIC | Age: 89
End: 2024-10-10
Payer: OTHER GOVERNMENT

## 2024-10-10 ENCOUNTER — DOCUMENT SCAN (OUTPATIENT)
Dept: HOME HEALTH SERVICES | Facility: HOSPITAL | Age: 89
End: 2024-10-10
Payer: OTHER GOVERNMENT

## 2024-10-15 ENCOUNTER — PATIENT MESSAGE (OUTPATIENT)
Dept: PULMONOLOGY | Facility: CLINIC | Age: 89
End: 2024-10-15
Payer: OTHER GOVERNMENT

## 2024-10-16 ENCOUNTER — PATIENT MESSAGE (OUTPATIENT)
Dept: NEPHROLOGY | Facility: CLINIC | Age: 89
End: 2024-10-16
Payer: OTHER GOVERNMENT

## 2024-10-17 DIAGNOSIS — J44.9 COPD MIXED TYPE: Primary | ICD-10-CM

## 2024-10-17 RX ORDER — CALCIUM CARB, CITRATE, MALATE 250 MG
30 CAPSULE ORAL DAILY
Qty: 90 CAPSULE | Refills: 3 | Status: SHIPPED | OUTPATIENT
Start: 2024-10-17

## 2024-10-17 NOTE — TELEPHONE ENCOUNTER
Patients son was contacted via phone. Son spoke with provider regarding why patient needed order. Patients son agreed to doing o2 device. Staff scheduled device  and appointment for provider to review results and send order.

## 2024-10-17 NOTE — TELEPHONE ENCOUNTER
----- Message from Alicia sent at 10/17/2024 10:48 AM CDT -----  Regarding: Refill request  [facility]  Type:  [facility] Refill Request    [facility]    RX Name and Strength:zinc glycinate 30mg caps    Local or Mail Order:local    Ordering Provider:lynn    Would the patient rather a call back or a response via [facility]? Call back    Best Call Back Number:[facility]    [facility]    Additional Information: [facility] not [facility] glycinate 30mg caps

## 2024-10-17 NOTE — TELEPHONE ENCOUNTER
[facility] cannot fill Zinc Glycinate 30 mg cap. Please reorder from [facility]

## 2024-10-23 NOTE — PROGRESS NOTES
The patient location is: [facility]  The chief complaint leading to consultation is:   Chief Complaint   Patient presents with    Paperwork     O2 [facility]        Visit type: audiovisual    Face to Face time with patient: 10 min  30 minutes of total time spent on the encounter, which includes face to face time and non-face to face time preparing to see the patient (eg, review of tests), Obtaining and/or reviewing separately obtained history, Documenting clinical information in the electronic or other health record, Independently interpreting results (not separately reported) and communicating results to the patient/family/caregiver, or Care coordination (not separately reported).         Each patient to whom he or she provides medical services by telemedicine is:  (1) informed of the relationship between the physician and patient and the respective role of any other health care provider with respect to management of the patient; and (2) notified that he or she may decline to receive medical services by telemedicine and may withdraw from such care at any time.    Notes:                                             Pulmonary Outpatient  Visit     Subjective:       Patient ID: Zach Lund is a 89 y.o. male.    Social History     Tobacco Use   Smoking Status Former    Current packs/day: 0.00    Average packs/day: 0.1 packs/day for 45.0 years (2.3 ttl pk-yrs)    Types: Cigarettes    Start date: 1955    Quit date: 2000    Years since quittin.8   Smokeless Tobacco Not on file   Tobacco Comments    never a regular smoker.  would only have one in social situations            Chief Complaint: Paperwork (O2 [facility])          Zach Lund is 89 y.o.  New to me  Here with Son  Here to review CXR, Elvis, FeNo and CXR  Son present  [facility]  Lived in [facility] until 2024  Assisted living Summer field  Was in [employer]:   ? Asbestos exposure  FEV1: 1.25L( 48.3%)  Worked with [employer]:  [employer]  Spiriva + albu terol  Leg wound on clinda          New patient here for COPD  Here with his son  Lives in assisted living  Takes spiriva daily, albuterol prn  3-5L oxygen prn; patient reports history of claustrophobia;  Has a lot of phlem; takes mucinex  Constant feeling of phlem in throat; this makes him feel panicked and he will put oxygen on and feel better      Tobacco  Smoking status Former (Quit: 01/01/2000)  Used Cigarettes  Packs/day 0  Average packs/day 0 packs/day for 45 years  Pack years 2.2  Counseling given? Not Answered  Smokeless status Unknown  Comment never a regular smoker. would only have one in social situations      Moved to [place] from [place] about 2 months ago  Had multiple CXRs in [place]; most recent with possible opacity vs atelectasis? Chest X Ray prior to this with signs of CHF exacerbation which look improved on most recent Chest X Ray  Doing ok today; main concern is constant phlem and cough; white sputum  No chest pain or fever    Sleeps with oxygen  Will not wear CPAP, no recent sleep studie      10/24/2024   Follow-up visit  Has HH   Son on video, Hugh  On RA  Needs O2 order filled  Last O2 certification 10/31/2018  Documentation   From Visit:10/31/2018 Dr Phillip York DO  Oxygen at rest was 97% on room air, oxygen with ambulation through out the hallway in office reduced to 88% on room air, increased while ambulating throughout the hallway to 95% on 2 LPM of oxygen with exercise in clinic today:      Spiriva and Albuterol   Advised to get updated ONSAT            Review of Systems   Constitutional: Negative.    All other systems reviewed and are negative.      Outpatient Encounter Medications as of 10/24/2024   Medication Sig Dispense Refill    albuterol (ACCUNEB) 1.25 mg/3 mL Nebu Take 3 mLs (1.25 mg total) by nebulization every 6 (six) hours as needed (cough, shortness of breath). Rescue 75 mL 11    apixaban (ELIQUIS) 2.5 mg Tab 2.5 mg 2 (two) times daily.       atorvastatin (LIPITOR) 20 MG tablet 20 mg.      bumetanide (BUMEX) 1 MG tablet Take 1 mg by mouth daily as needed.      calcitRIOL (ROCALTROL) 0.25 MCG Cap Take 1 capsule (0.25 mcg total) by mouth once a week. Active vitamin D 12 capsule 3    carvediloL (COREG) 3.125 MG tablet 3.125 mg once daily.      citalopram (CELEXA) 20 MG tablet 20 mg.      doxazosin (CARDURA) 8 MG Tab Take 8 mg by mouth once daily.      dutasteride (AVODART) 0.5 mg capsule Take 1 capsule by mouth once daily.      empagliflozin (JARDIANCE) 25 mg tablet Take 1 tablet (25 mg total) by mouth once daily. 90 tablet 3    fluocinonide 0.05% (LIDEX) 0.05 % cream APPLY SMALL AMOUNT TOPICALLY TWICE A DAY AS NEEDED ITCHING      glucagon (GVOKE HYPOPEN 2-PACK) 1 mg/0.2 mL AtIn Inject 1 mg into the skin as needed (SEVERE HYPOGLYCEMIA). 2 each 5    guaifenesin/phenylephrine HCl (MUCINEX COLD ORAL) Take 400 mg by mouth.      levothyroxine (SYNTHROID) 200 MCG tablet Take 1 tablet by mouth once daily.      mupirocin (BACTROBAN) 2 % ointment Apply topically 3 (three) times daily. 30 g 0    nirmatrelvir-ritonavir (PAXLOVID) 150-100 mg DsPk Use as directed on pack 20 tablet 0    potassium chloride (MICRO-K) 10 MEQ CpSR Take 1 capsule (10 mEq total) by mouth once daily. 30 capsule 12    pregabalin (LYRICA) 25 MG capsule Take 1 capsule (25 mg total) by mouth 2 (two) times daily. 60 capsule 0    rOPINIRole (REQUIP) 0.25 MG tablet 0.25 mg.      semaglutide (OZEMPIC) 1 mg/dose (4 mg/3 mL) Inject 1 mg into the skin every 7 days. 9 mL 3    tiotropium (SPIRIVA) 18 mcg inhalation capsule Inhale 1 capsule (18 mcg total) into the lungs once daily. Controller 90 capsule 3    traMADoL (ULTRAM) 50 mg tablet Take 1 tablet (50 mg total) by mouth every 8 (eight) hours as needed for Pain. 12 tablet 0    triamcinolone acetonide 0.1% (KENALOG) 0.1 % cream APPLY SMALL AMOUNT TOPICALLY TWICE A DAY AS NEEDED FOR ITCHING      vitamin D (VITAMIN D3) 1000 units Tab 25 mcg.      zinc  glycinate 30 mg Cap Take 30 mg by mouth once daily. 90 capsule 3     No facility-administered encounter medications on file as of 10/24/2024.       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  He does not have any pertinent problems on file.  He  has a past surgical history that includes Prostate surgery (2017); Eye surgery (2011); Tonsillectomy; and Cholecystectomy (2012).  His family history includes Arthritis in his mother; Cancer in his brother; Mental illness in his father.  He  reports that he quit smoking about 24 years ago. His smoking use included cigarettes. He started smoking about 69 years ago. He has a 2.3 pack-year smoking history. He does not have any smokeless tobacco history on file. He reports that he does not use drugs. No history on file for alcohol use.  He has a current medication list which includes the following prescription(s): albuterol, apixaban, atorvastatin, bumetanide, calcitriol, carvedilol, citalopram, doxazosin, dutasteride, empagliflozin, fluocinonide 0.05%, gvoke hypopen 2-pack, guaifenesin/phenylephrine hcl, levothyroxine, mupirocin, paxlovid, potassium chloride, pregabalin,  ropinirole, semaglutide, tiotropium, tramadol, triamcinolone acetonide 0.1%, vitamin d, and zinc glycinate.  Current Outpatient Medications on File Prior to Visit   Medication Sig Dispense Refill    albuterol (ACCUNEB) 1.25 mg/3 mL Nebu Take 3 mLs (1.25 mg total) by nebulization every 6 (six) hours as needed (cough, shortness of breath). Rescue 75 mL 11    apixaban (ELIQUIS) 2.5 mg Tab 2.5 mg 2 (two) times daily.      atorvastatin (LIPITOR) 20 MG tablet 20 mg.      bumetanide (BUMEX) 1 MG tablet Take 1 mg by mouth daily as needed.      calcitRIOL (ROCALTROL) 0.25 MCG Cap Take 1 capsule (0.25 mcg total) by mouth once a week. Active vitamin D 12 capsule 3    carvediloL (COREG) 3.125 MG tablet 3.125 mg once daily.      citalopram (CELEXA) 20 MG tablet 20 mg.      doxazosin (CARDURA) 8 MG Tab Take 8 mg by mouth once daily.      dutasteride (AVODART) 0.5 mg capsule Take 1 capsule by mouth once daily.      empagliflozin (JARDIANCE) 25 mg tablet Take 1 tablet (25 mg total) by mouth once daily. 90 tablet 3    fluocinonide 0.05% (LIDEX) 0.05 % cream APPLY SMALL AMOUNT TOPICALLY TWICE A DAY AS NEEDED ITCHING      glucagon (GVOKE HYPOPEN 2-PACK) 1 mg/0.2 mL AtIn Inject 1 mg into the skin as needed (SEVERE HYPOGLYCEMIA). 2 each 5    guaifenesin/phenylephrine HCl (MUCINEX COLD ORAL) Take 400 mg by mouth.      levothyroxine (SYNTHROID) 200 MCG tablet Take 1 tablet by mouth once daily.      mupirocin (BACTROBAN) 2 % ointment Apply topically 3 (three) times daily. 30 g 0    nirmatrelvir-ritonavir (PAXLOVID) 150-100 mg DsPk Use as directed on pack 20 tablet 0    potassium chloride (MICRO-K) 10 MEQ CpSR Take 1 capsule (10 mEq total) by mouth once daily. 30 capsule 12    pregabalin (LYRICA) 25 MG capsule Take 1 capsule (25 mg total) by mouth 2 (two) times daily. 60 capsule 0    rOPINIRole (REQUIP) 0.25 MG tablet 0.25 mg.      semaglutide (OZEMPIC) 1 mg/dose (4 mg/3 mL) Inject 1 mg into the skin every 7 days. 9 mL 3    tiotropium  (SPIRIVA) 18 mcg inhalation capsule Inhale 1 capsule (18 mcg total) into the lungs once daily. Controller 90 capsule 3    traMADoL (ULTRAM) 50 mg tablet Take 1 tablet (50 mg total) by mouth every 8 (eight) hours as needed for Pain. 12 tablet 0    triamcinolone acetonide 0.1% (KENALOG) 0.1 % cream APPLY SMALL AMOUNT TOPICALLY TWICE A DAY AS NEEDED FOR ITCHING      vitamin D (VITAMIN D3) 1000 units Tab 25 mcg.      zinc glycinate 30 mg Cap Take 30 mg by mouth once daily. 90 capsule 3     No current facility-administered medications on file prior to visit.     He is allergic to iodinated contrast media, iodine, and lobster..      BP Readings from Last 3 Encounters:   10/01/24 (!) 162/80   09/09/24 (!) 150/62   08/29/24 (!) 150/62     Snoring / Sleep:     B       MMRC Dyspnea Scale (4 is worst)     [] MMRC 0: Dyspneic on strenuous excercise (0 points)    [] MMRC 1: Dyspneic on walking a slight hill (0 points)    [] MMRC 2: Dyspneic on walking level ground; must stop occasionally due to breathlessness (1 point)    [] MMRC 3: Must stop for breathlessness after walking 100 yards or after a few minutes (2 points)    [] MMRC 4: Cannot leave house; breathless on dressing/undressing (3 points)                          No data to display                          Objective:     Vital Signs (Most Recent)   ]  Wt Readings from Last 2 Encounters:   09/09/24 89.4 kg (197 lb)   08/29/24 89.4 kg (197 lb)       Physical Exam  Vitals and nursing note reviewed.   Constitutional:       Appearance: Normal appearance.   HENT:      Head: Normocephalic.   Neurological:      Mental Status: He is alert and oriented to person, place, and time.          Laboratory  Lab Results   Component Value Date    WBC 7.06 08/29/2024    RBC 3.94 (L) 08/29/2024    HGB 11.5 (L) 08/29/2024    HCT 38.9 (L) 08/29/2024    MCV 99 (H) 08/29/2024    MCH 29.2 08/29/2024    MCHC 29.6 (L) 08/29/2024    RDW 14.3 08/29/2024     08/29/2024    MPV 12.4 08/29/2024     "GRAN 3.9 08/29/2024    GRAN 55.9 08/29/2024    LYMPH 2.3 08/29/2024    LYMPH 33.0 08/29/2024    MONO 0.6 08/29/2024    MONO 8.6 08/29/2024    EOS 0.1 08/29/2024    BASO 0.03 08/29/2024    EOSINOPHIL 1.8 08/29/2024    BASOPHIL 0.4 08/29/2024       BMP  Lab Results   Component Value Date     10/01/2024    K 4.4 10/01/2024     10/01/2024    CO2 24 10/01/2024    BUN 41 (H) 10/01/2024    CREATININE 2.8 (H) 10/01/2024    CALCIUM 9.0 10/01/2024    ANIONGAP 10 10/01/2024    AST 25 07/03/2024    ALT 24 07/03/2024    PROT 6.3 07/03/2024          Lab Results   Component Value Date    IGE <35 04/18/2024        No results found for: "ASPERGILLUS"  No results found for: "AFUMIGATUSCL"     No results found for: "ACE"     Diagnostic Results:  I have personally reviewed today the following studies:    X-Ray Chest PA And Lateral  Narrative: EXAMINATION:  XR CHEST PA AND LATERAL    CLINICAL HISTORY:  Shortness of breath    TECHNIQUE:  PA and lateral views of the chest were performed.    COMPARISON:  07/15/2024    FINDINGS:  Cardiac silhouette is enlarged.  Aortic tortuosity and calcification.    Eventration of the right hemidiaphragm with bowel loops projecting below the right hemidiaphragm similar to prior.  Linear opacities in the right middle lobe suggestive of scarring.  No definite pneumothorax.    Previous right-sided rib fractures.  Thoracic spondylosis.  Osseous demineralization.  Impression: As above.    Electronically signed by: Harvinder Justin  Date:    10/01/2024  Time:    10:55               Assessment/Plan:     Problem List Items Addressed This Visit       Obstructive sleep apnea syndrome    Restless leg syndrome    COPD mixed type    Oxygen dependent - Primary      Reassurance   Continue inhalers   Oxygen order faxed to [facility]  Provider notes for intial qualification 2018 reveiwed  Will attempt walk or ONSAT when physically feasible to come in       Follow up if symptoms worsen or fail to improve, for fax o2 " order to [facility], ONSAT as ordered on RA.    This note was prepared using voice recognition system and is likely to have sound alike errors that may have been overlooked even after proof reading.  Please call me with any questions    Discussed diagnosis, its evaluation, treatment and usual course. All questions answered.    Thank you for the courtesy of participating in the care of this patient    Tomas Tang MD      Personal Diagnostic Review  []  CXR    []  ECHO    []  ONSAT    []  6MWD    []  LABS    []  CHEST CT    []  PET CT    []  Biopsy results

## 2024-10-24 ENCOUNTER — OFFICE VISIT (OUTPATIENT)
Dept: SLEEP MEDICINE | Facility: CLINIC | Age: 89
End: 2024-10-24
Payer: OTHER GOVERNMENT

## 2024-10-24 DIAGNOSIS — G47.33 OBSTRUCTIVE SLEEP APNEA SYNDROME: ICD-10-CM

## 2024-10-24 DIAGNOSIS — Z99.81 OXYGEN DEPENDENT: Primary | ICD-10-CM

## 2024-10-24 DIAGNOSIS — G25.81 RESTLESS LEG SYNDROME: ICD-10-CM

## 2024-10-24 DIAGNOSIS — J44.9 COPD MIXED TYPE: ICD-10-CM

## 2024-10-24 PROCEDURE — 99213 OFFICE O/P EST LOW 20 MIN: CPT | Mod: 95,,, | Performed by: INTERNAL MEDICINE

## 2024-10-31 ENCOUNTER — TELEPHONE (OUTPATIENT)
Dept: DIABETES | Facility: CLINIC | Age: 89
End: 2024-10-31
Payer: OTHER GOVERNMENT

## 2024-10-31 ENCOUNTER — OFFICE VISIT (OUTPATIENT)
Dept: DIABETES | Facility: CLINIC | Age: 89
End: 2024-10-31
Payer: OTHER GOVERNMENT

## 2024-10-31 DIAGNOSIS — N18.32 TYPE 2 DIABETES MELLITUS WITH STAGE 3B CHRONIC KIDNEY DISEASE, WITH LONG-TERM CURRENT USE OF INSULIN: Primary | ICD-10-CM

## 2024-10-31 DIAGNOSIS — E11.22 TYPE 2 DIABETES MELLITUS WITH STAGE 3B CHRONIC KIDNEY DISEASE, WITH LONG-TERM CURRENT USE OF INSULIN: Primary | ICD-10-CM

## 2024-10-31 DIAGNOSIS — Z79.4 TYPE 2 DIABETES MELLITUS WITH STAGE 3B CHRONIC KIDNEY DISEASE, WITH LONG-TERM CURRENT USE OF INSULIN: Primary | ICD-10-CM

## 2024-10-31 PROCEDURE — 99214 OFFICE O/P EST MOD 30 MIN: CPT | Mod: 95,,, | Performed by: NURSE PRACTITIONER

## 2024-10-31 PROCEDURE — 95251 CONT GLUC MNTR ANALYSIS I&R: CPT | Mod: NDTC,,, | Performed by: NURSE PRACTITIONER

## 2024-10-31 PROCEDURE — G2211 COMPLEX E/M VISIT ADD ON: HCPCS | Mod: 95,,, | Performed by: NURSE PRACTITIONER

## 2024-10-31 NOTE — PROGRESS NOTES
The patient location is: Home  The chief complaint leading to consultation is: Diabetes    Visit type: audiovisual    Face to Face time with patient: 15  30 minutes of total time spent on the encounter, which includes face to face time and non-face to face time preparing to see the patient (eg, review of tests), Obtaining and/or reviewing separately obtained history, Documenting clinical information in the electronic or other health record, Independently interpreting results (not separately reported) and communicating results to the patient/family/caregiver, or Care coordination (not separately reported).  Each patient to whom he or she provides medical services by telemedicine is:  (1) informed of the relationship between the physician and patient and the respective role of any other health care provider with respect to management of the patient; and (2) notified that he or she may decline to receive medical services by telemedicine and may withdraw from such care at any time.    Notes:    Zach Lund is a 89 y.o. male who presents for a follow up evaluation of Type 2 diabetes mellitus.     CHIEF COMPLAINT: Diabetes Consultation    PCP: Bao Mcarthur MD      Initial visit with me - 5/23/2024    The patient was initially diagnosed with diabetes in 2017.   Patient from [place], recently moved to [place] with son. He is living at [facility]. Presents today with his son, Hugh, who is very involved in patient's care. Does not have nursing services. Patient is w/c bound currently due to fall in March, fracturing left knee. [facility]      Previous failed treatments include:  Trulicity - switched to ozempic.   Metformin  Glipizide     Social Documentation:  Patient lives in [place] at [facility].   Occupation: retired.   Exercise: none      Diabetes related complications:   nephropathy, retinopathy, and  "cardiovascular disease.   denies Pancreatitis  denies Gastroparesis  denies DKA  denies Hx/family Hx of MEN2/MTC  denies Frequent UTIs/yeast infections     Cardiovascular Risk Factors: advanced age (>55 for men, >65 for women), dyslipidemia, hypertension, male gender, obesity (BMI >30 kg/m2), and sedentary lifestyle.    Diabetes Medications               empagliflozin (JARDIANCE) 25 mg tablet Take 1 tablet (25 mg total) by mouth once daily.    glucagon (GVOKE HYPOPEN 2-PACK) 1 mg/0.2 mL AtIn Inject 1 mg into the skin as needed (SEVERE HYPOGLYCEMIA).    semaglutide (OZEMPIC) 1 mg/dose (4 mg/3 mL) Inject 1 mg into the skin every 7 days.     Current monitoring regimen:  G7 CGM    The patient's Dexcom CGM was downloaded and reviewed. For 14 days, patient average glucose was 197 mg/dL. He was above range 47% of the time, in range 53% of the time, and below range 0% of the time. The target range for this patient was 70 - 180 mg/dL. Overall, there was a pattern of post prandial hyperglyemia, more pronounced over the last 4 days. States eating more this week.          Patient currently taking insulin or sulfonylurea?  NO  Recent hypoglycemic episodes: No.     Patient compliant with glucose checks and medication administration? Yes    DIABETES MANAGEMENT STATUS  Statin: Taking  ACE/ARB: Not taking  Screening or Prevention Patient's value Goal Complete/Controlled?   HgA1C Testing and Control   Lab Results   Component Value Date    HGBA1C 6.8 (H) 05/23/2024      Annually/Less than 8% Yes   Lipid profile : 01/15/2024 Annually No   LDL control No results found for: "LDLCALC" Annually/Less than 100 mg/dl  No   Nephropathy screening Lab Results   Component Value Date    LABMICR 2360.0 07/12/2024     No results found for: "PROTEINUA"  Lab Results   Component Value Date    UTPCR 8.18 (H) 10/01/2024      Annually No   Blood pressure BP Readings from Last 1 Encounters:   10/01/24 (!) 162/80    Less than 140/90 No   Dilated retinal " "exam : 08/29/2024 Annually No   Foot exam   Most Recent Foot Exam Date: Not Found Annually No   Patient's medications, allergies, surgical, social and family histories were reviewed and updated as appropriate.     Review of Systems   Constitutional:  Negative for weight loss.   Eyes:  Negative for blurred vision and double vision.   Cardiovascular:  Negative for chest pain.   Gastrointestinal:  Negative for nausea and vomiting.   Genitourinary:  Negative for frequency.   Musculoskeletal:  Negative for falls.   Neurological:  Negative for dizziness and weakness.   Endo/Heme/Allergies:  Negative for polydipsia.   Psychiatric/Behavioral:  Negative for depression.    All other systems reviewed and are negative.       Physical Exam  Constitutional:       General: He is not in acute distress.     Appearance: Normal appearance.   Eyes:      Extraocular Movements: Extraocular movements intact.      Pupils: Pupils are equal, round, and reactive to light.   Cardiovascular:      Rate and Rhythm: Normal rate and regular rhythm.      Heart sounds: Normal heart sounds.   Pulmonary:      Effort: Pulmonary effort is normal. No respiratory distress.      Breath sounds: Normal breath sounds.   Musculoskeletal:      Cervical back: Normal range of motion and neck supple.   Neurological:      Mental Status: He is alert and oriented to person, place, and time.   Psychiatric:         Mood and Affect: Mood normal.         Behavior: Behavior normal.        There were no vitals taken for this visit.  Wt Readings from Last 3 Encounters:   09/09/24 89.4 kg (197 lb)   08/29/24 89.4 kg (197 lb)   07/15/24 95.9 kg (211 lb 6.7 oz)       LAB REVIEW  Lab Results   Component Value Date     10/01/2024    K 4.4 10/01/2024     10/01/2024    CO2 24 10/01/2024    BUN 41 (H) 10/01/2024    CREATININE 2.8 (H) 10/01/2024    CALCIUM 9.0 10/01/2024    ANIONGAP 10 10/01/2024    EGFRNORACEVR 20.8 (A) 10/01/2024     No results found for: "CPEPTIDE", " ""GLUTAMICACID", "INSLNABS"  Hemoglobin A1C   Date Value Ref Range Status   05/23/2024 6.8 (H) 4.0 - 5.6 % Final     Comment:     ADA Screening Guidelines:  5.7-6.4%  Consistent with prediabetes  >or=6.5%  Consistent with diabetes    High levels of fetal hemoglobin interfere with the HbA1C  assay. Heterozygous hemoglobin variants (HbS, HgC, etc)do  not significantly interfere with this assay.   However, presence of multiple variants may affect accuracy.          ASSESSMENT    ICD-10-CM ICD-9-CM   1. Type 2 diabetes mellitus with stage 3b chronic kidney disease, with long-term current use of insulin  E11.22 250.40    N18.32 585.3    Z79.4 V58.67             PLAN  Diagnoses and all orders for this visit:    Type 2 diabetes mellitus with stage 3b chronic kidney disease, with long-term current use of insulin  -     Cancel: Basic Metabolic Panel; Future  -     Hemoglobin A1C; Future      Reviewed pathophysiology of diabetes, complications related to the disease, importance of annual dilated eye exam and daily foot examination. Explained MOA, SE, dosage of medications. Written instructions given and reviewed with patient and patient verbalizes understanding.     6/13/2024 - doing well on current medication doses. Has not taken Lantus and instructed to not start. He is 63% in range with no hypoglycemia. He will need [facility], however [facility] was denied. Will consult with nephrology to assist, as that community care referral was approved. [facility] will [facility], and son states they will have appt with [facility] provider [facility] in next few weeks, but will run out of current medications prior to appt.     8/15/2024 - tested positive for covid yesterday.     10/31/2024 - will need to send refill to [facility] for G7 with today's notes. Discussed making some dietary changes to improve mealtime excursions.     PATIENT INSTRUCTIONS     Will send refill to [facility] for G7 " with todays notes.   Continue Lantus 5 units subcutaneously daily.   Continue Jardiance 25 mg by mouth daily.   Continue Ozempic 1 mg subcutaneously every 7 days.     Continue Dexcom G7 CGM  Blood Sugar Goals:       Fastin-180.       1-2 hours after a meal: Less than 200.       Follow up in about 3 months (around 2025) for Dexcom.

## 2024-10-31 NOTE — PATIENT INSTRUCTIONS
PATIENT INSTRUCTIONS     Will send refill to [facility] for G7 with todays notes.   Continue Lantus 5 units subcutaneously daily.   Continue Jardiance 25 mg by mouth daily.   Continue Ozempic 1 mg subcutaneously every 7 days.     Continue Dexcom G7 CGM  Blood Sugar Goals:       Fastin-180.       1-2 hours after a meal: Less than 200.

## 2024-11-21 ENCOUNTER — TELEPHONE (OUTPATIENT)
Dept: CARDIOLOGY | Facility: HOSPITAL | Age: 89
End: 2024-11-21
Payer: OTHER GOVERNMENT

## 2024-11-26 ENCOUNTER — OFFICE VISIT (OUTPATIENT)
Dept: CARDIOLOGY | Facility: CLINIC | Age: 89
End: 2024-11-26
Payer: OTHER GOVERNMENT

## 2024-11-26 VITALS
DIASTOLIC BLOOD PRESSURE: 80 MMHG | BODY MASS INDEX: 27.84 KG/M2 | SYSTOLIC BLOOD PRESSURE: 180 MMHG | WEIGHT: 194 LBS | HEART RATE: 74 BPM

## 2024-11-26 DIAGNOSIS — I10 ESSENTIAL HYPERTENSION: ICD-10-CM

## 2024-11-26 DIAGNOSIS — I50.30 HEART FAILURE WITH PRESERVED EJECTION FRACTION, UNSPECIFIED HF CHRONICITY: ICD-10-CM

## 2024-11-26 DIAGNOSIS — I50.32 CHRONIC DIASTOLIC CONGESTIVE HEART FAILURE: ICD-10-CM

## 2024-11-26 DIAGNOSIS — I87.2 VENOUS STASIS DERMATITIS OF BOTH LOWER EXTREMITIES: ICD-10-CM

## 2024-11-26 DIAGNOSIS — I70.0 ATHEROSCLEROSIS OF AORTA: Chronic | ICD-10-CM

## 2024-11-26 DIAGNOSIS — E78.5 DYSLIPIDEMIA: ICD-10-CM

## 2024-11-26 DIAGNOSIS — J44.9 COPD MIXED TYPE: Primary | ICD-10-CM

## 2024-11-26 DIAGNOSIS — I48.20 CHRONIC ATRIAL FIBRILLATION: ICD-10-CM

## 2024-11-26 DIAGNOSIS — Z79.01 LONG TERM CURRENT USE OF ANTICOAGULANT: ICD-10-CM

## 2024-11-26 DIAGNOSIS — R60.0 BILATERAL LOWER EXTREMITY EDEMA: ICD-10-CM

## 2024-11-26 PROCEDURE — 99214 OFFICE O/P EST MOD 30 MIN: CPT | Mod: PBBFAC,PO | Performed by: INTERNAL MEDICINE

## 2024-11-26 PROCEDURE — 99999 PR PBB SHADOW E&M-EST. PATIENT-LVL IV: CPT | Mod: PBBFAC,,, | Performed by: INTERNAL MEDICINE

## 2024-11-26 PROCEDURE — 99214 OFFICE O/P EST MOD 30 MIN: CPT | Mod: S$PBB,,, | Performed by: INTERNAL MEDICINE

## 2024-11-26 RX ORDER — INSULIN GLARGINE 100 [IU]/ML
INJECTION, SOLUTION SUBCUTANEOUS
COMMUNITY
Start: 2024-09-20

## 2024-11-26 NOTE — PROGRESS NOTES
Subjective:   Patient ID:  Zach Lund is a 89 y.o. male who presents for follow-up of Follow-up (3 mnths)  Pt with chronic SOB with COPD and o2 at night no changes  Echo nml EF  Congestive Heart Failure  Presents for follow-up visit. Pertinent negatives include no abdominal pain, chest pain, chest pressure, claudication, edema, fatigue, muscle weakness, near-syncope, nocturia, orthopnea, palpitations, paroxysmal nocturnal dyspnea, shortness of breath or unexpected weight change. The symptoms have been stable. Compliance with total regimen is %. Compliance with diet is %. Compliance with exercise is %. Compliance with medications is %.   Atrial Fibrillation  Presents for follow-up visit. Symptoms are negative for bradycardia, chest pain, dizziness, palpitations, shortness of breath, syncope, tachycardia and weakness. The symptoms have been stable. There are no medication compliance problems.       Review of Systems   Constitutional: Negative. Negative for fatigue, unexpected weight change and weight gain.   HENT: Negative.     Eyes: Negative.    Cardiovascular: Negative.  Negative for chest pain, claudication, leg swelling, near-syncope, palpitations and syncope.   Respiratory:  Negative for shortness of breath.    Endocrine: Negative.    Hematologic/Lymphatic: Negative.    Skin: Negative.    Musculoskeletal:  Negative for muscle weakness.   Gastrointestinal: Negative.  Negative for abdominal pain.   Genitourinary: Negative.  Negative for nocturia.   Neurological: Negative.  Negative for dizziness and weakness.   Psychiatric/Behavioral: Negative.     Allergic/Immunologic: Negative.    All other systems reviewed and are negative.    Family History   Problem Relation Name Age of Onset    Arthritis Mother Jacque     Mental illness Father Brandon     Cancer Brother Don      Past Medical History:   Diagnosis Date    Arteriosclerosis of coronary artery 03/22/2024    Atrial fibrillation 09/13/2017     Formatting of this note might be different from the original.   6/26/2020 EKG: Atrial fibrillation      COPD (chronic obstructive pulmonary disease) 06/01/2017    Formatting of this note might be different from the original.   Former smoker, 10.00 pack-year      Last Assessment & Plan:    Formatting of this note might be different from the original.   COPD is unchanged.   COPD information handout given.      Continue albuterol plan.      Diabetic polyneuropathy associated with type 2 diabetes mellitus 05/03/2017    Formatting of this note is different from the original.   Lab Results    Component Value Date/Time     HGBA1C 6.2 (H) 01/26/2023 08:55 AM     GFR 39 (L) 12/09/2022 08:40 AM     GFRCNAFA 34 (L) 02/17/2022 01:44 PM     MICROALBCREA 22.4 10/31/2022 12:11 PM     LDLCALC 43 02/17/2022 01:44 PM       Last Eye Exam: 2/2/2023    Last Foot Exam: 5/18/2022 2/8/24 Discussed higher post prandial glucose    Dyslipidemia 09/13/2017    Essential hypertension 05/03/2017    Heart failure with preserved ejection fraction 09/13/2017    Last Assessment & Plan:    Formatting of this note might be different from the original.   Continue weight management - Continue Bumex. Patient euvolemic on exam.  Formatting of this note might be different from the original.   11/9/2020 Echo: The left ventricular ejection fraction is 60-65% by visual estimation.         Last Assessment & Plan:    Formatting of this note might be different from th    Hypothyroid 05/03/2017    Formatting of this note might be different from the original.   10/31/22 TSH 0.136. Decrease levothyroxine 175 mcg and recheck 12 weeks.      Last Assessment & Plan:    Formatting of this note might be different from the original.   Continue home dose of Synthroid.      Recurrent major depressive disorder, in partial remission 07/13/2017    Formatting of this note is different from the original.        1/13/2023     10:00 AM 10/31/2022     10:00 AM 8/30/2022       2:30 PM 2022     10:00 AM 2021     10:00 AM    PHQ9 Trend    PHQ9 Total Score 5 0 0 8 0          Last Assessment & Plan:    Formatting of this note might be different from the original.   Patient having an exacerbation of anxiety due to health condition. 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   Lab Results    Component Value Date/Time     HGBA1C 6.2 (H) 2023 08:55 AM      Stage 3b chronic kidney disease 2020    Formatting of this note is different from the original.   Lab Results    Component Value Date/Time     GFR 39 (L) 2022 08:40 AM     GFR 39 (L) 2022 12:30 PM     GFR 36 (L) 2022 10:25 AM     GFRCNAFA 34 (L) 2022 01:44 PM     GFRCNAFA 34 (L) 10/07/2021 10:19 AM     GFRCNAFA 34 (L) 2021 09:29 AM          Last Assessment & Plan:    Formatting of this note might be differe    Type 2 diabetes mellitus with stage 3 chronic kidney disease, with long-term current use of insulin 2017    Formatting of this note is different from the original.   Lab Results    Component Value Date/Time     HGBA1C 6.2 (H) 2023 08:55 AM     GFR 39 (L) 2022 08:40 AM     GFRCNAFA 34 (L) 2022 01:44 PM     MICROALBCREA 22.4 10/31/2022 12:11 PM     LDLCALC 43 2022 01:44 PM       Last Eye Exam: 2023    Last Foot Exam: 2022       Last Assessment & Plan:    Formatting of this     Social History     Socioeconomic History    Marital status:    Tobacco Use    Smoking status: Former     Current packs/day: 0.00     Average packs/day: 0.1 packs/day for 45.0 years (2.3 ttl pk-yrs)     Types: Cigarettes     Start date: 1955     Quit date: 2000     Years since quittin.9    Tobacco comments:     never a regular smoker.  would only have one in social situations   Substance and Sexual Activity     Drug use: Never    Sexual activity: Not Currently     Partners: Female     Birth control/protection: Other-see comments     Comment: My stuff doesnt work anymore     Social Drivers of Health     Financial Resource Strain: Medium Risk (3/19/2024)    Overall Financial Resource Strain (CARDIA)     Difficulty of Paying Living Expenses: Somewhat hard   Food Insecurity: Food Insecurity Present (3/19/2024)    Hunger Vital Sign     Worried About Running Out of Food in the Last Year: Sometimes true     Ran Out of Food in the Last Year: Never true   Transportation Needs: Unmet Transportation Needs (3/19/2024)    PRAPARE - Transportation     Lack of Transportation (Medical): Yes     Lack of Transportation (Non-Medical): No   Physical Activity: Inactive (3/19/2024)    Exercise Vital Sign     Days of Exercise per Week: 0 days     Minutes of Exercise per Session: 0 min   Stress: Stress Concern Present (3/19/2024)    [employer] [place] [employer] - Occupational Stress Questionnaire     Feeling of Stress : To some extent   Housing Stability: High Risk (3/19/2024)    Housing Stability Vital Sign     Unable to Pay for Housing in the Last Year: No     Number of Places Lived in the Last Year: 4     Unstable Housing in the Last Year: No     Current Outpatient Medications on File Prior to Visit   Medication Sig Dispense Refill    albuterol (ACCUNEB) 1.25 mg/3 mL Nebu Take 3 mLs (1.25 mg total) by nebulization every 6 (six) hours as needed (cough, shortness of breath). Rescue 75 mL 11    apixaban (ELIQUIS) 2.5 mg Tab 2.5 mg 2 (two) times daily.      atorvastatin (LIPITOR) 20 MG tablet 20 mg.      calcitRIOL (ROCALTROL) 0.25 MCG Cap Take 1 capsule (0.25 mcg total) by mouth once a week. Active vitamin D 12 capsule 3    carvediloL (COREG) 3.125 MG tablet 3.125 mg once daily.      citalopram (CELEXA) 20 MG tablet 20 mg.      doxazosin (CARDURA) 8 MG Tab Take 8 mg by mouth once daily.      dutasteride (AVODART) 0.5 mg capsule  Take 1 capsule by mouth once daily.      empagliflozin (JARDIANCE) 25 mg tablet Take 1 tablet (25 mg total) by mouth once daily. 90 tablet 3    fluocinonide 0.05% (LIDEX) 0.05 % cream APPLY SMALL AMOUNT TOPICALLY TWICE A DAY AS NEEDED ITCHING      guaifenesin/phenylephrine HCl (MUCINEX COLD ORAL) Take 400 mg by mouth. (Patient taking differently: Take 400 mg by mouth. PRN)      insulin glargine U-100, Lantus, 100 unit/mL (3 mL) SubQ InPn pen 8 unit BEDTIME (route: subcutaneous)      levothyroxine (SYNTHROID) 200 MCG tablet Take 1 tablet by mouth once daily.      mupirocin (BACTROBAN) 2 % ointment Apply topically 3 (three) times daily. 30 g 0    pregabalin (LYRICA) 25 MG capsule Take 1 capsule (25 mg total) by mouth 2 (two) times daily. 60 capsule 0    rOPINIRole (REQUIP) 0.25 MG tablet 0.25 mg.      semaglutide (OZEMPIC) 1 mg/dose (4 mg/3 mL) Inject 1 mg into the skin every 7 days. 9 mL 3    tiotropium (SPIRIVA) 18 mcg inhalation capsule Inhale 1 capsule (18 mcg total) into the lungs once daily. Controller 90 capsule 3    traMADoL (ULTRAM) 50 mg tablet Take 1 tablet (50 mg total) by mouth every 8 (eight) hours as needed for Pain. 12 tablet 0    triamcinolone acetonide 0.1% (KENALOG) 0.1 % cream APPLY SMALL AMOUNT TOPICALLY TWICE A DAY AS NEEDED FOR ITCHING      vitamin D (VITAMIN D3) 1000 units Tab 25 mcg.      bumetanide (BUMEX) 1 MG tablet Take 1 mg by mouth daily as needed. (Patient not taking: Reported on 11/26/2024)      glucagon (GVOKE HYPOPEN 2-PACK) 1 mg/0.2 mL AtIn Inject 1 mg into the skin as needed (SEVERE HYPOGLYCEMIA). (Patient not taking: Reported on 11/26/2024) 2 each 5    nirmatrelvir-ritonavir (PAXLOVID) 150-100 mg DsPk Use as directed on pack 20 tablet 0    potassium chloride (MICRO-K) 10 MEQ CpSR Take 1 capsule (10 mEq total) by mouth once daily. (Patient not taking: Reported on 11/26/2024) 30 capsule 12    zinc glycinate 30 mg Cap Take 30 mg by mouth once daily. (Patient not taking: Reported on  11/26/2024) 90 capsule 3     No current facility-administered medications on file prior to visit.     Review of patient's allergies indicates:   Allergen Reactions    Iodinated contrast media Anaphylaxis    Iodine Anaphylaxis     Breathing issue    Lobster Anaphylaxis       Objective:     Physical Exam  Vitals and nursing note reviewed.   Constitutional:       Appearance: He is well-developed.   HENT:      Head: Normocephalic and atraumatic.   Eyes:      Conjunctiva/sclera: Conjunctivae normal.      Pupils: Pupils are equal, round, and reactive to light.   Cardiovascular:      Rate and Rhythm: Normal rate and regular rhythm.      Pulses: Intact distal pulses.      Heart sounds: Normal heart sounds.   Pulmonary:      Effort: Pulmonary effort is normal.      Breath sounds: Normal breath sounds.   Abdominal:      General: Bowel sounds are normal.      Palpations: Abdomen is soft.   Musculoskeletal:      Cervical back: Normal range of motion and neck supple.   Skin:     General: Skin is warm and dry.   Neurological:      Mental Status: He is alert and oriented to person, place, and time.         Assessment:     1. COPD mixed type    2. Venous stasis dermatitis of both lower extremities    3. Heart failure with preserved ejection fraction, unspecified HF chronicity    4. Essential hypertension    5. Dyslipidemia    6. Chronic atrial fibrillation    7. Chronic diastolic congestive heart failure    8. Atherosclerosis of aorta    9. Long term current use of anticoagulant    10. Bilateral lower extremity edema        Plan:     BP diary  Continue coreg, diuretics- HTN/CHF  Continue eliquis- PAF  Increase coreg bid

## 2024-11-27 ENCOUNTER — PATIENT MESSAGE (OUTPATIENT)
Dept: FAMILY MEDICINE | Facility: CLINIC | Age: 89
End: 2024-11-27
Payer: OTHER GOVERNMENT

## 2024-12-20 ENCOUNTER — LAB VISIT (OUTPATIENT)
Dept: LAB | Facility: HOSPITAL | Age: 89
End: 2024-12-20
Attending: INTERNAL MEDICINE
Payer: OTHER GOVERNMENT

## 2024-12-20 DIAGNOSIS — N18.4 STAGE 4 CHRONIC KIDNEY DISEASE: ICD-10-CM

## 2024-12-20 LAB
CREAT UR-MCNC: 51 MG/DL (ref 23–375)
PROT UR-MCNC: 443 MG/DL (ref 0–15)
PROT/CREAT UR: 8.69 MG/G{CREAT} (ref 0–0.2)

## 2024-12-20 PROCEDURE — 84156 ASSAY OF PROTEIN URINE: CPT | Performed by: INTERNAL MEDICINE

## 2024-12-26 ENCOUNTER — OFFICE VISIT (OUTPATIENT)
Dept: NEPHROLOGY | Facility: CLINIC | Age: 89
End: 2024-12-26
Payer: OTHER GOVERNMENT

## 2024-12-26 VITALS
DIASTOLIC BLOOD PRESSURE: 80 MMHG | HEART RATE: 66 BPM | SYSTOLIC BLOOD PRESSURE: 162 MMHG | WEIGHT: 194 LBS | BODY MASS INDEX: 27.77 KG/M2 | OXYGEN SATURATION: 98 % | HEIGHT: 70 IN

## 2024-12-26 DIAGNOSIS — Z79.4 TYPE 2 DIABETES MELLITUS WITH STAGE 4 CHRONIC KIDNEY DISEASE, WITH LONG-TERM CURRENT USE OF INSULIN: ICD-10-CM

## 2024-12-26 DIAGNOSIS — N18.4 STAGE 4 CHRONIC KIDNEY DISEASE: Primary | ICD-10-CM

## 2024-12-26 DIAGNOSIS — E55.9 VITAMIN D DEFICIENCY: ICD-10-CM

## 2024-12-26 DIAGNOSIS — E11.22 TYPE 2 DIABETES MELLITUS WITH STAGE 4 CHRONIC KIDNEY DISEASE, WITH LONG-TERM CURRENT USE OF INSULIN: ICD-10-CM

## 2024-12-26 DIAGNOSIS — E13.3399: ICD-10-CM

## 2024-12-26 DIAGNOSIS — I10 ESSENTIAL HYPERTENSION: ICD-10-CM

## 2024-12-26 DIAGNOSIS — N25.81 SECONDARY HYPERPARATHYROIDISM OF RENAL ORIGIN: ICD-10-CM

## 2024-12-26 DIAGNOSIS — I50.32 CHRONIC HEART FAILURE WITH PRESERVED EJECTION FRACTION: ICD-10-CM

## 2024-12-26 DIAGNOSIS — N18.4 TYPE 2 DIABETES MELLITUS WITH STAGE 4 CHRONIC KIDNEY DISEASE, WITH LONG-TERM CURRENT USE OF INSULIN: ICD-10-CM

## 2024-12-26 DIAGNOSIS — R80.9 PROTEINURIA, UNSPECIFIED TYPE: ICD-10-CM

## 2024-12-26 PROCEDURE — 99215 OFFICE O/P EST HI 40 MIN: CPT | Mod: PBBFAC,PO | Performed by: INTERNAL MEDICINE

## 2024-12-26 PROCEDURE — 99999 PR PBB SHADOW E&M-EST. PATIENT-LVL V: CPT | Mod: PBBFAC,,, | Performed by: INTERNAL MEDICINE

## 2024-12-26 NOTE — PROGRESS NOTES
Subjective:      Patient ID: Zach Lund is a 89 y.o. White male who presents for follow up evaluation of Chronic Kidney Disease    HPI    June 2024:  Referred by PCP for CKD  Lives at [place]   From [place], saw Nephrology in the past, was told 'wasn't good but not very bad either' and that the heart and kidneys work against each other, medications  Saw Cards in the past for CHF, on Bumex for LE edema, and BP uncontrolled   sCr 1.4 in 2023 then 1.8 Jan 2024, 1.9 April and now 2.3mg/dL 750mcg albuminuria in 2023  On Jardiance and Ozempic,  On Bumex 2-4 per day pending volume status   April fall--still not walking   Off Lantus since on Jardiance seeing Endocrine soon   Likes salt   No NSAIDs, only Tylenol or tramadol     Oct 2024: Lyrica withdrawal, had change in cognition, with nausea and vomiting, resumed yesterday and already feeling better, and improving. Ate breakfast this am and feels fine, but cognition still a little off.  Daily weights. COVID one month ago. Stopped KCl    Dec 2024: Doing OK, he devloped two ulcers on LLE with sig edema. Cards increased coreg, BP remains elevated, also a factor of adherence but son is now prepackaging the medications       Review of Systems   HENT:  Negative for facial swelling.    Cardiovascular:  Positive for leg swelling.   Gastrointestinal:  Positive for nausea and vomiting.   Musculoskeletal:  Positive for arthralgias.   Allergic/Immunologic: Positive for immunocompromised state.   Psychiatric/Behavioral:  Positive for confusion.       Objective:     Physical Exam  Vitals and nursing note reviewed.   Constitutional:       General: He is not in acute distress.     Appearance: He is not ill-appearing.      Comments: In motorized w/c    Cardiovascular:      Rate and Rhythm: Normal rate and regular rhythm.   Pulmonary:      Breath sounds: No wheezing (decreased lung sounds on R lung) or rales.   Musculoskeletal:      Right lower leg: Edema present.      Left lower leg:  Edema present.   Skin:     General: Skin is warm and dry.   Neurological:      Mental Status: He is alert.   Psychiatric:         Mood and Affect: Mood normal.     Assessment:     1. Stage 4 chronic kidney disease    2. Secondary hyperparathyroidism of renal origin    3. Proteinuria, unspecified type    4. Chronic heart failure with preserved ejection fraction    5. Type 2 diabetes mellitus with stage 4 chronic kidney disease, with long-term current use of insulin    6. Vitamin D deficiency    7. 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 Essential hypertension           Plan:     Initial Consult  Progressive CKD with macroalbuminuria   - he appears to be currently at Stage 4  - discussed stages of CKD, discussed tips on kidney health, emphasized low sodium diet and increase fruits and vegetables, challenging somewhat as his meals are set at assisted living and he craves sodium   - for treatment continue Jardiance and Ozempic     Oct 2024:  - expected drop in GFR from SGLT2i and stable, will improve with time  - Stage 4 CKD, check chemistries today due to recent n/v  - stop calcium, already stopped K+. Add zinc and recheck mag prior to supplementing. Recheck potassium today   - add calcitriol weekly  - recent n/v thus no recent Bumex, continue daily weights, low sodium diet   - check CXR given decreased RLL on exam     Dec 2024:  - GFR stable, as above expected after starting SGLT2i   - PTH improved with addition of calcitriol, continue  - nephrotic range proteiuria X 2>>BP control   - diurese by using Bumex daily, not prn     REFER TO PCP FOR ULCERS    RTC Feb 2025  40 minutes of total time spent on the encounter, which includes face to face time and non-face to face time preparing to see the patient (eg, review of tests), Obtaining and/or reviewing separately obtained history, Documenting clinical information in the electronic or other  health record, Independently interpreting results (not separately reported) and communicating results to the patient/family/caregiver, or Care coordination (not separately reported).  Visit today included increased complexity associated with the care of the episodic problem addressed and/or managing the longitudinal care of the patient due to the serious and/or complex managed problem(s) as per above diagnosis list.

## 2024-12-27 ENCOUNTER — PATIENT MESSAGE (OUTPATIENT)
Dept: DIABETES | Facility: CLINIC | Age: 89
End: 2024-12-27
Payer: OTHER GOVERNMENT

## 2024-12-27 ENCOUNTER — PATIENT MESSAGE (OUTPATIENT)
Dept: NEPHROLOGY | Facility: CLINIC | Age: 89
End: 2024-12-27
Payer: OTHER GOVERNMENT

## 2025-01-01 ENCOUNTER — PATIENT MESSAGE (OUTPATIENT)
Dept: FAMILY MEDICINE | Facility: CLINIC | Age: OVER 89
End: 2025-01-01
Payer: MEDICARE

## 2025-01-01 ENCOUNTER — LAB VISIT (OUTPATIENT)
Dept: LAB | Facility: HOSPITAL | Age: OVER 89
End: 2025-01-01
Attending: INTERNAL MEDICINE
Payer: MEDICARE

## 2025-01-01 DIAGNOSIS — S81.802A WOUND OF LEFT LOWER EXTREMITY, INITIAL ENCOUNTER: ICD-10-CM

## 2025-01-01 DIAGNOSIS — F41.9 ANXIETY: ICD-10-CM

## 2025-01-01 DIAGNOSIS — G47.00 INSOMNIA, UNSPECIFIED TYPE: ICD-10-CM

## 2025-01-01 DIAGNOSIS — N18.4 STAGE 4 CHRONIC KIDNEY DISEASE: ICD-10-CM

## 2025-01-01 DIAGNOSIS — R80.9 PROTEINURIA, UNSPECIFIED TYPE: ICD-10-CM

## 2025-01-01 DIAGNOSIS — R41.89 COGNITIVE DECLINE: ICD-10-CM

## 2025-01-01 DIAGNOSIS — R41.89 COGNITIVE DECLINE: Primary | ICD-10-CM

## 2025-01-01 DIAGNOSIS — I50.32 CHRONIC HEART FAILURE WITH PRESERVED EJECTION FRACTION: ICD-10-CM

## 2025-01-01 LAB
BACTERIA #/AREA URNS HPF: NORMAL /HPF
BILIRUB UR QL STRIP: NEGATIVE
CLARITY UR: CLEAR
COLOR UR: YELLOW
CREAT UR-MCNC: 62 MG/DL (ref 23–375)
GLUCOSE UR QL STRIP: ABNORMAL
HGB UR QL STRIP: ABNORMAL
HYALINE CASTS #/AREA URNS LPF: 0 /LPF
KETONES UR QL STRIP: NEGATIVE
LEUKOCYTE ESTERASE UR QL STRIP: NEGATIVE
MICROSCOPIC COMMENT: NORMAL
NITRITE UR QL STRIP: NEGATIVE
PH UR STRIP: 6 [PH] (ref 5–8)
PROT UR QL STRIP: ABNORMAL
PROT UR-MCNC: 227 MG/DL (ref 0–15)
PROT/CREAT UR: 3.66 MG/G{CREAT} (ref 0–0.2)
RBC #/AREA URNS HPF: 1 /HPF (ref 0–4)
SP GR UR STRIP: 1 (ref 1–1.03)
SQUAMOUS #/AREA URNS HPF: 1 /HPF
URN SPEC COLLECT METH UR: ABNORMAL
WBC #/AREA URNS HPF: 0 /HPF (ref 0–5)
YEAST URNS QL MICRO: NORMAL

## 2025-01-01 PROCEDURE — 84156 ASSAY OF PROTEIN URINE: CPT | Performed by: INTERNAL MEDICINE

## 2025-01-01 PROCEDURE — 81000 URINALYSIS NONAUTO W/SCOPE: CPT | Mod: PO | Performed by: FAMILY MEDICINE

## 2025-01-01 RX ORDER — ALPRAZOLAM 0.5 MG/1
0.5 TABLET ORAL NIGHTLY PRN
Qty: 30 TABLET | Refills: 0 | Status: SHIPPED | OUTPATIENT
Start: 2025-01-01 | End: 2025-03-09

## 2025-01-01 RX ORDER — BUPRENORPHINE 10 UG/H
1 PATCH TRANSDERMAL
OUTPATIENT
Start: 2025-01-01

## 2025-01-01 RX ORDER — MUPIROCIN 20 MG/G
OINTMENT TOPICAL 3 TIMES DAILY
Qty: 110 G | Refills: 0 | Status: SHIPPED | OUTPATIENT
Start: 2025-01-01

## 2025-01-01 RX ORDER — ALPRAZOLAM 0.5 MG/1
0.5 TABLET ORAL NIGHTLY PRN
Qty: 30 TABLET | Refills: 0 | Status: SHIPPED | OUTPATIENT
Start: 2025-01-01 | End: 2025-01-01

## 2025-01-02 ENCOUNTER — PATIENT MESSAGE (OUTPATIENT)
Dept: FAMILY MEDICINE | Facility: CLINIC | Age: OVER 89
End: 2025-01-02
Payer: OTHER GOVERNMENT
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
## 2025-01-03 RX ORDER — TRAMADOL HYDROCHLORIDE 50 MG/1
50 TABLET ORAL EVERY 8 HOURS PRN
Qty: 12 TABLET | Refills: 0 | Status: CANCELLED | OUTPATIENT
Start: 2025-01-03

## 2025-01-03 NOTE — TELEPHONE ENCOUNTER
Oli, pt son messaged saying patient is in significant pain and has taken the last of the tramadol he had. They are request a refill, it has been 6b months since the patient was last seen. Also, Oli is requesting patient see Josseline too. He wants a lift to assist him getting pt wheelchair into his vehicle. I advised oli patient needs appt. I will get him set up.

## 2025-01-03 NOTE — TELEPHONE ENCOUNTER
No care due was identified.  [facility] Embedded Care Due Messages. Reference number: [id number].   1/03/2025 10:39:49 AM CST

## 2025-01-09 ENCOUNTER — TELEPHONE (OUTPATIENT)
Dept: FAMILY MEDICINE | Facility: CLINIC | Age: OVER 89
End: 2025-01-09

## 2025-01-09 ENCOUNTER — OFFICE VISIT (OUTPATIENT)
Dept: UROLOGY | Facility: CLINIC | Age: OVER 89
End: 2025-01-09
Payer: MEDICARE

## 2025-01-09 ENCOUNTER — OFFICE VISIT (OUTPATIENT)
Dept: FAMILY MEDICINE | Facility: CLINIC | Age: OVER 89
End: 2025-01-09
Payer: MEDICARE

## 2025-01-09 VITALS
SYSTOLIC BLOOD PRESSURE: 136 MMHG | OXYGEN SATURATION: 95 % | DIASTOLIC BLOOD PRESSURE: 78 MMHG | WEIGHT: 201 LBS | BODY MASS INDEX: 28.77 KG/M2 | HEIGHT: 70 IN | HEART RATE: 55 BPM

## 2025-01-09 VITALS — BODY MASS INDEX: 28.77 KG/M2 | HEIGHT: 70 IN | WEIGHT: 201 LBS

## 2025-01-09 DIAGNOSIS — I73.9 PAD (PERIPHERAL ARTERY DISEASE): ICD-10-CM

## 2025-01-09 DIAGNOSIS — R35.0 BENIGN PROSTATIC HYPERPLASIA WITH URINARY FREQUENCY: ICD-10-CM

## 2025-01-09 DIAGNOSIS — N13.8 BPH WITH URINARY OBSTRUCTION: Primary | ICD-10-CM

## 2025-01-09 DIAGNOSIS — R35.0 URINARY FREQUENCY: ICD-10-CM

## 2025-01-09 DIAGNOSIS — Z79.4 TYPE 2 DIABETES MELLITUS WITH STAGE 3 CHRONIC KIDNEY DISEASE, WITH LONG-TERM CURRENT USE OF INSULIN, UNSPECIFIED WHETHER STAGE 3A OR 3B CKD: Primary | ICD-10-CM

## 2025-01-09 DIAGNOSIS — J44.9 COPD MIXED TYPE: ICD-10-CM

## 2025-01-09 DIAGNOSIS — L97.922 NON-PRESSURE CHRONIC ULCER OF UNSPECIFIED PART OF LEFT LOWER LEG WITH FAT LAYER EXPOSED: ICD-10-CM

## 2025-01-09 DIAGNOSIS — R39.12 WEAK URINE STREAM: ICD-10-CM

## 2025-01-09 DIAGNOSIS — N18.4 CKD STAGE 4 DUE TO TYPE 2 DIABETES MELLITUS: ICD-10-CM

## 2025-01-09 DIAGNOSIS — M79.89 SOFT TISSUE MASS: ICD-10-CM

## 2025-01-09 DIAGNOSIS — S81.802A WOUND OF LEFT LOWER EXTREMITY, INITIAL ENCOUNTER: ICD-10-CM

## 2025-01-09 DIAGNOSIS — N40.1 BPH WITH URINARY OBSTRUCTION: Primary | ICD-10-CM

## 2025-01-09 DIAGNOSIS — Z79.4 TYPE 2 DIABETES MELLITUS WITH STAGE 4 CHRONIC KIDNEY DISEASE, WITH LONG-TERM CURRENT USE OF INSULIN: ICD-10-CM

## 2025-01-09 DIAGNOSIS — I50.32 CHRONIC HEART FAILURE WITH PRESERVED EJECTION FRACTION: ICD-10-CM

## 2025-01-09 DIAGNOSIS — N40.1 BENIGN PROSTATIC HYPERPLASIA WITH URINARY FREQUENCY: ICD-10-CM

## 2025-01-09 DIAGNOSIS — Z13.220 ENCOUNTER FOR LIPID SCREENING FOR CARDIOVASCULAR DISEASE: ICD-10-CM

## 2025-01-09 DIAGNOSIS — N32.81 OAB (OVERACTIVE BLADDER): ICD-10-CM

## 2025-01-09 DIAGNOSIS — Z12.5 SCREENING PSA (PROSTATE SPECIFIC ANTIGEN): ICD-10-CM

## 2025-01-09 DIAGNOSIS — I48.20 CHRONIC ATRIAL FIBRILLATION: ICD-10-CM

## 2025-01-09 DIAGNOSIS — N18.4 TYPE 2 DIABETES MELLITUS WITH STAGE 4 CHRONIC KIDNEY DISEASE, WITH LONG-TERM CURRENT USE OF INSULIN: ICD-10-CM

## 2025-01-09 DIAGNOSIS — Z79.899 ENCOUNTER FOR LONG-TERM (CURRENT) USE OF MEDICATIONS: ICD-10-CM

## 2025-01-09 DIAGNOSIS — E11.22 TYPE 2 DIABETES MELLITUS WITH STAGE 4 CHRONIC KIDNEY DISEASE, WITH LONG-TERM CURRENT USE OF INSULIN: ICD-10-CM

## 2025-01-09 DIAGNOSIS — E11.22 CKD STAGE 4 DUE TO TYPE 2 DIABETES MELLITUS: ICD-10-CM

## 2025-01-09 DIAGNOSIS — N18.30 TYPE 2 DIABETES MELLITUS WITH STAGE 3 CHRONIC KIDNEY DISEASE, WITH LONG-TERM CURRENT USE OF INSULIN, UNSPECIFIED WHETHER STAGE 3A OR 3B CKD: Primary | ICD-10-CM

## 2025-01-09 DIAGNOSIS — L03.90 WOUND CELLULITIS: ICD-10-CM

## 2025-01-09 DIAGNOSIS — H10.9 CONJUNCTIVITIS OF LEFT EYE, UNSPECIFIED CONJUNCTIVITIS TYPE: ICD-10-CM

## 2025-01-09 DIAGNOSIS — E11.22 TYPE 2 DIABETES MELLITUS WITH STAGE 3 CHRONIC KIDNEY DISEASE, WITH LONG-TERM CURRENT USE OF INSULIN, UNSPECIFIED WHETHER STAGE 3A OR 3B CKD: Primary | ICD-10-CM

## 2025-01-09 DIAGNOSIS — Z13.6 ENCOUNTER FOR LIPID SCREENING FOR CARDIOVASCULAR DISEASE: ICD-10-CM

## 2025-01-09 PROCEDURE — G2211 COMPLEX E/M VISIT ADD ON: HCPCS | Mod: S$PBB,,, | Performed by: STUDENT IN AN ORGANIZED HEALTH CARE EDUCATION/TRAINING PROGRAM

## 2025-01-09 PROCEDURE — 99215 OFFICE O/P EST HI 40 MIN: CPT | Mod: PBBFAC,27,PO | Performed by: FAMILY MEDICINE

## 2025-01-09 PROCEDURE — 99204 OFFICE O/P NEW MOD 45 MIN: CPT | Mod: S$PBB,,, | Performed by: STUDENT IN AN ORGANIZED HEALTH CARE EDUCATION/TRAINING PROGRAM

## 2025-01-09 PROCEDURE — 99215 OFFICE O/P EST HI 40 MIN: CPT | Mod: S$PBB,,, | Performed by: FAMILY MEDICINE

## 2025-01-09 PROCEDURE — 99999 PR PBB SHADOW E&M-EST. PATIENT-LVL III: CPT | Mod: PBBFAC,,, | Performed by: STUDENT IN AN ORGANIZED HEALTH CARE EDUCATION/TRAINING PROGRAM

## 2025-01-09 PROCEDURE — 99213 OFFICE O/P EST LOW 20 MIN: CPT | Mod: PBBFAC,PO | Performed by: STUDENT IN AN ORGANIZED HEALTH CARE EDUCATION/TRAINING PROGRAM

## 2025-01-09 PROCEDURE — G2211 COMPLEX E/M VISIT ADD ON: HCPCS | Mod: S$PBB,,, | Performed by: FAMILY MEDICINE

## 2025-01-09 PROCEDURE — 99999 PR PBB SHADOW E&M-EST. PATIENT-LVL V: CPT | Mod: PBBFAC,,, | Performed by: FAMILY MEDICINE

## 2025-01-09 RX ORDER — TAMSULOSIN HYDROCHLORIDE 0.4 MG/1
0.4 CAPSULE ORAL NIGHTLY
Qty: 30 CAPSULE | Refills: 11 | Status: SHIPPED | OUTPATIENT
Start: 2025-01-09 | End: 2026-01-09

## 2025-01-09 RX ORDER — CLINDAMYCIN HYDROCHLORIDE 300 MG/1
300 CAPSULE ORAL 3 TIMES DAILY
Qty: 30 CAPSULE | Refills: 0 | Status: SHIPPED | OUTPATIENT
Start: 2025-01-09

## 2025-01-09 RX ORDER — BUPRENORPHINE 10 UG/H
1 PATCH TRANSDERMAL
COMMUNITY
Start: 2025-01-06

## 2025-01-09 RX ORDER — MUPIROCIN 20 MG/G
OINTMENT TOPICAL 3 TIMES DAILY
Qty: 30 G | Refills: 0 | Status: SHIPPED | OUTPATIENT
Start: 2025-01-09 | End: 2025-01-09

## 2025-01-09 RX ORDER — MUPIROCIN 20 MG/G
OINTMENT TOPICAL 3 TIMES DAILY
Qty: 30 G | Refills: 0 | Status: SHIPPED | OUTPATIENT
Start: 2025-01-09

## 2025-01-09 RX ORDER — NEOMYCIN SULFATE, POLYMYXIN B SULFATE AND DEXAMETHASONE 3.5; 10000; 1 MG/ML; [USP'U]/ML; MG/ML
1 SUSPENSION/ DROPS OPHTHALMIC EVERY 6 HOURS
Qty: 5 ML | Refills: 0 | Status: SHIPPED | OUTPATIENT
Start: 2025-01-09

## 2025-01-09 RX ORDER — CLINDAMYCIN HYDROCHLORIDE 300 MG/1
300 CAPSULE ORAL 3 TIMES DAILY
Qty: 30 CAPSULE | Refills: 0 | Status: SHIPPED | OUTPATIENT
Start: 2025-01-09 | End: 2025-01-09

## 2025-01-09 NOTE — Clinical Note
Please follow-up with this patient to make sure that they got an appointment with vascular.  Make sure that [facility] is notified of admission for wound care for his ulcer.  Make a four week follow-up with me so I can follow-up on his complex medical history.

## 2025-01-09 NOTE — PROGRESS NOTES
"[place] - Urology   Clinic Note    Subjective:     Chief Complaint: Benign Prostatic Hypertrophy      History of Present Illness    Zach presents with complaints of frequent urination and a weak urinary stream, persisting for approximately 2 years.    Zach reports frequent urination for approximately 2 years, with daytime urination occurring about 15 times and nighttime urination every 2 hours. His urinary stream is weak with occasional brief periods of slight pressure. These symptoms persist regardless of Bumex use, which he takes for kidney issues and leg edema.    Zach has a history of urinary retention. In 2016, he required a Krause catheter. In 2017, he underwent a UroLift procedure performed by a urologist in [place], [place]. Post-procedure, Dr. Reid prescribed Dutasteride, which the patient has been taking since 2017.    Zach has previously used Tamsulosin (Flomax), though he is uncertain of its efficacy. Currently, he is taking doxazosin, also prescribed by Dr. Reid. Zach's urinary symptoms significantly impact his daily life, disrupting his sleep and various activities.    Zach denies hematuria and dysuria.    Past medical, family, surgical and social history reviewed as documented in chart with pertinent positive medical, family, surgical and social history detailed in HPI.    A review systems was conducted with pertinent positive and negative findings documented in HPI.    Objective:     Estimated body mass index is 28.84 kg/m² as calculated from the following:    Height as of this encounter: 5' 10" (1.778 m).    Weight as of this encounter: 91.2 kg (201 lb).    Vital Signs (Most Recent)       General: No acute distress, well developed.   Head: Normocephalic, atraumatic  CV: no cyanosis  Lungs: normal respiratory effort, no respiratory distress    Labs reviewed below:  Lab Results   Component Value Date    BUN 41 (H) 12/20/2024    CREATININE 3.0 (H) 12/20/2024    WBC 7.06 08/29/2024 "    HGB 11.5 (L) 08/29/2024    HCT 38.9 (L) 08/29/2024     08/29/2024    AST 25 07/03/2024    ALT 24 07/03/2024    ALKPHOS 108 07/03/2024    ALBUMIN 2.7 (L) 12/20/2024    HGBA1C 6.4 (H) 12/20/2024      Assessment:     1. BPH with urinary obstruction    2. OAB (overactive bladder)      Plan:     Assessment & Plan    - Patient has history of urinary retention requiring catheterization and UroLift procedure in 2017  - Current symptoms include frequent urination (15 times/day, every 2 hours at night) and weak stream  - On Dutasteride since 2017 and Doxazosin for BPH management  - UroLift may no longer be effective after 5 years  - Considering cystoscopy to check for exposed clips potentially causing irritation  - Recommending ultrasound to assess current prostate size  - Switched from Doxazosin to Tamsulosin as a more commonly used alpha blocker  - May consider adding bladder medication if symptoms persist after medication change  - Avoiding anticholinergics due to dementia risk in older patients    - Explained prostate enlargement can act as a cutoff valve affecting urination  - Discussed potential for UroLift clips to cause urinary frequency if irritating the bladder  - Informed about different medication classes for prostate and bladder management  - Cautioned about potential lightheadedness with Tamsulosin    - Cystoscopy ordered  - Prostate ultrasound ordered    - Started Tamsulosin (Flomax) - take at night  - Continued Dutasteride at current dose  - Discontinued Doxazosin    - Follow up for cystoscopy and ultrasound results  - Contact the office if experiencing lightheadedness with Tamsulosin  - Follow up if urinary symptoms persist after medication change        The visit today included increased complexity associated with the care of the episodic problem addressed above as well as managing the longitudinal care of the patient due to the serious and/or complex managed problem(s).      Portions of this note  were generated by [employer] and [employer] dictation services    Jerald Lopez MD

## 2025-01-10 ENCOUNTER — PATIENT MESSAGE (OUTPATIENT)
Dept: FAMILY MEDICINE | Facility: CLINIC | Age: OVER 89
End: 2025-01-10
Payer: MEDICARE

## 2025-01-10 ENCOUNTER — E-CONSULT (OUTPATIENT)
Dept: PHARMACY | Facility: CLINIC | Age: OVER 89
End: 2025-01-10
Payer: MEDICARE

## 2025-01-10 DIAGNOSIS — Z71.89 OTHER SPECIFIED COUNSELING: ICD-10-CM

## 2025-01-10 DIAGNOSIS — N18.4 STAGE 4 CHRONIC KIDNEY DISEASE: ICD-10-CM

## 2025-01-10 DIAGNOSIS — L03.90 WOUND CELLULITIS: Primary | ICD-10-CM

## 2025-01-10 PROBLEM — I73.9 PAD (PERIPHERAL ARTERY DISEASE): Chronic | Status: ACTIVE | Noted: 2025-01-09

## 2025-01-10 PROBLEM — L97.922: Status: ACTIVE | Noted: 2025-01-10

## 2025-01-10 PROBLEM — E11.22 TYPE 2 DIABETES MELLITUS WITH DIABETIC CHRONIC KIDNEY DISEASE: Chronic | Status: ACTIVE | Noted: 2024-05-02

## 2025-01-10 PROBLEM — I48.20 CHRONIC ATRIAL FIBRILLATION: Chronic | Status: ACTIVE | Noted: 2024-04-25

## 2025-01-10 PROBLEM — M79.89 SOFT TISSUE MASS: Status: ACTIVE | Noted: 2025-01-10

## 2025-01-10 PROBLEM — Z77.29 EXPOSURE TO POTENTIALLY HAZARDOUS SUBSTANCE: Status: ACTIVE | Noted: 2025-01-10

## 2025-01-10 PROBLEM — I50.30 HEART FAILURE WITH PRESERVED EJECTION FRACTION: Chronic | Status: ACTIVE | Noted: 2017-09-13

## 2025-01-10 PROBLEM — R39.12 WEAK URINE STREAM: Chronic | Status: ACTIVE | Noted: 2025-01-09

## 2025-01-10 PROBLEM — H10.9 CONJUNCTIVITIS OF LEFT EYE: Status: ACTIVE | Noted: 2025-01-10

## 2025-01-10 PROBLEM — I48.92 ATRIAL FLUTTER WITH RAPID VENTRICULAR RESPONSE: Chronic | Status: ACTIVE | Noted: 2020-08-19

## 2025-01-10 NOTE — ASSESSMENT & PLAN NOTE
A1c well-controlled.  Continue current regimen.  We will plan to monitor hemoglobin A1c at designated intervals 3 to 6 months.  I recommend ongoing Education for diabetic diet and exercise protocol.  We will continue to monitor for side effects.    Please be advised of symptoms to monitor for and to notify me immediately if persistent or worsening.  Follow up with Ophthalmology/Optometry and Podiatry at least annually.

## 2025-01-10 NOTE — PROGRESS NOTES
PLAN:      Assessment & Plan  1. Lower extremity ulcers- likely due to PAD with venous stasis.  The ulcers do appear to be infected and are open and draining. He has been experiencing chronic leg edema, which has recently worsened. He has not sought specialist care for this condition, but he did consult with Chel Rocha NP. He has previously seen a vascular specialist. His legs are leaking from the moisture. He underwent an ultrasound during his last visit. He has no history of stent placement in his legs or heart. He has been managing his condition at home with the help of [facility], who have been providing wound care. He has been advised to undergo physical therapy, but he does not believe it is necessary. He does not wear compression stockings and lacks a suitable method to elevate his legs, spending most of his time either in bed or in a chair. He has been dealing with this issue for approximately 6 years. He has been scratching his legs, leading to the development of sores. He has been using clotrimazole cream for itching on his legs. He is advised to wear compression stockings and keep his legs elevated. He is advised to keep his legs covered and avoid scratching. Over-the-counter Benadryl cream or calamine lotion can be used for itching. He should wash his hands frequently to prevent the spread of infection. He is advised to discontinue the use of clotrimazole cream. A referral to a vascular specialist will be made. Blood work will be ordered. A prescription for clindamycin and mupirocin ointment will be sent to [facility] in [place]. Home health services will be arranged to visit him twice a week.    2. Urinary symptoms.  He reports a weak stream, urinary frequency, and incomplete bladder emptying. He has a history of UroLift procedure performed in [place] around 2017 or 2018. A referral to a urologist will be made.    3. Hypertension.  His blood pressure is currently well-controlled. He is  advised to continue monitoring his blood pressure at home and follow up with his cardiologist if it remains high.  Counseled on importance of hypertension disease course, I recommend ongoing Education for DASH-diet and exercise.  Counseled on medication regimen importance of treating high blood pressure.  Please be advised of risk of untreated blood pressure as discussed.  Please advised of ER precautions were given for symptoms of hypertensive urgency and emergency.      4. Soft tissue mass upper back x2   He has a lump on his back that has been present for 4 months. It does not hurt and he is unaware of its presence. An ultrasound will be ordered to further evaluate the lump. If there are any suspicious signs on the ultrasound, the lump will need to be removed and biopsied.    5. Conjunctivitis.  He has been experiencing itching and scratching all over his body. He has been using clotrimazole cream for itching on his legs. He is advised to keep his legs covered and avoid scratching. Over-the-counter Benadryl cream or calamine lotion can be used for itching. He should wash his hands frequently to prevent the spread of infection. He is advised to discontinue the use of clotrimazole cream.  Discussed condition course and signs and symptoms to expect.  Patient advised take anti-inflammatories and or Tylenol for pain or fever.  ER precautions.  Call MD or follow-up to clinic if not improving or worsening symptoms.      PROCEDURE  UroLift procedure was performed in [place] around 2017 or 2018.    Problem List Items Addressed This Visit       Benign prostatic hyperplasia with lower urinary tract symptoms (Chronic)     Referral to urology pending.         Relevant Orders    Urinalysis, Reflex to Urine Culture Urine, Clean Catch (Completed)    Ambulatory referral/consult to Urology    Heart failure with preserved ejection fraction (Chronic)     Potassium is elevated patient is taking potassium early Bumex is on  medication.  Patient needs to follow-up with Cardiology soon.  heart failure precautions.  Referral to home health.         Relevant Orders    E-Consult to Pharmacy    COPD mixed type (Chronic)     Follow-up with Pulmonary.         Type 2 diabetes mellitus with stage 3 chronic kidney disease, with long-term current use of insulin - Primary (Chronic)     Worsening GFR.  Follow-up closely with Nephrology.  Increase hydration with water within heart failure restrictions.         Relevant Orders    Hemoglobin A1C (Completed)    Microalbumin/Creatinine Ratio, Urine (Completed)    Encounter for long-term (current) use of medications (Chronic)     Complete history and physical was completed today.  Complete and thorough medication reconciliation was performed.  Discussed risks and benefits of medications.  Advised patient on orders and health maintenance.  We discussed old records and old labs if available.  Will request any records not available through epic.  Continue current medications listed on your summary sheet.           Relevant Orders    Lipid Panel    Hemoglobin A1C (Completed)    CBC Without Differential (Completed)    Comprehensive Metabolic Panel (Completed)    TSH (Completed)    Chronic atrial fibrillation (Chronic)     Atrial fibrillation precautions.  Follow-up with Cardiology.  ER precautions.         Type 2 diabetes mellitus with diabetic chronic kidney disease (Chronic)     A1c well-controlled.  Continue current regimen.  We will plan to monitor hemoglobin A1c at designated intervals 3 to 6 months.  I recommend ongoing Education for diabetic diet and exercise protocol.  We will continue to monitor for side effects.    Please be advised of symptoms to monitor for and to notify me immediately if persistent or worsening.  Follow up with Ophthalmology/Optometry and Podiatry at least annually.           Relevant Orders    Hemoglobin A1C (Completed)    Microalbumin/Creatinine Ratio, Urine (Completed)    PAD  (peripheral artery disease) (Chronic)     Referral to vascular ASAP.         Relevant Orders    Ambulatory referral/consult to Vascular Surgery    Ambulatory referral/consult to Home Health    Ambulatory referral/consult to Vascular Surgery    Weak urine stream (Chronic)    Relevant Orders    Urinalysis, Reflex to Urine Culture Urine, Clean Catch (Completed)    Wound cellulitis    Relevant Medications    clindamycin (CLEOCIN) 300 MG capsule    Other Relevant Orders    Ambulatory referral/consult to Wound Clinic    Ambulatory referral/consult to Home Health    Urinary frequency    Relevant Orders    Urinalysis, Reflex to Urine Culture Urine, Clean Catch (Completed)    Wound of left lower extremity     Treating wound with antibiotics and wound care.  Another referral to vascular has been placed patient and family member is aware of the referral and will they will reach out to insurance about providers in network.  ER precautions for severe symptoms.         Relevant Medications    mupirocin (BACTROBAN) 2 % ointment    Non-pressure chronic ulcer of unspecified part of left lower leg with fat layer exposed    Conjunctivitis of left eye     Start drops.  Wash hands frequently.         Relevant Medications    neomycin-polymyxin-dexamethasone (MAXITROL) 3.5mg/mL-10,000 unit/mL-0.1 % DrpS    Soft tissue mass     Check ultrasound.  Reassurance provided however patient states that they were recently noted over the last four months.  Referral to General surgery if needed.  Likely benign.         Relevant Orders    US Soft Tissue Chest_Upper Back     Other Visit Diagnoses       Encounter for lipid screening for cardiovascular disease        Relevant Orders    Lipid Panel    Screening PSA (prostate specific antigen)        Relevant Orders    PSA, SCREENING (Completed)    CKD stage 4 due to type 2 diabetes mellitus        Relevant Orders    E-Consult to Pharmacy        The natural history of prostate cancer and ongoing  controversy regarding screening and potential treatment outcomes of prostate cancer has been discussed with the patient. The meaning of a false positive PSA and a false negative PSA has been discussed. He indicates understanding of the limitations of this screening test and wishes to proceed with screening PSA testing.  Future Appointments       Date Provider Specialty Appt Notes    1/16/2025  Radiology     1/16/2025  Radiology PELVIC US    2/6/2025  Lab [facility]    2/6/2025  Lab [facility]    2/6/2025 Genevieve Whitaker FNP Diabetes Dexcom, 3 months    2/18/2025 Jerald Lopez MD Urology CYSTO for BPH    2/24/2025 Ramy Troncoso MD Nephrology 2 mos    4/1/2025 Bret Hill MD Cardiology  Arrive at: Telehealth 4 month f/u           Medication Management for assessment above:   Medication List with Changes/Refills   New Medications    CLINDAMYCIN (CLEOCIN) 300 MG CAPSULE    Take 1 capsule (300 mg total) by mouth 3 (three) times daily.    NEOMYCIN-POLYMYXIN-DEXAMETHASONE (MAXITROL) 3.5MG/ML-10,000 UNIT/ML-0.1 % DRPS    Place 1 drop into the left eye every 6 (six) hours.    TAMSULOSIN (FLOMAX) 0.4 MG CAP    Take 1 capsule (0.4 mg total) by mouth every evening.   Current Medications    ALBUTEROL (ACCUNEB) 1.25 MG/3 ML NEBU    Take 3 mLs (1.25 mg total) by nebulization every 6 (six) hours as needed (cough, shortness of breath). Rescue    APIXABAN (ELIQUIS) 2.5 MG TAB    2.5 mg 2 (two) times daily.    ATORVASTATIN (LIPITOR) 20 MG TABLET    20 mg.    BUMETANIDE (BUMEX) 1 MG TABLET    Take 1 mg by mouth daily as needed.    BUPRENORPHINE 10 MCG/HR (BUTRANS) WEEKLY PATCH    Place 1 patch onto the skin every 7 days.    CALCITRIOL (ROCALTROL) 0.25 MCG CAP    Take 1 capsule (0.25 mcg total) by mouth once a week. Active vitamin D    CARVEDILOL (COREG) 3.125 MG TABLET    Take 6.25 mg by mouth once daily.    CITALOPRAM (CELEXA) 20 MG TABLET    20 mg.    DUTASTERIDE (AVODART) 0.5 MG CAPSULE    Take 1 capsule by  mouth once daily.    EMPAGLIFLOZIN (JARDIANCE) 25 MG TABLET    Take 1 tablet (25 mg total) by mouth once daily.    FLUOCINONIDE 0.05% (LIDEX) 0.05 % CREAM    APPLY SMALL AMOUNT TOPICALLY TWICE A DAY AS NEEDED ITCHING    GLUCAGON (GVOKE HYPOPEN 2-PACK) 1 MG/0.2 ML ATIN    Inject 1 mg into the skin as needed (SEVERE HYPOGLYCEMIA).    GUAIFENESIN/PHENYLEPHRINE HCL (MUCINEX COLD ORAL)    Take 400 mg by mouth.    INSULIN GLARGINE U-100, LANTUS, 100 UNIT/ML (3 ML) SUBQ INPN PEN    Inject 5 Units into the skin once daily.    LEVOTHYROXINE (SYNTHROID) 200 MCG TABLET    Take 1 tablet by mouth once daily.    PREGABALIN (LYRICA) 25 MG CAPSULE    Take 1 capsule (25 mg total) by mouth 2 (two) times daily.    ROPINIROLE (REQUIP) 0.25 MG TABLET    0.25 mg.    SEMAGLUTIDE (OZEMPIC) 1 MG/DOSE (4 MG/3 ML)    Inject 1 mg into the skin every 7 days.    TIOTROPIUM (SPIRIVA) 18 MCG INHALATION CAPSULE    Inhale 1 capsule (18 mcg total) into the lungs once daily. Controller    TRAMADOL (ULTRAM) 50 MG TABLET    Take 1 tablet (50 mg total) by mouth every 8 (eight) hours as needed for Pain.    TRIAMCINOLONE ACETONIDE 0.1% (KENALOG) 0.1 % CREAM    APPLY SMALL AMOUNT TOPICALLY TWICE A DAY AS NEEDED FOR ITCHING    VITAMIN D (VITAMIN D3) 1000 UNITS TAB    25 mcg.   Changed and/or Refilled Medications    Modified Medication Previous Medication    MUPIROCIN (BACTROBAN) 2 % OINTMENT mupirocin (BACTROBAN) 2 % ointment       Apply topically 3 (three) times daily.    Apply topically 3 (three) times daily.   Discontinued Medications    ZINC GLYCINATE 30 MG CAP    Take 30 mg by mouth once daily.       Bao Mcarthur M.D.  ==========================================================================  Subjective:   Patient ID: Zach Lund is a 89 y.o. male.  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   Chief Complaint: Follow-up      History of Present Illness  The patient is a pleasant 89-year-old male who presents for a 6-month follow-up for chronic conditions. He is also having some issues with ulcers on his legs. He is accompanied by his son.    He has been experiencing chronic leg edema, which has recently worsened. He has not sought specialist care for this condition, but he did consult with Chel Rocha NP.  His legs are leaking from the moisture. He underwent an ultrasound during his last visit. He has no history of stent placement in his legs or heart. He has been managing his condition at home with the help of [facility], who have been providing wound care. He has been advised to undergo physical therapy, but he does not believe it is necessary. He does not wear compression stockings and lacks a suitable method to elevate his legs, spending most of his time either in bed or in a chair. He has been dealing with this issue for approximately 6 years. He has been scratching his legs, leading to the development of sores. He has been using clotrimazole cream for itching on his legs. He was previously treated with mupirocin ointment and antibiotic for a staph infection in his legs, which was confirmed by culture.    He has been experiencing urinary complaints, including a weak stream, incomplete bladder emptying, and urinary frequency. He  "has expressed a desire for a referral to urology. He has a history of UroLift procedure performed in [place] around 2017 or 2018.    He has been diagnosed with conjunctivitis and has been experiencing itching and scratching all over his body. He has been using clotrimazole cream for itching on his legs.    He has been experiencing hypertension, which was managed by doubling the dose of carvedilol. Despite this, his blood pressure remained in the 160s when tested last week.    He has been experiencing pain in his back, which has been present for about 4 months. He reports no associated weight loss.    He has been experiencing itching and scratching all over his body. He has been using clotrimazole cream for itching on his legs.    MEDICATIONS  atorvastatin, carvedilol, bumetanide, mupirocin, clindamycin    Problem List Items Addressed This Visit       Benign prostatic hyperplasia with lower urinary tract symptoms (Chronic)    Overview     Uncontrolled.  Patient had history of UroLift.  Requesting referral to Urology.         Current Assessment & Plan     Referral to urology pending.         Heart failure with preserved ejection fraction (Chronic)    Overview     January 2025:   Lab Results   Component Value Date    K 5.2 (H) 01/09/2025         Previous history:  Chronic. Control uncertain. Followed by cardiology, Dr. Hill. Currently taking Bumex 2 mg in the morning and 1 after lunch. He was on lasix in the past but states medication was "too strong". Hx of CKD (followed by nephrology). He does have fluctuating weight and swelling to BLE. Chronic dyspnea on exertion. No recent echo for review. Last Echo 11/9/2020 the left ventricular ejection fraction is 60-65% by visual estimation.         Current Assessment & Plan     Potassium is elevated patient is taking potassium early Bumex is on medication.  Patient needs to follow-up with Cardiology soon.  heart failure precautions.  Referral to home health.         COPD " mixed type (Chronic)    Overview     Formatting of this note might be different from the original.   Former smoker, 10.00 pack-year      Last Assessment & Plan:    Formatting of this note might be different from the original.   COPD is unchanged.   COPD information handout given.      Continue albuterol plan.         Current Assessment & Plan     Follow-up with Pulmonary.         Type 2 diabetes mellitus with stage 3 chronic kidney disease, with long-term current use of insulin - Primary (Chronic)    Overview     BMP  Lab Results   Component Value Date     01/09/2025    K 5.2 (H) 01/09/2025     01/09/2025    CO2 24 01/09/2025    BUN 59 (H) 01/09/2025    CREATININE 3.5 (H) 01/09/2025    CALCIUM 8.0 (L) 01/09/2025    ANIONGAP 9 01/09/2025    EGFRNORACEVR 15.8 (A) 01/09/2025         Formatting of this note is different from the original.   Lab Results    Component Value Date/Time     HGBA1C 6.2 (H) 01/26/2023 08:55 AM     GFR 39 (L) 12/09/2022 08:40 AM     GFRCNAFA 34 (L) 02/17/2022 01:44 PM     MICROALBCREA 22.4 10/31/2022 12:11 PM     LDLCALC 43 02/17/2022 01:44 PM       Last Eye Exam: 2/2/2023    Last Foot Exam: 5/18/2022       Last Assessment & Plan:    Formatting of this note might be different from the original.   Diabetes is unchanged.    Continue current treatment regimen.   Diabetes will be reassessed in 6 months.      A1c 6.2%. Discussed diet control - Lantus 10 units.         Current Assessment & Plan     Worsening GFR.  Follow-up closely with Nephrology.  Increase hydration with water within heart failure restrictions.         Encounter for long-term (current) use of medications (Chronic)    Overview     CHRONIC. Stable. Compliant with medications for managed conditions. See medication list. No SE reported.   Routine lab analysis is being monitored. Refills were addressed.  Lab Results   Component Value Date    WBC 7.76 01/09/2025    HGB 11.3 (L) 01/09/2025    HCT 37.2 (L) 01/09/2025      "(H) 01/09/2025     01/09/2025         Chemistry        Component Value Date/Time     01/09/2025 1150    K 5.2 (H) 01/09/2025 1150     01/09/2025 1150    CO2 24 01/09/2025 1150    BUN 59 (H) 01/09/2025 1150    CREATININE 3.5 (H) 01/09/2025 1150     (H) 01/09/2025 1150        Component Value Date/Time    CALCIUM 8.0 (L) 01/09/2025 1150    ALKPHOS 82 01/09/2025 1150    AST 22 01/09/2025 1150    ALT 18 01/09/2025 1150    BILITOT 0.3 01/09/2025 1150          Lab Results   Component Value Date    TSH 5.058 (H) 01/09/2025    FREET4 1.04 01/09/2025              Current Assessment & Plan     Complete history and physical was completed today.  Complete and thorough medication reconciliation was performed.  Discussed risks and benefits of medications.  Advised patient on orders and health maintenance.  We discussed old records and old labs if available.  Will request any records not available through epic.  Continue current medications listed on your summary sheet.           Chronic atrial fibrillation (Chronic)    Overview     Chronic.  Control is uncertain.  Patient on Eliquis.  Patient follows with Cardiology.         Current Assessment & Plan     Atrial fibrillation precautions.  Follow-up with Cardiology.  ER precautions.         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   HgA1C Testing and Control   Lab Results   Component Value Date    HGBA1C 6.5 (H) 01/09/2025      Annually/Less than 8% Yes   Lipid profile : 01/15/2024 Annually No   LDL control No results found for: "LDLCALC" Annually/Less than 100 mg/dl  No   Nephropathy screening Lab Results   Component Value Date    LABMICR 1565.0 01/09/2025     Lab Results   Component Value Date    PROTEINUA 3+ (A) 01/09/2025     Lab Results   Component Value Date    UTPCR 8.69 " (H) 12/20/2024      Annually Yes   Blood pressure BP Readings from Last 1 Encounters:   01/09/25 136/78    Less than 140/90 Yes   Dilated retinal exam : 08/29/2024 Annually Yes   Foot exam   Most Recent Foot Exam Date: Not Found Annually No     Diabetes Medications               empagliflozin (JARDIANCE) 25 mg tablet Take 1 tablet (25 mg total) by mouth once daily.    glucagon (GVOKE HYPOPEN 2-PACK) 1 mg/0.2 mL AtIn Inject 1 mg into the skin as needed (SEVERE HYPOGLYCEMIA).    insulin glargine U-100, Lantus, 100 unit/mL (3 mL) SubQ InPn pen Inject 5 Units into the skin once daily.    semaglutide (OZEMPIC) 1 mg/dose (4 mg/3 mL) Inject 1 mg into the skin every 7 days.                   Current Assessment & Plan     A1c well-controlled.  Continue current regimen.  We will plan to monitor hemoglobin A1c at designated intervals 3 to 6 months.  I recommend ongoing Education for diabetic diet and exercise protocol.  We will continue to monitor for side effects.    Please be advised of symptoms to monitor for and to notify me immediately if persistent or worsening.  Follow up with Ophthalmology/Optometry and Podiatry at least annually.           PAD (peripheral artery disease) (Chronic)    Overview     Narrative & Impression  EXAM: US LOWER EXTREMITY ARTERIES BILATERAL     CLINICAL HISTORY: [M79.604]-Pain in right leg./[M79.605]-Pain in left leg.     COMPARISON:  No relevant prior studies.     TECHNIQUE: Real-time sonography bilateral lower extremity arteries with color and spectral Doppler.     FINDINGS:  Limited examination due to restless legs.  Waveforms and peak systolic velocities are as follows (all velocities provided in centimeters per second):     Right lower extremity:  Common femoral artery: 78, triphasic  Deep femoral artery: 53, biphasic  Proximal SFA: 71, triphasic  Mid SFA: 111, triphasic  Distal SFA: 101, triphasic     Popliteal artery: 81, triphasic  Posterior tibial artery: 164, monophasic  Anterior  tibial artery: 27, monophasic proximally.  Occluded within its mid and distal portions.  Dorsalis pedis artery: 60, monophasic, supplied through collaterals.     Left lower extremity:  Common femoral artery: 118, triphasic  Deep femoral artery: Not evaluated  Proximal SFA: 101, triphasic  Mid SFA: 89, triphasic  Distal SFA: 130, triphasic     Popliteal artery: 73, triphasic  Posterior tibial artery: 390, monophasic  Anterior tibial artery: 81, biphasic proximally.  Occluded within its mid and distal portions.  Dorsalis pedis artery: 152, monophasic, supplied through collaterals.        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  Flow present within the bilateral dorsalis pedis arteries, likely through collaterals.  Elevated velocity within the left dorsalis pedis artery may represent additional stenosis within this vessel.     Elevated velocities within the bilateral posterior tibial arteries with monophasic flow, compatible with hemodynamically significant stenosis within these vessels.     Left deep femoral artery was not evaluated.     Report Date: 7/15/2024 4:40 PM     Finalized on: 7/15/2024 4:40 PM By:  Ariadne Garcia MD  [facility]# [id number]      2024-07-15 16:42:20.120    [facility]        Exam Ended: 07/15/24 15:46 CDT            Current Assessment & Plan     Referral to vascular ASAP.         Weak urine stream (Chronic)    Wound cellulitis    Urinary frequency    Wound of left lower extremity    Overview     January 2025:  Recurrence of wound to the left lower extremity.  Patient with abnormal arterial ultrasound.  Referral was placed to vascular.  Patient has not seen vascular yet.    Previous history:  New wound to left lower leg. Open, weeping fluid. There is minimal surrounding redness, warmth drainage. Denies fever, chills. Chronic bilateral extremity edema w/ venous stasis and history of CHF. He is diabetic. He does have leg pain with ambulation.          Current  Assessment & Plan     Treating wound with antibiotics and wound care.  Another referral to vascular has been placed patient and family member is aware of the referral and will they will reach out to insurance about providers in network.  ER precautions for severe symptoms.         Non-pressure chronic ulcer of unspecified part of left lower leg with fat layer exposed    Conjunctivitis of left eye    Overview     See HPI.         Current Assessment & Plan     Start drops.  Wash hands frequently.         Soft tissue mass    Current Assessment & Plan     Check ultrasound.  Reassurance provided however patient states that they were recently noted over the last four months.  Referral to General surgery if needed.  Likely benign.          Other Visit Diagnoses       Encounter for lipid screening for cardiovascular disease        Screening PSA (prostate specific antigen)        CKD stage 4 due to type 2 diabetes mellitus                 Review of patient's allergies indicates:   Allergen Reactions    Iodinated contrast media Anaphylaxis    Iodine Anaphylaxis     Breathing issue    Lobster Anaphylaxis     Current Outpatient Medications   Medication Instructions    albuterol (ACCUNEB) 1.25 mg, Nebulization, Every 6 hours PRN, Rescue    apixaban (ELIQUIS) 2.5 mg, 2 times daily    atorvastatin (LIPITOR) 20 mg    bumetanide (BUMEX) 1 mg, Daily PRN    buprenorphine 10 mcg/hr (BUTRANS) weekly patch 1 patch, Every 7 days    calcitRIOL (ROCALTROL) 0.25 mcg, Oral, Weekly, Active vitamin D    carvediloL (COREG) 6.25 mg, Daily    citalopram (CELEXA) 20 mg    clindamycin (CLEOCIN) 300 mg, Oral, 3 times daily    dutasteride (AVODART) 0.5 mg capsule 1 capsule, Daily    empagliflozin (JARDIANCE) 25 mg, Oral, Daily    fluocinonide 0.05% (LIDEX) 0.05 % cream APPLY SMALL AMOUNT TOPICALLY TWICE A DAY AS NEEDED ITCHING    guaifenesin/phenylephrine HCl (MUCINEX COLD ORAL) 400 mg    GVOKE HYPOPEN 2-PACK 1 mg, Subcutaneous, As needed (PRN)     "insulin glargine U-100 (Lantus) 5 Units, Daily    levothyroxine (SYNTHROID) 200 MCG tablet 1 tablet, Daily    mupirocin (BACTROBAN) 2 % ointment Topical (Top), 3 times daily    neomycin-polymyxin-dexamethasone (MAXITROL) 3.5mg/mL-10,000 unit/mL-0.1 % DrpS 1 drop, Left Eye, Every 6 hours    pregabalin (LYRICA) 25 mg, Oral, 2 times daily    rOPINIRole (REQUIP) 0.25 mg    semaglutide (OZEMPIC) 1 mg, Subcutaneous, Every 7 days    tamsulosin (FLOMAX) 0.4 mg, Oral, Nightly    tiotropium (SPIRIVA) 18 mcg, Inhalation, Daily, Controller    traMADoL (ULTRAM) 50 mg, Oral, Every 8 hours PRN    triamcinolone acetonide 0.1% (KENALOG) 0.1 % cream APPLY SMALL AMOUNT TOPICALLY TWICE A DAY AS NEEDED FOR ITCHING    vitamin D (VITAMIN D3) 25 mcg      I have reviewed the PMH, social history, FamilyHx, surgical history, allergies and medications documented / confirmed by the patient at the time of this visit.  Review of Systems   Constitutional:  Positive for fatigue. Negative for chills, fever and unexpected weight change.   HENT:  Negative for ear pain and sore throat.    Eyes:  Negative for redness and visual disturbance.   Respiratory:  Negative for cough and shortness of breath.    Cardiovascular:  Positive for leg swelling. Negative for chest pain and palpitations.   Gastrointestinal:  Negative for nausea and vomiting.   Endocrine: Negative for cold intolerance and heat intolerance.   Genitourinary:  Negative for difficulty urinating and hematuria.   Musculoskeletal:  Positive for arthralgias. Negative for myalgias.   Skin:  Positive for rash and wound.   Allergic/Immunologic: Negative for environmental allergies and food allergies.   Neurological:  Positive for weakness. Negative for headaches.   Hematological:  Negative for adenopathy. Bruises/bleeds easily.   Psychiatric/Behavioral:  Negative for sleep disturbance. The patient is not nervous/anxious.      Objective:   /78   Pulse (!) 55   Ht 5' 10" (1.778 m)   Wt 91.2 " kg (201 lb)   SpO2 95%   BMI 28.84 kg/m²   Physical Exam  Vitals and nursing note reviewed.   Constitutional:       General: He is not in acute distress.     Appearance: He is well-developed. He is not diaphoretic.      Comments:   Present with son, sitting in wheelchair   HENT:      Head: Normocephalic and atraumatic.      Right Ear: Tympanic membrane, ear canal and external ear normal. There is no impacted cerumen.      Left Ear: Tympanic membrane, ear canal and external ear normal. There is no impacted cerumen.      Nose: Nose normal. No rhinorrhea.   Eyes:      General:         Left eye: Discharge present.     Extraocular Movements: Extraocular movements intact.      Conjunctiva/sclera:      Right eye: Right conjunctiva is injected.      Left eye: Left conjunctiva is injected.      Pupils: Pupils are equal, round, and reactive to light.   Neck:      Vascular: No carotid bruit.   Cardiovascular:      Rate and Rhythm: Rhythm irregular.      Pulses: Normal pulses.      Heart sounds: Murmur heard.   Pulmonary:      Effort: Pulmonary effort is normal. No respiratory distress.      Breath sounds: No wheezing.   Abdominal:      General: Bowel sounds are normal.      Palpations: Abdomen is soft.   Musculoskeletal:         General: Tenderness and deformity present. Normal range of motion.      Cervical back: Normal range of motion and neck supple.      Right lower leg: Edema present.      Left lower leg: Edema present.   Lymphadenopathy:      Cervical: No cervical adenopathy.   Skin:     General: Skin is warm and dry.      Capillary Refill: Capillary refill takes less than 2 seconds.      Findings: Lesion and rash present.   Neurological:      General: No focal deficit present.      Mental Status: He is alert and oriented to person, place, and time. Mental status is at baseline.      Cranial Nerves: No cranial nerve deficit.      Motor: Weakness present.      Gait: Gait abnormal.   Psychiatric:         Attention and  Perception: He is attentive.         Mood and Affect: Mood normal. Mood is not anxious or depressed. Affect is not labile, blunt, angry or inappropriate.         Speech: He is communicative. Speech is not rapid and pressured, delayed, slurred or tangential.         Behavior: Behavior normal. Behavior is not agitated, slowed, aggressive, withdrawn, hyperactive or combative.         Thought Content: Thought content normal. Thought content is not paranoid or delusional. Thought content does not include homicidal or suicidal ideation. Thought content does not include homicidal or suicidal plan.         Cognition and Memory: Memory is not impaired.         Judgment: Judgment normal. Judgment is not impulsive or inappropriate.                     Physical Exam      Results  Imaging  Ultrasound of the arterial lower extremity done in July showed abnormal evidence of blockages in the lower extremities. Ultrasound of the lower extremity venous showed no blood clots and greater saphenous vein reflux is only definitely present on the right leg.    Assessment:     1. Type 2 diabetes mellitus with stage 3 chronic kidney disease, with long-term current use of insulin, unspecified whether stage 3a or 3b CKD    2. Encounter for long-term (current) use of medications    3. Encounter for lipid screening for cardiovascular disease    4. Screening PSA (prostate specific antigen)    5. Urinary frequency    6. Weak urine stream    7. Benign prostatic hyperplasia with urinary frequency    8. PAD (peripheral artery disease)    9. Wound cellulitis    10. Wound of left lower extremity, initial encounter    11. COPD mixed type    12. Soft tissue mass    13. Conjunctivitis of left eye, unspecified conjunctivitis type    14. Non-pressure chronic ulcer of unspecified part of left lower leg with fat layer exposed    15. Chronic heart failure with preserved ejection fraction    16. Chronic atrial fibrillation    17. Type 2 diabetes mellitus with  stage 4 chronic kidney disease, with long-term current use of insulin    18. CKD stage 4 due to type 2 diabetes mellitus      MDM:   High medical complexity.  Moderate to high risk.  Total time: 45 minutes.  This includes total time spent on the encounter, which includes face to face time and non-face to face time preparing to see the patient (eg, review of previous medical records, tests), Obtaining and/or reviewing separately obtained history, documenting clinical information in the electronic or other health record, independently interpreting results (not separately reported)/communicating results to the patient/family/caregiver, and/or care coordination (not separately reported).    I have Reviewed and summarized old records.  I have performed thorough medication reconciliation today and discussed risk and benefits of medications.  I have reviewed labs and discussed with patient.  All questions were answered.  I am requesting old records and will review them once they are available.  Urology vascular, pain management  Visit today included increased complexity associated with the care of the episodic problem see above assessment addressed and managing the longitudinal care of the patient due to the serious and/or complex managed problem(s) see above.    I have signed for the following orders AND/OR meds.  Orders Placed This Encounter   Procedures    US Soft Tissue Chest_Upper Back     Standing Status:   Future     Standing Expiration Date:   1/9/2026     Order Specific Question:   May the Radiologist modify the order per protocol to meet the clinical needs of the patient?     Answer:   Yes     Order Specific Question:   Release to patient     Answer:   Immediate    Lipid Panel     Standing Status:   Future     Standing Expiration Date:   3/10/2026    Hemoglobin A1C     Standing Status:   Future     Number of Occurrences:   1     Standing Expiration Date:   3/10/2026    CBC Without Differential     Standing  Status:   Future     Number of Occurrences:   1     Standing Expiration Date:   3/10/2026    Comprehensive Metabolic Panel     Standing Status:   Future     Number of Occurrences:   1     Standing Expiration Date:   3/10/2026    TSH     Standing Status:   Future     Number of Occurrences:   1     Standing Expiration Date:   3/10/2026    Microalbumin/Creatinine Ratio, Urine     Standing Status:   Future     Number of Occurrences:   1     Standing Expiration Date:   3/10/2026     Order Specific Question:   Specimen Source     Answer:   Urine    Urinalysis, Reflex to Urine Culture Urine, Clean Catch     Standing Status:   Future     Number of Occurrences:   1     Standing Expiration Date:   3/10/2026     Order Specific Question:   Preferred Collection Type     Answer:   Urine, Clean Catch     Order Specific Question:   Specimen Source     Answer:   Urine    PSA, SCREENING     Standing Status:   Future     Number of Occurrences:   1     Standing Expiration Date:   4/9/2026     Order Specific Question:   Send normal result to authorizing provider's In Basket if patient is active on MyChart:     Answer:   Yes    Ambulatory referral/consult to Urology     Standing Status:   Future     Number of Occurrences:   1     Standing Expiration Date:   2/9/2026     Referral Priority:   Routine     Referral Type:   Consultation     Referral Reason:   Specialty Services Required     Requested Specialty:   Urology     Number of Visits Requested:   1    Ambulatory referral/consult to Wound Clinic     Standing Status:   Future     Standing Expiration Date:   2/9/2026     Referral Priority:   Routine     Referral Type:   Consultation     Referral Reason:   Specialty Services Required     Requested Specialty:   Wound Care     Number of Visits Requested:   1    Ambulatory referral/consult to Vascular Surgery     Standing Status:   Future     Standing Expiration Date:   2/9/2026     Referral Priority:   Routine     Referral Type:    Consultation     Referral Reason:   Specialty Services Required     Requested Specialty:   Vascular Surgery     Number of Visits Requested:   1    Ambulatory referral/consult to Home Health     Standing Status:   Future     Standing Expiration Date:   2/9/2026     Referral Priority:   Routine     Referral Type:   Home Health     Referral Reason:   Specialty Services Required     Requested Specialty:   Home Health Services     Number of Visits Requested:   1    Ambulatory referral/consult to Vascular Surgery     Standing Status:   Future     Standing Expiration Date:   2/9/2026     Referral Priority:   Routine     Referral Type:   Consultation     Referral Reason:   Specialty Services Required     Referred to Provider:   Antonio Mahmood MD     Requested Specialty:   Vascular Surgery     Number of Visits Requested:   1    E-Consult to Pharmacy     Order Specific Question:   Consent has been obtained from patient, and patient is aware of applicable cost? Pending specialist evaluation, I will follow up with the patient.     Answer:   Yes     Order Specific Question:   Reason for E-Consult?     Answer:   Renal Dose Adjustment     Order Specific Question:   What is your clinical question?     Answer:   Please assist with med rec, renal dose adjustment.  Recently started antibiotic.  Patient had labs yesterday.  Complex medical history with heart failure and CKD.  He was referred to home health.  [facility]     Order Specific Question:   Total time spent preparing for the referral and/or communicating with the consulting physician:     Answer:   <16 minutes: no billing code should be submitted     Medications Ordered This Encounter   Medications    clindamycin (CLEOCIN) 300 MG capsule     Sig: Take 1 capsule (300 mg total) by mouth 3 (three) times daily.     Dispense:  30 capsule     Refill:  0    mupirocin (BACTROBAN) 2 % ointment     Sig: Apply topically 3 (three) times daily.     Dispense:  30 g      Refill:  0    neomycin-polymyxin-dexamethasone (MAXITROL) 3.5mg/mL-10,000 unit/mL-0.1 % DrpS     Sig: Place 1 drop into the left eye every 6 (six) hours.     Dispense:  5 mL     Refill:  0        Follow up in about 4 weeks (around 2/6/2025), or if symptoms worsen or fail to improve, for Med refills, LAB RESULTS, Follow-up on condition.  Future Appointments       Date Provider Specialty Appt Notes    1/16/2025  Radiology     1/16/2025  Radiology PELVIC US    2/6/2025  Lab [facility]    2/6/2025  [facility]    2/6/2025 Genevieve Whitaker FNP Diabetes Dexcom, 3 months    2/18/2025 Jerald Lopez MD Urology CYSTO for BPH    2/24/2025 Ramy Troncoso MD Nephrology 2 mos    4/1/2025 Bret Hill MD Cardiology  Arrive at: Telehealth 4 month f/u          If no improvement in symptoms or symptoms worsen, advised to call/follow-up at clinic or go to ER. Patient voiced understanding and all questions/concerns were addressed.   DISCLAIMER: This note was compiled by using a speech recognition dictation system and therefore please be aware that typographical / speech recognition errors can and do occur.  Please contact me if you see any errors specifically.  Consent was obtained for ROSAL[employer] recording system prior to the visit.    This note was generated with the assistance of ambient listening technology. Verbal consent was obtained by the patient and accompanying visitor(s) for the recording of patient appointment to facilitate this note. I attest to having reviewed and edited the generated note for accuracy, though some syntax or spelling errors may persist. Please contact the author of this note for any clarification.    Bao Mcarthur M.D.       Office: [phone] [address] [place] [address]  FAX: [phone]

## 2025-01-10 NOTE — ASSESSMENT & PLAN NOTE
Check ultrasound.  Reassurance provided however patient states that they were recently noted over the last four months.  Referral to General surgery if needed.  Likely benign.

## 2025-01-10 NOTE — PATIENT INSTRUCTIONS
Follow up in about 4 weeks (around 2/6/2025), or if symptoms worsen or fail to improve, for Med refills, LAB RESULTS, Follow-up on condition.     Dear patient,   As a result of recent federal legislation (The Federal Cures Act), you may receive lab or pathology results from your visit in your [facility] account before your physician is able to contact you. Your physician or their representative will relay the results to you with their recommendations at their soonest availability.     If no improvement in symptoms or symptoms worsen, please be advised to call MD, follow-up at clinic and/or go to ER if becomes severe.    Bao Mcarthur M.D.        We Offer TELEHEALTH & Same Day Appointments!   Book your Telehealth appointment with me through my nurse or   Clinic appointments on [employer]!    [address] [place] [address]    Office: [phone]   FAX: [phone]    Check out my Facebook Page and Follow Me at: [email or web address]    Check out my website at [employer] by clicking on: [email or web address]    To Schedule appointments online, go to [employer]: [email or web address]

## 2025-01-10 NOTE — ASSESSMENT & PLAN NOTE
Treating wound with antibiotics and wound care.  Another referral to vascular has been placed patient and family member is aware of the referral and will they will reach out to insurance about providers in network.  ER precautions for severe symptoms.

## 2025-01-10 NOTE — ASSESSMENT & PLAN NOTE
Potassium is elevated patient is taking potassium early Bumex is on medication.  Patient needs to follow-up with Cardiology soon.  heart failure precautions.  Referral to home health.

## 2025-01-10 NOTE — CONSULTS
[facility] - Pharmacy/Wellness  Response for E-Consult     Patient Name: Zach Lund  MRN: [id number]  Primary Care Provider: Bao Mcarthur MD   Requesting Provider: Bao Mcarthur MD  E-Consult to Pharmacy  Consult performed by: Vandana Ventura PharmD  Consult ordered by: Bao Mcarthur MD  Reason for consult: Renal dosing recommendation          Recommendation:   Cleocin does not require renal dosing adjustments - cellulitis treatment is typically 300 mg QID for 5+ days (up to 14 days)  Increase Bumex to 2 mg daily  Decrease Celexa to 10 mg daily    Contingency that warrants a repeat eConsult or referral: n/a    Total time of Consultation: 15 minute    I did not speak to the requesting provider verbally about this.     *This eConsult is based on the clinical data available to me and is furnished without benefit of a physical examination. The eConsult will need to be interpreted in light of any clinical issues or changes in patient status not available to me at the time of filing this eConsults. Significant changes in patient condition or level of acuity should result in immediate formal consultation and reevaluation. Please alert me if you have further questions.    Thank you for this eConsult referral.     Vandana Ventura PharmD  [facility]

## 2025-01-10 NOTE — ASSESSMENT & PLAN NOTE
Worsening GFR.  Follow-up closely with Nephrology.  Increase hydration with water within heart failure restrictions.

## 2025-01-13 ENCOUNTER — PATIENT MESSAGE (OUTPATIENT)
Dept: FAMILY MEDICINE | Facility: CLINIC | Age: OVER 89
End: 2025-01-13
Payer: MEDICARE

## 2025-01-14 ENCOUNTER — TELEPHONE (OUTPATIENT)
Dept: FAMILY MEDICINE | Facility: CLINIC | Age: OVER 89
End: 2025-01-14
Payer: MEDICARE

## 2025-01-14 NOTE — TELEPHONE ENCOUNTER
----- Message from Vistaar sent at 1/14/2025  3:08 PM CST -----  Contact: [facility] [facility]  .Type:  Needs Medical Advice    Who Called: [facility]  Would the patient rather a call back or a response via [facility]? Call back  Best Call Back Number: [phone]  Additional Information: Santyl need to be written as a prescription.

## 2025-01-14 NOTE — TELEPHONE ENCOUNTER
Josette marquez. Spoke w/Rosemarie and she stated that she had to do some changes to the pt wound order that Dr. Mcarthur sent over. The  will be sending over a communication order w/the changes so that Dr. Mcarthur can review and sign back if agreeable. Fax number confirmed.

## 2025-01-14 NOTE — TELEPHONE ENCOUNTER
----- Message from Eda sent at 1/14/2025  3:24 PM CST -----  Contact: Rosemarie with [facility] phone [phone]  Calling to report she had to change the patient wound care, due to lack of supplies. Please call the nurse to discuss. Thanks.

## 2025-01-16 ENCOUNTER — TELEPHONE (OUTPATIENT)
Dept: FAMILY MEDICINE | Facility: CLINIC | Age: OVER 89
End: 2025-01-16
Payer: MEDICARE

## 2025-01-16 NOTE — TELEPHONE ENCOUNTER
You've referred him to wound care and vascular already. Home health is trying to do some wound care right now. I will get in touch with Hugh to see about wound care and vascular.

## 2025-01-16 NOTE — TELEPHONE ENCOUNTER
Called HH with recommendations   March 1, 2018      Shari Chauhan MD  [address]  [facility] [address]           [facility]  [address]  [facility] [address]  Phone: [phone]          Patient: Jeniffer Taylor   MR Number: [id number]   YOB: 1928   Date of Visit: 2/27/2018       Dear Dr. Shari Chauhan:    Thank you for referring Jeniffer Taylor to me for evaluation. Attached you will find relevant portions of my assessment and plan of care.    If you have questions, please do not hesitate to call me. I look forward to following Jeniffer Taylor along with you.    Sincerely,    SENG Lopez  CC:  No Recipients    If you would like to receive this communication electronically, please contact [email or web address] or [phone] to request more information on [employer] access.    For providers and/or their staff who would like to refer a patient to [facility], please contact us through our one-stop-shop provider referral line, [facility], at [phone].    If you feel you have received this communication in error or would no longer like to receive these types of communications, please e-mail [email or web address]

## 2025-01-16 NOTE — TELEPHONE ENCOUNTER
Patient needs to establish care with wound care outpatient for clarification of orders and or vascular.  Let me know if patient needs referral to outpatient wound care.

## 2025-01-16 NOTE — TELEPHONE ENCOUNTER
HH nurse needs clarification: patient is only supposed to be using mupirocin on wound of the LLE? Pt has santyl at home and they are confused if they should be using that as well. I do not see santyl on med list or anywhere mentioned in your note.

## 2025-01-28 ENCOUNTER — HOSPITAL ENCOUNTER (OUTPATIENT)
Dept: RADIOLOGY | Facility: HOSPITAL | Age: OVER 89
Discharge: HOME | End: 2025-01-28
Attending: FAMILY MEDICINE
Payer: MEDICARE

## 2025-01-28 ENCOUNTER — HOSPITAL ENCOUNTER (OUTPATIENT)
Dept: RADIOLOGY | Facility: HOSPITAL | Age: OVER 89
Discharge: HOME | End: 2025-01-28
Attending: STUDENT IN AN ORGANIZED HEALTH CARE EDUCATION/TRAINING PROGRAM
Payer: MEDICARE

## 2025-01-28 DIAGNOSIS — M79.89 SOFT TISSUE MASS: ICD-10-CM

## 2025-01-28 DIAGNOSIS — N13.8 BPH WITH URINARY OBSTRUCTION: ICD-10-CM

## 2025-01-28 DIAGNOSIS — N40.1 BPH WITH URINARY OBSTRUCTION: ICD-10-CM

## 2025-01-28 PROCEDURE — 76604 US EXAM CHEST: CPT | Mod: 26,,, | Performed by: RADIOLOGY

## 2025-01-28 PROCEDURE — 76857 US EXAM PELVIC LIMITED: CPT | Mod: 26,,, | Performed by: RADIOLOGY

## 2025-01-28 PROCEDURE — 76604 US EXAM CHEST: CPT | Mod: TC,PO

## 2025-01-28 PROCEDURE — 76857 US EXAM PELVIC LIMITED: CPT | Mod: TC,PO

## 2025-01-30 ENCOUNTER — HOSPITAL ENCOUNTER (OUTPATIENT)
Dept: RADIOLOGY | Facility: HOSPITAL | Age: OVER 89
Discharge: HOME | End: 2025-01-30
Attending: FAMILY MEDICINE
Payer: MEDICARE

## 2025-01-30 ENCOUNTER — PATIENT MESSAGE (OUTPATIENT)
Dept: FAMILY MEDICINE | Facility: CLINIC | Age: OVER 89
End: 2025-01-30
Payer: MEDICARE

## 2025-01-30 ENCOUNTER — E-VISIT (OUTPATIENT)
Dept: FAMILY MEDICINE | Facility: CLINIC | Age: OVER 89
End: 2025-01-30
Payer: MEDICARE

## 2025-01-30 ENCOUNTER — TELEPHONE (OUTPATIENT)
Dept: FAMILY MEDICINE | Facility: CLINIC | Age: OVER 89
End: 2025-01-30

## 2025-01-30 DIAGNOSIS — J44.9 COPD MIXED TYPE: Chronic | ICD-10-CM

## 2025-01-30 DIAGNOSIS — R09.89 CHEST CONGESTION: ICD-10-CM

## 2025-01-30 DIAGNOSIS — Z87.891 FORMER SMOKER: ICD-10-CM

## 2025-01-30 DIAGNOSIS — R91.8 LUNG MASS: ICD-10-CM

## 2025-01-30 DIAGNOSIS — N18.4 STAGE 4 CHRONIC KIDNEY DISEASE: ICD-10-CM

## 2025-01-30 DIAGNOSIS — R05.9 COUGH, UNSPECIFIED TYPE: ICD-10-CM

## 2025-01-30 DIAGNOSIS — R09.89 CHEST CONGESTION: Primary | ICD-10-CM

## 2025-01-30 DIAGNOSIS — R49.9 CHANGE IN VOICE: ICD-10-CM

## 2025-01-30 PROCEDURE — 71046 X-RAY EXAM CHEST 2 VIEWS: CPT | Mod: TC,PO

## 2025-01-30 PROCEDURE — 71046 X-RAY EXAM CHEST 2 VIEWS: CPT | Mod: 26,,, | Performed by: RADIOLOGY

## 2025-01-30 RX ORDER — PROMETHAZINE HYDROCHLORIDE AND DEXTROMETHORPHAN HYDROBROMIDE 6.25; 15 MG/5ML; MG/5ML
2.5 SYRUP ORAL EVERY 4 HOURS PRN
Qty: 120 ML | Refills: 0 | Status: SHIPPED | OUTPATIENT
Start: 2025-01-30 | End: 2025-02-09

## 2025-01-30 NOTE — Clinical Note
I have signed for the following orders AND/OR meds.  Please call the patient and ask the patient to schedule the testing AND/OR inform about any medications that were sent.    Orders Placed This Encounter     X-Ray Chest PA And Lateral         Standing Status: Future         Standing Expiration Date: 1/30/2026         Order Specific Question: May the Radiologist modify the order per protocol to meet the clinical needs of the patient?         Answer: Yes         Order Specific Question: Release to patient         Answer: Immediate   Orders Placed This Encounter     promethazine-dextromethorphan (PROMETHAZINE-DM) 6.25-15 mg/5 mL Syrp         Sig: Take 2.5 mLs by mouth every 4 (four) hours as needed (cough).         Dispense:  120 mL         Refill:  0

## 2025-01-30 NOTE — TELEPHONE ENCOUNTER
----- Message from Bao Mcarthur MD sent at 1/30/2025 11:08 AM CST -----  I have signed for the following orders AND/OR meds.  Please call the patient and ask the patient to schedule the testing AND/OR inform about any medications that were sent.      Orders Placed This Encounter      X-Ray Chest PA And Lateral          Standing Status: Future          Standing Expiration Date: 1/30/2026          Order Specific Question: May the Radiologist modify the order per protocol to meet the clinical needs of the patient?          Answer: Yes          Order Specific Question: Release to patient          Answer: Immediate      Orders Placed This Encounter      promethazine-dextromethorphan (PROMETHAZINE-DM) 6.25-15 mg/5 mL Syrp          Sig: Take 2.5 mLs by mouth every 4 (four) hours as needed (cough).          Dispense:  120 mL          Refill:  0       Performed

## 2025-01-30 NOTE — PROGRESS NOTES
Patient ID: Zach Lund is a 89 y.o. male.    Chief Complaint: URI (Entered automatically based on patient selection in [employer].)    The patient initiated a request through [employer] on 1/30/2025 for evaluation and management with a chief complaint of URI (Entered automatically based on patient selection in [employer].)     I evaluated the questionnaire submission on 01/30/2025  .    [facility] Peq E-Visit Covid    1/30/2025  9:16 AM CST - Filed by Patient   Do you agree to participate in an E-Visit? Yes   If you have any of the following symptoms, go to your local emergency room or call 911: I acknowledge   Choose the state of your primary residence [place]   What is the main issue you would like addressed today? phleghm and chest congestion - test for pneumonia.  difficult to get phlegm out of chest.   Do you think you might have COVID-19 or the Flu? No   Have you tested positive for COVID-19 or Flu? No   What symptoms do you currently have?  Cough   Describe your cough: Contains mucus   Describe your mucus: Green;  Yellow;  Thick   Have you ever smoked? Smoked in the past   Have you had a fever? No   When did your symptoms first appear? 1/20/2025   In the last two weeks, have you been in close contact with someone who has COVID-19, the Flu, or strep throat? No   List what you have done or taken to help your symptoms. o2, rest, mucinex, spiriva   On a scale of 1-10, where 10 is the worst you can imagine, how severe are your symptoms? (range: 1 - 10) 9   Have your symptoms changed since they first started? No change   Do you have transportation to an [facility] location to get tested for COVID-19? Yes   Provide any additional information you feel is important. xray will show mass on lung(s).  this has been present for years, so there is a need to compare previous images before diagnosing.  seems to happen every January recently.  stage 4/5 CKD.  diagnosed COPD as well.; this morning, was able to get loose phlegm up and  "thought it was "the fix" but chest is tightening up again without phlegm release.  causes breathing difficulty.  ; constant throat clearing.  nasaly deep voice.   Please attach any relevant images or files    Are you able to take your vital signs? No         Encounter Diagnoses   Name Primary?    Chest congestion Yes    Former smoker     Lung mass     COPD mixed type     Stage 4 chronic kidney disease     Change in voice     Cough, unspecified type         Orders Placed This Encounter   Procedures    X-Ray Chest PA And Lateral     Standing Status:   Future     Standing Expiration Date:   1/30/2026     Order Specific Question:   May the Radiologist modify the order per protocol to meet the clinical needs of the patient?     Answer:   Yes     Order Specific Question:   Release to patient     Answer:   Immediate      Medications Ordered This Encounter   Medications    promethazine-dextromethorphan (PROMETHAZINE-DM) 6.25-15 mg/5 mL Syrp     Sig: Take 2.5 mLs by mouth every 4 (four) hours as needed (cough).     Dispense:  120 mL     Refill:  0        Follow up in about 1 week (around 2/6/2025), or if symptoms worsen or fail to improve, for Follow-up on condition.      E-Visit Time Tracking:    Day 1 Time (in minutes): 12    Total Time (in minutes): 12               "

## 2025-02-01 NOTE — PROGRESS NOTES
1st check to see if patient has seen the results.  If not then  CALL patient with results and Document verification.  Schedule follow-up if needed.  [phone]    Ultrasound of the soft tissue mass of the upper back has been reviewed by radiology.  They state possible lipoma however would like further evaluation with CT scan.  Please follow-up with me if you would like to further evaluate this and discuss in detail.  I can also refer to general surgery if wanting to have removed.

## 2025-02-01 NOTE — PROGRESS NOTES
Franc Lund, Your chest x-ray was stable.  No significant lesions were found and no significant fluid was noted.  The heart appears stable.  Please follow-up with cardiology and myself if no improvement in symptoms.      Please call patient with these normal results if they are not enrolled with my chart.

## 2025-02-03 NOTE — LETTER
February 18, 2025    Family of Mr.Eugene Lund  [address] [facility] [address]             [facility]  [address] [facility] [address]  Phone: [phone]  Fax: [phone] To the family of Mr. Lund:    Please accept our deepest condolences in regards to the recent death of your family member, Mr. Lund. He was a most remarkable person, and it was always a pleasure to see him. I hope that we were able to provide him with some relief during the time that he was under our care.     On behalf of myself and my staff, please also extend our condolences to all of your family. If there is anything else that we can do for you, please do not hesitate to contact us.    Sincerely,        Bao Mcarthur MD, Geraldine Rawls LPN and Terry Weinstein MA

## 2025-02-05 NOTE — PROGRESS NOTES
Please call to make sure patient get the results. Your urinalysis is abnormal.  Showing protein and glucose.  This is most likely due to diabetes and other chronic medical conditions including taking Jardiance.  There is no infection present on this urinalysis.    [phone]

## 2025-02-05 NOTE — TELEPHONE ENCOUNTER
I have signed for the following orders AND/OR meds.  Please call the patient and ask the patient to schedule the testing AND/OR inform about any medications that were sent.      Orders Placed This Encounter   Procedures    Urinalysis, Reflex to Urine Culture Urine, Clean Catch     Standing Status:   Future     Standing Expiration Date:   4/6/2026     Order Specific Question:   Preferred Collection Type     Answer:   Urine, Clean Catch     Order Specific Question:   Specimen Source     Answer:   Urine

## 2025-02-07 NOTE — TELEPHONE ENCOUNTER
I have signed for the following orders AND/OR meds.  Please call the patient and ask the patient to schedule the testing AND/OR inform about any medications that were sent.      Orders Placed This Encounter   Procedures    Urinalysis, Reflex to Urine Culture Urine, Clean Catch     Standing Status:   Future     Number of Occurrences:   1     Standing Expiration Date:   4/6/2026     Order Specific Question:   Preferred Collection Type     Answer:   Urine, Clean Catch     Order Specific Question:   Specimen Source     Answer:   Urine       Medications Ordered This Encounter   Medications    ALPRAZolam (XANAX) 0.5 MG tablet     Sig: Take 1 tablet (0.5 mg total) by mouth nightly as needed for Anxiety.     Dispense:  30 tablet     Refill:  0    mupirocin (BACTROBAN) 2 % ointment     Sig: Apply topically 3 (three) times daily.     Dispense:  110 g     Refill:  0

## 2025-02-07 NOTE — TELEPHONE ENCOUNTER
I received your message which was reviewed along with the the medication list and allergies that we have below.  Please review it for accuracy to make sure that we have the most recent records on your history.     Based on this, the following orders were placed AND/OR medicines were sent in.     Orders Placed This Encounter   Procedures    Urinalysis, Reflex to Urine Culture Urine, Clean Catch     Standing Status:   Future     Number of Occurrences:   1     Standing Expiration Date:   4/6/2026     Order Specific Question:   Preferred Collection Type     Answer:   Urine, Clean Catch     Order Specific Question:   Specimen Source     Answer:   Urine       Medications written and sent at this time include:  Medications Ordered This Encounter   Medications    ALPRAZolam (XANAX) 0.5 MG tablet     Sig: Take 1 tablet (0.5 mg total) by mouth nightly as needed for Anxiety.     Dispense:  30 tablet     Refill:  0       Your pharmacy(ies) of choice at this time on record include the list below and any medications would have been sent to the one at the top.    [facility] - [facility] [address]  [address] [facility] [address]  Phone: [phone] Fax: [phone]    [facility] [place], [place] - [address]  [address] [facility]  [facility] [address]  Phone: [phone] Fax: [phone]      Thank you for choosing us as your healthcare provider!  Dr. Bao Mcarthur    ALLERGY LIST  Review of patient's allergies indicates:   Allergen Reactions    Iodinated contrast media Anaphylaxis    Iodine Anaphylaxis     Breathing issue    [place] Anaphylaxis       MEDICATION LIST  Current Outpatient Medications on File Prior to Visit   Medication Sig Dispense Refill    albuterol (ACCUNEB) 1.25 mg/3 mL Nebu Take 3 mLs (1.25 mg total) by nebulization every 6 (six) hours as needed (cough, shortness of breath). Rescue 75 mL 11    apixaban (ELIQUIS) 2.5 mg Tab 2.5 mg 2 (two) times daily.      atorvastatin  (LIPITOR) 20 MG tablet 20 mg.      bumetanide (BUMEX) 1 MG tablet Take 1 mg by mouth daily as needed.      buprenorphine 10 mcg/hr (BUTRANS) weekly patch Place 1 patch onto the skin every 7 days.      calcitRIOL (ROCALTROL) 0.25 MCG Cap Take 1 capsule (0.25 mcg total) by mouth once a week. Active vitamin D 12 capsule 3    carvediloL (COREG) 3.125 MG tablet Take 6.25 mg by mouth once daily.      citalopram (CELEXA) 20 MG tablet 20 mg.      clindamycin (CLEOCIN) 300 MG capsule Take 1 capsule (300 mg total) by mouth 3 (three) times daily. (Patient not taking: Reported on 1/9/2025) 30 capsule 0    dutasteride (AVODART) 0.5 mg capsule Take 1 capsule by mouth once daily.      empagliflozin (JARDIANCE) 25 mg tablet Take 1 tablet (25 mg total) by mouth once daily. 90 tablet 3    fluocinonide 0.05% (LIDEX) 0.05 % cream APPLY SMALL AMOUNT TOPICALLY TWICE A DAY AS NEEDED ITCHING      glucagon (GVOKE HYPOPEN 2-PACK) 1 mg/0.2 mL AtIn Inject 1 mg into the skin as needed (SEVERE HYPOGLYCEMIA). 2 each 5    guaifenesin/phenylephrine HCl (MUCINEX COLD ORAL) Take 400 mg by mouth.      insulin glargine U-100, Lantus, 100 unit/mL (3 mL) SubQ InPn pen Inject 5 Units into the skin once daily.      levothyroxine (SYNTHROID) 200 MCG tablet Take 1 tablet by mouth once daily.      mupirocin (BACTROBAN) 2 % ointment Apply topically 3 (three) times daily. (Patient not taking: Reported on 1/9/2025) 30 g 0    neomycin-polymyxin-dexamethasone (MAXITROL) 3.5mg/mL-10,000 unit/mL-0.1 % DrpS Place 1 drop into the left eye every 6 (six) hours. 5 mL 0    pregabalin (LYRICA) 25 MG capsule Take 1 capsule (25 mg total) by mouth 2 (two) times daily. 60 capsule 0    promethazine-dextromethorphan (PROMETHAZINE-DM) 6.25-15 mg/5 mL Syrp Take 2.5 mLs by mouth every 4 (four) hours as needed (cough). 120 mL 0    rOPINIRole (REQUIP) 0.25 MG tablet 0.25 mg.      semaglutide (OZEMPIC) 1 mg/dose (4 mg/3 mL) Inject 1 mg into the skin every 7 days. 9 mL 3    tamsulosin  (FLOMAX) 0.4 mg Cap Take 1 capsule (0.4 mg total) by mouth every evening. 30 capsule 11    tiotropium (SPIRIVA) 18 mcg inhalation capsule Inhale 1 capsule (18 mcg total) into the lungs once daily. Controller 90 capsule 3    traMADoL (ULTRAM) 50 mg tablet Take 1 tablet (50 mg total) by mouth every 8 (eight) hours as needed for Pain. 12 tablet 0    triamcinolone acetonide 0.1% (KENALOG) 0.1 % cream APPLY SMALL AMOUNT TOPICALLY TWICE A DAY AS NEEDED FOR ITCHING      vitamin D (VITAMIN D3) 1000 units Tab 25 mcg.       No current facility-administered medications on file prior to visit.       HEALTH MAINTENANCE THAT IS OVERDUE OR NEEDS TO BE UPDATED ON OUR CHART IS LISTED BELOW.  IF YOU HAVE HAD IT DONE ELSEWHERE, PLEASE SEND US DATES AND RECORDS IF YOU HAVE THEM TO MAKE YOUR CHART ACCURATE.  IF YOU HAVE NOT HAD THESE DONE AND ARE READY FOR US TO SCHEDULE THEM, PLEASE SEND US A MESSAGE.  Health Maintenance Due   Topic Date Due    RSV Vaccine (Age 60+ and Pregnant patients) (1 - 1-dose 75+ series) Never done    Lipid Panel  01/15/2025       DISCLAIMER: This note was compiled by using a speech recognition dictation system and therefore please be aware that typographical / speech recognition errors can and do occur.  Please contact me if you see any errors specifically.    Bao Mcarthur MD  We Offer Telehealth & Same Day Appointments!   Book your Telehealth appointment with me through my nurse or   Clinic appointments on [employer]!  [phone]     Check out my Facebook Page and Follow Me at: CLICK HERE    Check out my website at [employer] by clicking on: CLICK HERE    To Schedule appointments online, go to [employer]: CLICK HERE     Location: [email or web address]    [address] [place] [address]    FAX: [phone]

## 2025-02-17 ENCOUNTER — EXTERNAL HOME HEALTH (OUTPATIENT)
Dept: HOME HEALTH SERVICES | Facility: HOSPITAL | Age: OVER 89
End: 2025-02-17
Payer: MEDICARE

## 2025-02-24 ENCOUNTER — PATIENT OUTREACH (OUTPATIENT)
Dept: ADMINISTRATIVE | Facility: HOSPITAL | Age: OVER 89
End: 2025-02-24
Payer: MEDICARE

## 2025-02-24 ENCOUNTER — TELEPHONE (OUTPATIENT)
Dept: FAMILY MEDICINE | Facility: CLINIC | Age: OVER 89
End: 2025-02-24
Payer: MEDICARE

## 2025-02-24 NOTE — LETTER
February 24, 2025    Family of Mr.Eugene Lund  [address] [facility] [address]             [facility]  [address] [facility] [address]  Phone: [phone]  Fax: [phone] To the family of Mr. Lund:    Please accept our deepest condolences in regards to the recent death of your family member, Mr. Lund. He was a most remarkable person, and it was always a pleasure to see him. I hope that we were able to provide him with some relief during the time that he was under our care.     On behalf of myself and my staff, please also extend our condolences to all of your family. If there is anything else that we can do for you, please do not hesitate to contact us.    Sincerely,        Bao Mcarthur MD

## 2025-04-02 ENCOUNTER — DOCUMENT SCAN (OUTPATIENT)
Dept: HOME HEALTH SERVICES | Facility: HOSPITAL | Age: OVER 89
End: 2025-04-02
Payer: MEDICARE